# Patient Record
Sex: MALE | Race: WHITE | NOT HISPANIC OR LATINO | Employment: OTHER | URBAN - METROPOLITAN AREA
[De-identification: names, ages, dates, MRNs, and addresses within clinical notes are randomized per-mention and may not be internally consistent; named-entity substitution may affect disease eponyms.]

---

## 2017-02-01 ENCOUNTER — OFFICE VISIT - HEALTHEAST (OUTPATIENT)
Dept: FAMILY MEDICINE | Facility: CLINIC | Age: 67
End: 2017-02-01

## 2017-02-01 DIAGNOSIS — J43.9 COPD (CHRONIC OBSTRUCTIVE PULMONARY DISEASE) WITH EMPHYSEMA (H): ICD-10-CM

## 2017-02-01 DIAGNOSIS — I71.40 AAA (ABDOMINAL AORTIC ANEURYSM) (H): ICD-10-CM

## 2017-02-01 DIAGNOSIS — R09.02 HYPOXEMIA: ICD-10-CM

## 2017-02-01 DIAGNOSIS — N40.0 BPH (BENIGN PROSTATIC HYPERPLASIA): ICD-10-CM

## 2017-02-01 DIAGNOSIS — R68.82 LOW LIBIDO: ICD-10-CM

## 2017-02-01 DIAGNOSIS — E11.9 TYPE 2 DIABETES MELLITUS (H): ICD-10-CM

## 2017-02-01 LAB — HBA1C MFR BLD: 7.2 % (ref 3.5–6)

## 2017-02-06 ENCOUNTER — AMBULATORY - HEALTHEAST (OUTPATIENT)
Dept: FAMILY MEDICINE | Facility: CLINIC | Age: 67
End: 2017-02-06

## 2017-02-08 ENCOUNTER — COMMUNICATION - HEALTHEAST (OUTPATIENT)
Dept: FAMILY MEDICINE | Facility: CLINIC | Age: 67
End: 2017-02-08

## 2017-02-09 ENCOUNTER — COMMUNICATION - HEALTHEAST (OUTPATIENT)
Dept: FAMILY MEDICINE | Facility: CLINIC | Age: 67
End: 2017-02-09

## 2017-02-09 DIAGNOSIS — R79.89 LOW TESTOSTERONE LEVEL IN MALE: ICD-10-CM

## 2017-02-27 ENCOUNTER — AMBULATORY - HEALTHEAST (OUTPATIENT)
Dept: SCHEDULING | Facility: CLINIC | Age: 67
End: 2017-02-27

## 2017-03-02 ENCOUNTER — OFFICE VISIT - HEALTHEAST (OUTPATIENT)
Dept: PULMONOLOGY | Facility: OTHER | Age: 67
End: 2017-03-02

## 2017-03-02 DIAGNOSIS — J41.0 SIMPLE CHRONIC BRONCHITIS (H): ICD-10-CM

## 2017-03-02 DIAGNOSIS — G47.33 OBSTRUCTIVE SLEEP APNEA (ADULT) (PEDIATRIC): ICD-10-CM

## 2017-03-02 DIAGNOSIS — R06.09 DYSPNEA ON EXERTION: ICD-10-CM

## 2017-03-05 ENCOUNTER — COMMUNICATION - HEALTHEAST (OUTPATIENT)
Dept: EDUCATION SERVICES | Facility: CLINIC | Age: 67
End: 2017-03-05

## 2017-03-09 ENCOUNTER — RECORDS - HEALTHEAST (OUTPATIENT)
Dept: VASCULAR ULTRASOUND | Facility: CLINIC | Age: 67
End: 2017-03-09

## 2017-03-09 ENCOUNTER — OFFICE VISIT - HEALTHEAST (OUTPATIENT)
Dept: VASCULAR SURGERY | Facility: CLINIC | Age: 67
End: 2017-03-09

## 2017-03-09 ENCOUNTER — RECORDS - HEALTHEAST (OUTPATIENT)
Dept: ADMINISTRATIVE | Facility: OTHER | Age: 67
End: 2017-03-09

## 2017-03-09 DIAGNOSIS — I10 HTN (HYPERTENSION): ICD-10-CM

## 2017-03-09 DIAGNOSIS — I71.40 ABDOMINAL AORTIC ANEURYSM, WITHOUT RUPTURE: ICD-10-CM

## 2017-03-09 DIAGNOSIS — Z87.891 HISTORY OF SMOKING: ICD-10-CM

## 2017-03-09 DIAGNOSIS — I71.40 ABDOMINAL AORTIC ANEURYSM WITHOUT RUPTURE (H): ICD-10-CM

## 2017-03-09 DIAGNOSIS — E78.5 HYPERLIPIDEMIA: ICD-10-CM

## 2017-03-09 ASSESSMENT — MIFFLIN-ST. JEOR: SCORE: 1900.68

## 2017-03-13 ENCOUNTER — COMMUNICATION - HEALTHEAST (OUTPATIENT)
Dept: FAMILY MEDICINE | Facility: CLINIC | Age: 67
End: 2017-03-13

## 2017-03-20 ENCOUNTER — AMBULATORY - HEALTHEAST (OUTPATIENT)
Dept: SLEEP MEDICINE | Facility: CLINIC | Age: 67
End: 2017-03-20

## 2017-03-20 ENCOUNTER — OFFICE VISIT - HEALTHEAST (OUTPATIENT)
Dept: SLEEP MEDICINE | Facility: CLINIC | Age: 67
End: 2017-03-20

## 2017-03-20 DIAGNOSIS — G47.10 HYPERSOMNIA, UNSPECIFIED: ICD-10-CM

## 2017-03-20 DIAGNOSIS — G47.33 OSA ON CPAP: ICD-10-CM

## 2017-03-20 DIAGNOSIS — G47.8 SLEEP DYSFUNCTION WITH SLEEP STAGE DISTURBANCE: ICD-10-CM

## 2017-03-20 DIAGNOSIS — J44.9 COPD (CHRONIC OBSTRUCTIVE PULMONARY DISEASE) (H): ICD-10-CM

## 2017-03-20 ASSESSMENT — MIFFLIN-ST. JEOR: SCORE: 1916.55

## 2017-03-31 ENCOUNTER — RECORDS - HEALTHEAST (OUTPATIENT)
Dept: ADMINISTRATIVE | Facility: OTHER | Age: 67
End: 2017-03-31

## 2017-04-03 ENCOUNTER — RECORDS - HEALTHEAST (OUTPATIENT)
Dept: ADMINISTRATIVE | Facility: OTHER | Age: 67
End: 2017-04-03

## 2017-04-21 ENCOUNTER — COMMUNICATION - HEALTHEAST (OUTPATIENT)
Dept: FAMILY MEDICINE | Facility: CLINIC | Age: 67
End: 2017-04-21

## 2017-04-23 ENCOUNTER — COMMUNICATION - HEALTHEAST (OUTPATIENT)
Dept: EDUCATION SERVICES | Facility: CLINIC | Age: 67
End: 2017-04-23

## 2017-04-27 ENCOUNTER — COMMUNICATION - HEALTHEAST (OUTPATIENT)
Dept: FAMILY MEDICINE | Facility: CLINIC | Age: 67
End: 2017-04-27

## 2017-04-27 DIAGNOSIS — Z71.6 ENCOUNTER FOR SMOKING CESSATION COUNSELING: ICD-10-CM

## 2017-05-01 ENCOUNTER — OFFICE VISIT - HEALTHEAST (OUTPATIENT)
Dept: FAMILY MEDICINE | Facility: CLINIC | Age: 67
End: 2017-05-01

## 2017-05-01 ENCOUNTER — COMMUNICATION - HEALTHEAST (OUTPATIENT)
Dept: FAMILY MEDICINE | Facility: CLINIC | Age: 67
End: 2017-05-01

## 2017-05-01 DIAGNOSIS — G62.9 PERIPHERAL NEUROPATHY: ICD-10-CM

## 2017-05-01 DIAGNOSIS — G47.33 OBSTRUCTIVE SLEEP APNEA (ADULT) (PEDIATRIC): ICD-10-CM

## 2017-05-01 DIAGNOSIS — J43.9 COPD (CHRONIC OBSTRUCTIVE PULMONARY DISEASE) WITH EMPHYSEMA (H): ICD-10-CM

## 2017-05-01 DIAGNOSIS — I10 ESSENTIAL HYPERTENSION: ICD-10-CM

## 2017-06-01 ENCOUNTER — OFFICE VISIT - HEALTHEAST (OUTPATIENT)
Dept: SLEEP MEDICINE | Facility: CLINIC | Age: 67
End: 2017-06-01

## 2017-06-01 DIAGNOSIS — G47.10 HYPERSOMNIA, UNSPECIFIED: ICD-10-CM

## 2017-06-01 DIAGNOSIS — G47.8 SLEEP DYSFUNCTION WITH SLEEP STAGE DISTURBANCE: ICD-10-CM

## 2017-06-01 DIAGNOSIS — G47.33 OSA ON CPAP: ICD-10-CM

## 2017-06-01 ASSESSMENT — MIFFLIN-ST. JEOR: SCORE: 1912.92

## 2017-06-12 ENCOUNTER — COMMUNICATION - HEALTHEAST (OUTPATIENT)
Dept: FAMILY MEDICINE | Facility: CLINIC | Age: 67
End: 2017-06-12

## 2017-06-12 DIAGNOSIS — I10 ESSENTIAL HYPERTENSION: ICD-10-CM

## 2017-06-12 DIAGNOSIS — J44.9 COPD (CHRONIC OBSTRUCTIVE PULMONARY DISEASE) (H): ICD-10-CM

## 2017-06-12 DIAGNOSIS — R79.89 LOW TESTOSTERONE LEVEL IN MALE: ICD-10-CM

## 2017-06-12 DIAGNOSIS — J43.9 COPD (CHRONIC OBSTRUCTIVE PULMONARY DISEASE) WITH EMPHYSEMA (H): ICD-10-CM

## 2017-06-12 DIAGNOSIS — Z00.00 HEALTHCARE MAINTENANCE: ICD-10-CM

## 2017-06-12 DIAGNOSIS — Z71.6 ENCOUNTER FOR SMOKING CESSATION COUNSELING: ICD-10-CM

## 2017-06-12 DIAGNOSIS — E11.9 TYPE 2 DIABETES MELLITUS (H): ICD-10-CM

## 2017-06-12 DIAGNOSIS — I10 HTN (HYPERTENSION): ICD-10-CM

## 2017-06-12 DIAGNOSIS — E78.5 HYPERLIPEMIA: ICD-10-CM

## 2017-06-14 ENCOUNTER — RECORDS - HEALTHEAST (OUTPATIENT)
Dept: ADMINISTRATIVE | Facility: OTHER | Age: 67
End: 2017-06-14

## 2017-06-20 ENCOUNTER — COMMUNICATION - HEALTHEAST (OUTPATIENT)
Dept: FAMILY MEDICINE | Facility: CLINIC | Age: 67
End: 2017-06-20

## 2017-06-20 DIAGNOSIS — R79.89 LOW TESTOSTERONE LEVEL IN MALE: ICD-10-CM

## 2017-06-21 ENCOUNTER — AMBULATORY - HEALTHEAST (OUTPATIENT)
Dept: FAMILY MEDICINE | Facility: CLINIC | Age: 67
End: 2017-06-21

## 2017-06-21 ENCOUNTER — COMMUNICATION - HEALTHEAST (OUTPATIENT)
Dept: FAMILY MEDICINE | Facility: CLINIC | Age: 67
End: 2017-06-21

## 2017-06-21 DIAGNOSIS — R79.89 LOW TESTOSTERONE LEVEL IN MALE: ICD-10-CM

## 2017-06-21 DIAGNOSIS — R79.89 LOW TESTOSTERONE: ICD-10-CM

## 2017-06-23 ENCOUNTER — RECORDS - HEALTHEAST (OUTPATIENT)
Dept: ADMINISTRATIVE | Facility: OTHER | Age: 67
End: 2017-06-23

## 2017-06-26 ENCOUNTER — COMMUNICATION - HEALTHEAST (OUTPATIENT)
Dept: FAMILY MEDICINE | Facility: CLINIC | Age: 67
End: 2017-06-26

## 2017-06-26 DIAGNOSIS — E55.9 VITAMIN D DEFICIENCY: ICD-10-CM

## 2017-06-29 ENCOUNTER — OFFICE VISIT - HEALTHEAST (OUTPATIENT)
Dept: SLEEP MEDICINE | Facility: CLINIC | Age: 67
End: 2017-06-29

## 2017-06-29 DIAGNOSIS — G47.10 HYPERSOMNIA, UNSPECIFIED: ICD-10-CM

## 2017-06-29 DIAGNOSIS — G47.34 SLEEP RELATED HYPOXIA: ICD-10-CM

## 2017-06-29 DIAGNOSIS — G47.8 SLEEP DYSFUNCTION WITH SLEEP STAGE DISTURBANCE: ICD-10-CM

## 2017-06-29 DIAGNOSIS — G47.33 OSA ON CPAP: ICD-10-CM

## 2017-06-29 ASSESSMENT — MIFFLIN-ST. JEOR: SCORE: 1917

## 2017-06-30 ENCOUNTER — COMMUNICATION - HEALTHEAST (OUTPATIENT)
Dept: FAMILY MEDICINE | Facility: CLINIC | Age: 67
End: 2017-06-30

## 2017-07-03 ENCOUNTER — COMMUNICATION - HEALTHEAST (OUTPATIENT)
Dept: FAMILY MEDICINE | Facility: CLINIC | Age: 67
End: 2017-07-03

## 2017-07-03 DIAGNOSIS — R79.89 LOW TESTOSTERONE: ICD-10-CM

## 2017-07-10 ENCOUNTER — HOSPITAL ENCOUNTER (OUTPATIENT)
Dept: CT IMAGING | Facility: CLINIC | Age: 67
Discharge: HOME | End: 2017-07-10
Attending: SURGERY

## 2017-07-10 DIAGNOSIS — I71.40 ABDOMINAL AORTIC ANEURYSM WITHOUT RUPTURE (H): ICD-10-CM

## 2017-07-24 ENCOUNTER — COMMUNICATION - HEALTHEAST (OUTPATIENT)
Dept: TELEHEALTH | Facility: CLINIC | Age: 67
End: 2017-07-24

## 2017-07-24 ENCOUNTER — OFFICE VISIT - HEALTHEAST (OUTPATIENT)
Dept: VASCULAR SURGERY | Facility: CLINIC | Age: 67
End: 2017-07-24

## 2017-07-24 DIAGNOSIS — I71.40 ABDOMINAL AORTIC ANEURYSM WITHOUT RUPTURE (H): ICD-10-CM

## 2017-07-24 ASSESSMENT — MIFFLIN-ST. JEOR: SCORE: 1912.92

## 2017-09-28 ENCOUNTER — COMMUNICATION - HEALTHEAST (OUTPATIENT)
Dept: FAMILY MEDICINE | Facility: CLINIC | Age: 67
End: 2017-09-28

## 2017-10-12 ENCOUNTER — OFFICE VISIT - HEALTHEAST (OUTPATIENT)
Dept: FAMILY MEDICINE | Facility: CLINIC | Age: 67
End: 2017-10-12

## 2017-10-12 DIAGNOSIS — E11.9 TYPE 2 DIABETES MELLITUS (H): ICD-10-CM

## 2017-10-12 DIAGNOSIS — I71.40 AAA (ABDOMINAL AORTIC ANEURYSM) (H): ICD-10-CM

## 2017-10-12 DIAGNOSIS — Q27.2: ICD-10-CM

## 2017-10-12 DIAGNOSIS — Z71.6 ENCOUNTER FOR SMOKING CESSATION COUNSELING: ICD-10-CM

## 2017-10-12 DIAGNOSIS — E78.5 HYPERLIPEMIA: ICD-10-CM

## 2017-10-12 DIAGNOSIS — G62.9 PERIPHERAL NEUROPATHY: ICD-10-CM

## 2017-10-12 DIAGNOSIS — Z23 NEED FOR IMMUNIZATION AGAINST INFLUENZA: ICD-10-CM

## 2017-10-12 DIAGNOSIS — G47.30 SLEEP APNEA: ICD-10-CM

## 2017-10-12 DIAGNOSIS — J43.9 COPD (CHRONIC OBSTRUCTIVE PULMONARY DISEASE) WITH EMPHYSEMA (H): ICD-10-CM

## 2017-10-12 DIAGNOSIS — R06.09 DYSPNEA ON EXERTION: ICD-10-CM

## 2017-10-12 DIAGNOSIS — J44.9 COPD (CHRONIC OBSTRUCTIVE PULMONARY DISEASE) (H): ICD-10-CM

## 2017-10-12 DIAGNOSIS — R79.89 LOW TESTOSTERONE: ICD-10-CM

## 2017-10-12 DIAGNOSIS — N40.0 BPH (BENIGN PROSTATIC HYPERPLASIA): ICD-10-CM

## 2017-10-12 DIAGNOSIS — Z00.00 HEALTHCARE MAINTENANCE: ICD-10-CM

## 2017-10-12 DIAGNOSIS — I10 ESSENTIAL HYPERTENSION: ICD-10-CM

## 2017-10-12 LAB
CHOLEST SERPL-MCNC: 158 MG/DL
FASTING STATUS PATIENT QL REPORTED: NO
HBA1C MFR BLD: 7.5 % (ref 3.5–6)
HDLC SERPL-MCNC: 31 MG/DL
LDLC SERPL CALC-MCNC: 89 MG/DL
TRIGL SERPL-MCNC: 189 MG/DL

## 2017-10-13 LAB — HCV AB SERPL QL IA: NEGATIVE

## 2017-10-16 ENCOUNTER — COMMUNICATION - HEALTHEAST (OUTPATIENT)
Dept: OTHER | Facility: CLINIC | Age: 67
End: 2017-10-16

## 2017-10-16 RX ORDER — BUDESONIDE AND FORMOTEROL FUMARATE DIHYDRATE 80; 4.5 UG/1; UG/1
2 AEROSOL RESPIRATORY (INHALATION) 2 TIMES DAILY
Qty: 1 INHALER | Refills: 12 | Status: SHIPPED | OUTPATIENT
Start: 2017-10-16

## 2017-10-16 RX ORDER — TESTOSTERONE 40.5 MG/2.5G
GEL TOPICAL
Qty: 75 G | Refills: 5 | Status: SHIPPED | OUTPATIENT
Start: 2017-10-16 | End: 2021-01-01

## 2017-10-16 RX ORDER — TIOTROPIUM BROMIDE 18 UG/1
CAPSULE ORAL; RESPIRATORY (INHALATION)
Qty: 30 CAPSULE | Refills: 5 | Status: SHIPPED | OUTPATIENT
Start: 2017-10-16

## 2017-10-18 ENCOUNTER — COMMUNICATION - HEALTHEAST (OUTPATIENT)
Dept: FAMILY MEDICINE | Facility: CLINIC | Age: 67
End: 2017-10-18

## 2017-10-18 DIAGNOSIS — E11.9 TYPE 2 DIABETES MELLITUS (H): ICD-10-CM

## 2017-10-25 ENCOUNTER — AMBULATORY - HEALTHEAST (OUTPATIENT)
Dept: OTHER | Facility: CLINIC | Age: 67
End: 2017-10-25

## 2017-10-30 ENCOUNTER — HOSPITAL ENCOUNTER (OUTPATIENT)
Dept: CARDIOLOGY | Facility: CLINIC | Age: 67
Discharge: HOME | End: 2017-10-30
Attending: FAMILY MEDICINE

## 2017-10-30 ENCOUNTER — HOSPITAL ENCOUNTER (OUTPATIENT)
Dept: NUCLEAR MEDICINE | Facility: CLINIC | Age: 67
Discharge: HOME | End: 2017-10-30
Attending: FAMILY MEDICINE

## 2017-10-30 DIAGNOSIS — R06.09 DYSPNEA ON EXERTION: ICD-10-CM

## 2017-11-02 ENCOUNTER — HOSPITAL ENCOUNTER (OUTPATIENT)
Dept: NUCLEAR MEDICINE | Facility: CLINIC | Age: 67
Discharge: HOME | End: 2017-11-02
Attending: FAMILY MEDICINE

## 2017-11-02 ENCOUNTER — HOSPITAL ENCOUNTER (OUTPATIENT)
Dept: CARDIOLOGY | Facility: CLINIC | Age: 67
Discharge: HOME | End: 2017-11-02
Attending: FAMILY MEDICINE

## 2017-11-02 DIAGNOSIS — R06.09 OTHER FORMS OF DYSPNEA: ICD-10-CM

## 2017-11-02 DIAGNOSIS — R06.09 DYSPNEA ON EXERTION: ICD-10-CM

## 2017-11-02 LAB
CV STRESS CURRENT BP HE: NORMAL
CV STRESS CURRENT HR HE: 76
CV STRESS CURRENT HR HE: 77
CV STRESS CURRENT HR HE: 77
CV STRESS CURRENT HR HE: 89
CV STRESS CURRENT HR HE: 90
CV STRESS CURRENT HR HE: 91
CV STRESS CURRENT HR HE: 92
CV STRESS CURRENT HR HE: 92
CV STRESS CURRENT HR HE: 95
CV STRESS CURRENT HR HE: 98
CV STRESS DEVIATION TIME HE: NORMAL
CV STRESS ECHO PERCENT HR HE: NORMAL
CV STRESS EXERCISE STAGE HE: NORMAL
CV STRESS FINAL RESTING BP HE: NORMAL
CV STRESS FINAL RESTING HR HE: 92
CV STRESS MAX HR HE: 102
CV STRESS MAX TREADMILL GRADE HE: 0
CV STRESS MAX TREADMILL SPEED HE: 0
CV STRESS PEAK DIA BP HE: NORMAL
CV STRESS PEAK SYS BP HE: NORMAL
CV STRESS PHASE HE: NORMAL
CV STRESS PROTOCOL HE: NORMAL
CV STRESS RESTING PT POSITION HE: NORMAL
CV STRESS ST DEVIATION AMOUNT HE: NORMAL
CV STRESS ST DEVIATION ELEVATION HE: NORMAL
CV STRESS ST EVELATION AMOUNT HE: NORMAL
CV STRESS TEST TYPE HE: NORMAL
CV STRESS TOTAL STAGE TIME MIN 1 HE: NORMAL
NUC STRESS EJECTION FRACTION: 60 %
STRESS ECHO BASELINE BP: NORMAL
STRESS ECHO BASELINE HR: 77
STRESS ECHO CALCULATED PERCENT HR: 67 %
STRESS ECHO LAST STRESS BP: NORMAL
STRESS ECHO LAST STRESS HR: 90

## 2017-11-03 ENCOUNTER — COMMUNICATION - HEALTHEAST (OUTPATIENT)
Dept: FAMILY MEDICINE | Facility: CLINIC | Age: 67
End: 2017-11-03

## 2017-12-08 ENCOUNTER — AMBULATORY - HEALTHEAST (OUTPATIENT)
Dept: OTHER | Facility: CLINIC | Age: 67
End: 2017-12-08

## 2017-12-15 ENCOUNTER — COMMUNICATION - HEALTHEAST (OUTPATIENT)
Dept: FAMILY MEDICINE | Facility: CLINIC | Age: 67
End: 2017-12-15

## 2017-12-15 DIAGNOSIS — E11.9 TYPE 2 DIABETES MELLITUS (H): ICD-10-CM

## 2018-01-02 ENCOUNTER — RECORDS - HEALTHEAST (OUTPATIENT)
Dept: ADMINISTRATIVE | Facility: OTHER | Age: 68
End: 2018-01-02

## 2018-01-09 ENCOUNTER — AMBULATORY - HEALTHEAST (OUTPATIENT)
Dept: EDUCATION SERVICES | Facility: CLINIC | Age: 68
End: 2018-01-09

## 2018-02-05 ENCOUNTER — RECORDS - HEALTHEAST (OUTPATIENT)
Dept: VASCULAR ULTRASOUND | Facility: CLINIC | Age: 68
End: 2018-02-05

## 2018-02-05 ENCOUNTER — OFFICE VISIT - HEALTHEAST (OUTPATIENT)
Dept: VASCULAR SURGERY | Facility: CLINIC | Age: 68
End: 2018-02-05

## 2018-02-05 DIAGNOSIS — I71.40 ABDOMINAL AORTIC ANEURYSM (H): ICD-10-CM

## 2018-02-05 DIAGNOSIS — I71.40 ABDOMINAL AORTIC ANEURYSM, WITHOUT RUPTURE: ICD-10-CM

## 2018-03-21 ENCOUNTER — COMMUNICATION - HEALTHEAST (OUTPATIENT)
Dept: FAMILY MEDICINE | Facility: CLINIC | Age: 68
End: 2018-03-21

## 2018-03-21 DIAGNOSIS — J43.9 COPD (CHRONIC OBSTRUCTIVE PULMONARY DISEASE) WITH EMPHYSEMA (H): ICD-10-CM

## 2018-03-21 RX ORDER — ALBUTEROL SULFATE 90 UG/1
2 AEROSOL, METERED RESPIRATORY (INHALATION) EVERY 6 HOURS PRN
Qty: 1 EACH | Refills: 6 | Status: SHIPPED | OUTPATIENT
Start: 2018-03-21

## 2018-03-23 ENCOUNTER — OFFICE VISIT - HEALTHEAST (OUTPATIENT)
Dept: FAMILY MEDICINE | Facility: CLINIC | Age: 68
End: 2018-03-23

## 2018-03-23 ENCOUNTER — RECORDS - HEALTHEAST (OUTPATIENT)
Dept: GENERAL RADIOLOGY | Facility: CLINIC | Age: 68
End: 2018-03-23

## 2018-03-23 ENCOUNTER — COMMUNICATION - HEALTHEAST (OUTPATIENT)
Dept: FAMILY MEDICINE | Facility: CLINIC | Age: 68
End: 2018-03-23

## 2018-03-23 DIAGNOSIS — M54.31 RIGHT SIDED SCIATICA: ICD-10-CM

## 2018-03-23 DIAGNOSIS — M54.31 SCIATICA, RIGHT SIDE: ICD-10-CM

## 2018-03-23 DIAGNOSIS — E11.9 TYPE 2 DIABETES MELLITUS (H): ICD-10-CM

## 2018-03-23 DIAGNOSIS — I10 ESSENTIAL HYPERTENSION: ICD-10-CM

## 2018-03-23 DIAGNOSIS — E55.9 VITAMIN D DEFICIENCY: ICD-10-CM

## 2018-03-23 LAB
ALBUMIN SERPL-MCNC: 3.8 G/DL (ref 3.5–5)
ALP SERPL-CCNC: 68 U/L (ref 45–120)
ALT SERPL W P-5'-P-CCNC: 59 U/L (ref 0–45)
ANION GAP SERPL CALCULATED.3IONS-SCNC: 11 MMOL/L (ref 5–18)
AST SERPL W P-5'-P-CCNC: 40 U/L (ref 0–40)
BILIRUB SERPL-MCNC: 0.6 MG/DL (ref 0–1)
BUN SERPL-MCNC: 8 MG/DL (ref 8–22)
CALCIUM SERPL-MCNC: 9.9 MG/DL (ref 8.5–10.5)
CHLORIDE BLD-SCNC: 103 MMOL/L (ref 98–107)
CO2 SERPL-SCNC: 29 MMOL/L (ref 22–31)
CREAT SERPL-MCNC: 0.79 MG/DL (ref 0.7–1.3)
GFR SERPL CREATININE-BSD FRML MDRD: >60 ML/MIN/1.73M2
GLUCOSE BLD-MCNC: 64 MG/DL (ref 70–125)
HBA1C MFR BLD: 6.2 % (ref 3.5–6)
POTASSIUM BLD-SCNC: 4.8 MMOL/L (ref 3.5–5)
PROT SERPL-MCNC: 7.2 G/DL (ref 6–8)
SODIUM SERPL-SCNC: 143 MMOL/L (ref 136–145)

## 2018-03-26 LAB — 25(OH)D3 SERPL-MCNC: 64.6 NG/ML (ref 30–80)

## 2018-03-27 ENCOUNTER — COMMUNICATION - HEALTHEAST (OUTPATIENT)
Dept: FAMILY MEDICINE | Facility: CLINIC | Age: 68
End: 2018-03-27

## 2018-03-28 ENCOUNTER — COMMUNICATION - HEALTHEAST (OUTPATIENT)
Dept: FAMILY MEDICINE | Facility: CLINIC | Age: 68
End: 2018-03-28

## 2018-03-28 DIAGNOSIS — M54.31 RIGHT SIDED SCIATICA: ICD-10-CM

## 2018-03-29 ENCOUNTER — COMMUNICATION - HEALTHEAST (OUTPATIENT)
Dept: FAMILY MEDICINE | Facility: CLINIC | Age: 68
End: 2018-03-29

## 2018-03-29 DIAGNOSIS — M54.9 BACK PAIN: ICD-10-CM

## 2018-05-01 ENCOUNTER — HOSPITAL ENCOUNTER (OUTPATIENT)
Dept: PHYSICAL MEDICINE AND REHAB | Facility: CLINIC | Age: 68
Discharge: HOME | End: 2018-05-01
Attending: FAMILY MEDICINE

## 2018-05-01 DIAGNOSIS — M54.16 LUMBAR RADICULITIS: ICD-10-CM

## 2018-05-01 DIAGNOSIS — M79.18 MYOFASCIAL PAIN: ICD-10-CM

## 2018-05-01 DIAGNOSIS — M54.50 LUMBALGIA: ICD-10-CM

## 2018-05-01 ASSESSMENT — MIFFLIN-ST. JEOR: SCORE: 1858.04

## 2018-05-09 ENCOUNTER — OFFICE VISIT - HEALTHEAST (OUTPATIENT)
Dept: PHYSICAL THERAPY | Facility: REHABILITATION | Age: 68
End: 2018-05-09

## 2018-05-09 DIAGNOSIS — M62.81 GENERALIZED MUSCLE WEAKNESS: ICD-10-CM

## 2018-05-09 DIAGNOSIS — M54.41 ACUTE RIGHT-SIDED LOW BACK PAIN WITH RIGHT-SIDED SCIATICA: ICD-10-CM

## 2018-05-11 ENCOUNTER — OFFICE VISIT - HEALTHEAST (OUTPATIENT)
Dept: PHYSICAL THERAPY | Facility: REHABILITATION | Age: 68
End: 2018-05-11

## 2018-05-11 DIAGNOSIS — M54.41 ACUTE RIGHT-SIDED LOW BACK PAIN WITH RIGHT-SIDED SCIATICA: ICD-10-CM

## 2018-05-11 DIAGNOSIS — M62.81 GENERALIZED MUSCLE WEAKNESS: ICD-10-CM

## 2018-05-18 ENCOUNTER — COMMUNICATION - HEALTHEAST (OUTPATIENT)
Dept: FAMILY MEDICINE | Facility: CLINIC | Age: 68
End: 2018-05-18

## 2018-05-18 DIAGNOSIS — S33.6XXA SPRAIN OF SACROILIAC REGION: ICD-10-CM

## 2018-05-18 DIAGNOSIS — M54.2 CERVICALGIA: ICD-10-CM

## 2018-05-18 DIAGNOSIS — M54.9 BACK PAIN: ICD-10-CM

## 2018-05-21 ENCOUNTER — COMMUNICATION - HEALTHEAST (OUTPATIENT)
Dept: FAMILY MEDICINE | Facility: CLINIC | Age: 68
End: 2018-05-21

## 2018-05-21 ENCOUNTER — OFFICE VISIT - HEALTHEAST (OUTPATIENT)
Dept: PHYSICAL THERAPY | Facility: REHABILITATION | Age: 68
End: 2018-05-21

## 2018-05-21 DIAGNOSIS — M54.41 ACUTE RIGHT-SIDED LOW BACK PAIN WITH RIGHT-SIDED SCIATICA: ICD-10-CM

## 2018-05-21 DIAGNOSIS — M62.81 GENERALIZED MUSCLE WEAKNESS: ICD-10-CM

## 2018-05-30 ENCOUNTER — OFFICE VISIT - HEALTHEAST (OUTPATIENT)
Dept: PHYSICAL THERAPY | Facility: REHABILITATION | Age: 68
End: 2018-05-30

## 2018-05-30 DIAGNOSIS — M62.81 GENERALIZED MUSCLE WEAKNESS: ICD-10-CM

## 2018-05-30 DIAGNOSIS — M54.41 ACUTE RIGHT-SIDED LOW BACK PAIN WITH RIGHT-SIDED SCIATICA: ICD-10-CM

## 2018-06-08 ENCOUNTER — OFFICE VISIT - HEALTHEAST (OUTPATIENT)
Dept: PHYSICAL THERAPY | Facility: REHABILITATION | Age: 68
End: 2018-06-08

## 2018-06-08 DIAGNOSIS — M54.41 ACUTE RIGHT-SIDED LOW BACK PAIN WITH RIGHT-SIDED SCIATICA: ICD-10-CM

## 2018-06-08 DIAGNOSIS — M62.81 GENERALIZED MUSCLE WEAKNESS: ICD-10-CM

## 2018-08-08 ENCOUNTER — OFFICE VISIT - HEALTHEAST (OUTPATIENT)
Dept: VASCULAR SURGERY | Facility: CLINIC | Age: 68
End: 2018-08-08

## 2018-08-08 ENCOUNTER — RECORDS - HEALTHEAST (OUTPATIENT)
Dept: VASCULAR ULTRASOUND | Facility: CLINIC | Age: 68
End: 2018-08-08

## 2018-08-08 DIAGNOSIS — I71.40 ABDOMINAL AORTIC ANEURYSM WITHOUT RUPTURE (H): ICD-10-CM

## 2018-08-08 DIAGNOSIS — I71.40 ABDOMINAL AORTIC ANEURYSM, WITHOUT RUPTURE: ICD-10-CM

## 2018-08-08 ASSESSMENT — MIFFLIN-ST. JEOR: SCORE: 1835.36

## 2018-09-04 ENCOUNTER — COMMUNICATION - HEALTHEAST (OUTPATIENT)
Dept: FAMILY MEDICINE | Facility: CLINIC | Age: 68
End: 2018-09-04

## 2018-09-04 DIAGNOSIS — E11.9 TYPE 2 DIABETES MELLITUS (H): ICD-10-CM

## 2018-09-27 ENCOUNTER — RECORDS - HEALTHEAST (OUTPATIENT)
Dept: ADMINISTRATIVE | Facility: OTHER | Age: 68
End: 2018-09-27

## 2018-10-01 ENCOUNTER — COMMUNICATION - HEALTHEAST (OUTPATIENT)
Dept: FAMILY MEDICINE | Facility: CLINIC | Age: 68
End: 2018-10-01

## 2018-10-08 ENCOUNTER — COMMUNICATION - HEALTHEAST (OUTPATIENT)
Dept: FAMILY MEDICINE | Facility: CLINIC | Age: 68
End: 2018-10-08

## 2018-10-08 DIAGNOSIS — I10 ESSENTIAL HYPERTENSION: ICD-10-CM

## 2018-10-22 ENCOUNTER — OFFICE VISIT - HEALTHEAST (OUTPATIENT)
Dept: FAMILY MEDICINE | Facility: CLINIC | Age: 68
End: 2018-10-22

## 2018-10-22 DIAGNOSIS — R61 DIAPHORESIS: ICD-10-CM

## 2018-10-22 DIAGNOSIS — N40.0 BPH (BENIGN PROSTATIC HYPERPLASIA): ICD-10-CM

## 2018-10-22 DIAGNOSIS — G62.9 PERIPHERAL NEUROPATHY: ICD-10-CM

## 2018-10-22 DIAGNOSIS — R05.9 COUGH: ICD-10-CM

## 2018-10-22 DIAGNOSIS — E11.8 TYPE 2 DIABETES MELLITUS WITH COMPLICATION, WITHOUT LONG-TERM CURRENT USE OF INSULIN (H): ICD-10-CM

## 2018-10-22 DIAGNOSIS — G47.33 OSA (OBSTRUCTIVE SLEEP APNEA): ICD-10-CM

## 2018-10-22 DIAGNOSIS — I71.40 ANEURYSM OF ABDOMINAL VESSEL (H): ICD-10-CM

## 2018-10-22 DIAGNOSIS — E78.2 MIXED HYPERLIPIDEMIA: ICD-10-CM

## 2018-10-22 DIAGNOSIS — R79.89 LOW TESTOSTERONE IN MALE: ICD-10-CM

## 2018-10-22 DIAGNOSIS — E55.9 VITAMIN D DEFICIENCY: ICD-10-CM

## 2018-10-22 DIAGNOSIS — I10 ESSENTIAL HYPERTENSION: ICD-10-CM

## 2018-10-22 DIAGNOSIS — J44.1 COPD EXACERBATION (H): ICD-10-CM

## 2018-10-22 LAB
ALBUMIN SERPL-MCNC: 3.7 G/DL (ref 3.5–5)
ALP SERPL-CCNC: 75 U/L (ref 45–120)
ALT SERPL W P-5'-P-CCNC: 36 U/L (ref 0–45)
ANION GAP SERPL CALCULATED.3IONS-SCNC: 13 MMOL/L (ref 5–18)
AST SERPL W P-5'-P-CCNC: 24 U/L (ref 0–40)
BILIRUB SERPL-MCNC: 0.5 MG/DL (ref 0–1)
BUN SERPL-MCNC: 7 MG/DL (ref 8–22)
CALCIUM SERPL-MCNC: 9.6 MG/DL (ref 8.5–10.5)
CHLORIDE BLD-SCNC: 101 MMOL/L (ref 98–107)
CHOLEST SERPL-MCNC: 137 MG/DL
CO2 SERPL-SCNC: 26 MMOL/L (ref 22–31)
CREAT SERPL-MCNC: 0.97 MG/DL (ref 0.7–1.3)
CREAT UR-MCNC: 59.7 MG/DL
ERYTHROCYTE [DISTWIDTH] IN BLOOD BY AUTOMATED COUNT: 11.8 % (ref 11–14.5)
FASTING STATUS PATIENT QL REPORTED: NO
GFR SERPL CREATININE-BSD FRML MDRD: >60 ML/MIN/1.73M2
GLUCOSE BLD-MCNC: 115 MG/DL (ref 70–125)
HBA1C MFR BLD: 6.2 % (ref 3.5–6)
HCT VFR BLD AUTO: 41.4 % (ref 40–54)
HDLC SERPL-MCNC: 33 MG/DL
HGB BLD-MCNC: 14 G/DL (ref 14–18)
LDLC SERPL CALC-MCNC: 70 MG/DL
MCH RBC QN AUTO: 31.9 PG (ref 27–34)
MCHC RBC AUTO-ENTMCNC: 33.8 G/DL (ref 32–36)
MCV RBC AUTO: 95 FL (ref 80–100)
MICROALBUMIN UR-MCNC: <0.5 MG/DL (ref 0–1.99)
MICROALBUMIN/CREAT UR: NORMAL MG/G
PLATELET # BLD AUTO: 194 THOU/UL (ref 140–440)
PMV BLD AUTO: 7.3 FL (ref 7–10)
POTASSIUM BLD-SCNC: 4.5 MMOL/L (ref 3.5–5)
PROT SERPL-MCNC: 7 G/DL (ref 6–8)
RBC # BLD AUTO: 4.38 MILL/UL (ref 4.4–6.2)
SODIUM SERPL-SCNC: 140 MMOL/L (ref 136–145)
TRIGL SERPL-MCNC: 169 MG/DL
TSH SERPL DL<=0.005 MIU/L-ACNC: 1.32 UIU/ML (ref 0.3–5)
VIT B12 SERPL-MCNC: 330 PG/ML (ref 213–816)
WBC: 8.5 THOU/UL (ref 4–11)

## 2018-10-23 ENCOUNTER — COMMUNICATION - HEALTHEAST (OUTPATIENT)
Dept: FAMILY MEDICINE | Facility: CLINIC | Age: 68
End: 2018-10-23

## 2018-10-23 LAB — 25(OH)D3 SERPL-MCNC: 45.6 NG/ML (ref 30–80)

## 2018-10-25 ENCOUNTER — COMMUNICATION - HEALTHEAST (OUTPATIENT)
Dept: FAMILY MEDICINE | Facility: CLINIC | Age: 68
End: 2018-10-25

## 2018-11-05 ENCOUNTER — COMMUNICATION - HEALTHEAST (OUTPATIENT)
Dept: FAMILY MEDICINE | Facility: CLINIC | Age: 68
End: 2018-11-05

## 2018-11-17 ENCOUNTER — COMMUNICATION - HEALTHEAST (OUTPATIENT)
Dept: SCHEDULING | Facility: CLINIC | Age: 68
End: 2018-11-17

## 2018-11-20 ENCOUNTER — RECORDS - HEALTHEAST (OUTPATIENT)
Dept: ADMINISTRATIVE | Facility: OTHER | Age: 68
End: 2018-11-20

## 2019-01-24 ENCOUNTER — COMMUNICATION - HEALTHEAST (OUTPATIENT)
Dept: FAMILY MEDICINE | Facility: CLINIC | Age: 69
End: 2019-01-24

## 2019-02-13 ENCOUNTER — OFFICE VISIT - HEALTHEAST (OUTPATIENT)
Dept: FAMILY MEDICINE | Facility: CLINIC | Age: 69
End: 2019-02-13

## 2019-02-13 ENCOUNTER — OFFICE VISIT - HEALTHEAST (OUTPATIENT)
Dept: VASCULAR SURGERY | Facility: CLINIC | Age: 69
End: 2019-02-13

## 2019-02-13 ENCOUNTER — COMMUNICATION - HEALTHEAST (OUTPATIENT)
Dept: SCHEDULING | Facility: CLINIC | Age: 69
End: 2019-02-13

## 2019-02-13 DIAGNOSIS — R05.9 COUGH: ICD-10-CM

## 2019-02-13 DIAGNOSIS — E11.9 TYPE 2 DIABETES MELLITUS (H): ICD-10-CM

## 2019-02-13 DIAGNOSIS — J96.01 ACUTE RESPIRATORY FAILURE WITH HYPOXIA (H): ICD-10-CM

## 2019-02-13 DIAGNOSIS — I71.40 ABDOMINAL AORTIC ANEURYSM WITHOUT RUPTURE (H): ICD-10-CM

## 2019-02-13 DIAGNOSIS — Z01.818 PRE-OP EVALUATION: ICD-10-CM

## 2019-02-13 DIAGNOSIS — I95.9 HYPOTENSION, UNSPECIFIED HYPOTENSION TYPE: ICD-10-CM

## 2019-02-13 LAB
ATRIAL RATE - MUSE: 83 BPM
DIASTOLIC BLOOD PRESSURE - MUSE: NORMAL MMHG
INTERPRETATION ECG - MUSE: NORMAL
P AXIS - MUSE: 7 DEGREES
PR INTERVAL - MUSE: 176 MS
QRS DURATION - MUSE: 98 MS
QT - MUSE: 372 MS
QTC - MUSE: 437 MS
R AXIS - MUSE: 250 DEGREES
SYSTOLIC BLOOD PRESSURE - MUSE: NORMAL MMHG
T AXIS - MUSE: 52 DEGREES
VENTRICULAR RATE- MUSE: 83 BPM

## 2019-02-14 ENCOUNTER — COMMUNICATION - HEALTHEAST (OUTPATIENT)
Dept: FAMILY MEDICINE | Facility: CLINIC | Age: 69
End: 2019-02-14

## 2019-02-14 ENCOUNTER — AMBULATORY - HEALTHEAST (OUTPATIENT)
Dept: LAB | Facility: CLINIC | Age: 69
End: 2019-02-14

## 2019-02-14 LAB
ALBUMIN SERPL-MCNC: 3.6 G/DL (ref 3.5–5)
ALP SERPL-CCNC: 61 U/L (ref 45–120)
ALT SERPL W P-5'-P-CCNC: 26 U/L (ref 0–45)
ANION GAP SERPL CALCULATED.3IONS-SCNC: 11 MMOL/L (ref 5–18)
AST SERPL W P-5'-P-CCNC: 22 U/L (ref 0–40)
BILIRUB SERPL-MCNC: 1 MG/DL (ref 0–1)
BUN SERPL-MCNC: 10 MG/DL (ref 8–22)
CALCIUM SERPL-MCNC: 8.9 MG/DL (ref 8.5–10.5)
CHLORIDE BLD-SCNC: 102 MMOL/L (ref 98–107)
CO2 SERPL-SCNC: 25 MMOL/L (ref 22–31)
CREAT SERPL-MCNC: 0.94 MG/DL (ref 0.7–1.3)
ERYTHROCYTE [DISTWIDTH] IN BLOOD BY AUTOMATED COUNT: 10.6 % (ref 11–14.5)
GFR SERPL CREATININE-BSD FRML MDRD: >60 ML/MIN/1.73M2
GLUCOSE BLD-MCNC: 193 MG/DL (ref 70–125)
HBA1C MFR BLD: 6 % (ref 3.5–6)
HCT VFR BLD AUTO: 47.5 % (ref 40–54)
HGB BLD-MCNC: 15.4 G/DL (ref 14–18)
MCH RBC QN AUTO: 32.2 PG (ref 27–34)
MCHC RBC AUTO-ENTMCNC: 32.5 G/DL (ref 32–36)
MCV RBC AUTO: 99 FL (ref 80–100)
PLATELET # BLD AUTO: 153 THOU/UL (ref 140–440)
PMV BLD AUTO: 7.6 FL (ref 7–10)
POTASSIUM BLD-SCNC: 4.2 MMOL/L (ref 3.5–5)
PROT SERPL-MCNC: 6.6 G/DL (ref 6–8)
RBC # BLD AUTO: 4.79 MILL/UL (ref 4.4–6.2)
SODIUM SERPL-SCNC: 138 MMOL/L (ref 136–145)
WBC: 6 THOU/UL (ref 4–11)

## 2019-02-15 ENCOUNTER — COMMUNICATION - HEALTHEAST (OUTPATIENT)
Dept: FAMILY MEDICINE | Facility: CLINIC | Age: 69
End: 2019-02-15

## 2019-02-15 ENCOUNTER — AMBULATORY - HEALTHEAST (OUTPATIENT)
Dept: FAMILY MEDICINE | Facility: CLINIC | Age: 69
End: 2019-02-15

## 2019-02-15 DIAGNOSIS — J44.9 CHRONIC OBSTRUCTIVE PULMONARY DISEASE, UNSPECIFIED COPD TYPE (H): ICD-10-CM

## 2019-02-15 RX ORDER — ALBUTEROL SULFATE 0.83 MG/ML
2.5 SOLUTION RESPIRATORY (INHALATION) EVERY 6 HOURS PRN
Qty: 25 VIAL | Refills: 2 | Status: SHIPPED | OUTPATIENT
Start: 2019-02-15

## 2019-02-19 ENCOUNTER — COMMUNICATION - HEALTHEAST (OUTPATIENT)
Dept: FAMILY MEDICINE | Facility: CLINIC | Age: 69
End: 2019-02-19

## 2019-02-19 DIAGNOSIS — E78.5 HYPERLIPEMIA: ICD-10-CM

## 2019-02-22 ENCOUNTER — COMMUNICATION - HEALTHEAST (OUTPATIENT)
Dept: FAMILY MEDICINE | Facility: CLINIC | Age: 69
End: 2019-02-22

## 2019-02-23 ENCOUNTER — COMMUNICATION - HEALTHEAST (OUTPATIENT)
Dept: SCHEDULING | Facility: CLINIC | Age: 69
End: 2019-02-23

## 2019-02-23 DIAGNOSIS — M54.9 BACK PAIN: ICD-10-CM

## 2019-02-26 ENCOUNTER — COMMUNICATION - HEALTHEAST (OUTPATIENT)
Dept: FAMILY MEDICINE | Facility: CLINIC | Age: 69
End: 2019-02-26

## 2019-03-06 ENCOUNTER — HOSPITAL ENCOUNTER (OUTPATIENT)
Dept: RESPIRATORY THERAPY | Facility: HOSPITAL | Age: 69
Discharge: HOME | End: 2019-03-06
Attending: SURGERY

## 2019-03-06 DIAGNOSIS — Z01.818 PRE-OP EVALUATION: ICD-10-CM

## 2019-03-06 DIAGNOSIS — I71.40 ABDOMINAL AORTIC ANEURYSM WITHOUT RUPTURE (H): ICD-10-CM

## 2019-03-07 ENCOUNTER — COMMUNICATION - HEALTHEAST (OUTPATIENT)
Dept: VASCULAR SURGERY | Facility: CLINIC | Age: 69
End: 2019-03-07

## 2019-03-07 ENCOUNTER — AMBULATORY - HEALTHEAST (OUTPATIENT)
Dept: VASCULAR SURGERY | Facility: CLINIC | Age: 69
End: 2019-03-07

## 2019-03-07 DIAGNOSIS — I71.40 ANEURYSM OF ABDOMINAL VESSEL (H): ICD-10-CM

## 2019-03-07 DIAGNOSIS — R94.31 ABNORMAL ECG: ICD-10-CM

## 2019-03-07 DIAGNOSIS — Z01.818 PRE-OP EVALUATION: ICD-10-CM

## 2019-03-11 ENCOUNTER — COMMUNICATION - HEALTHEAST (OUTPATIENT)
Dept: VASCULAR SURGERY | Facility: CLINIC | Age: 69
End: 2019-03-11

## 2019-03-12 ENCOUNTER — OFFICE VISIT - HEALTHEAST (OUTPATIENT)
Dept: PULMONOLOGY | Facility: OTHER | Age: 69
End: 2019-03-12

## 2019-03-12 DIAGNOSIS — J44.9 CHRONIC OBSTRUCTIVE PULMONARY DISEASE, UNSPECIFIED COPD TYPE (H): ICD-10-CM

## 2019-03-12 ASSESSMENT — MIFFLIN-ST. JEOR: SCORE: 1828.16

## 2019-03-14 ENCOUNTER — OFFICE VISIT - HEALTHEAST (OUTPATIENT)
Dept: ALLERGY | Facility: CLINIC | Age: 69
End: 2019-03-14

## 2019-03-14 DIAGNOSIS — R09.81 NASAL CONGESTION: ICD-10-CM

## 2019-03-14 DIAGNOSIS — J45.40 MODERATE PERSISTENT ASTHMA, UNCOMPLICATED: ICD-10-CM

## 2019-03-14 ASSESSMENT — MIFFLIN-ST. JEOR: SCORE: 1828.16

## 2019-03-15 LAB
A ALTERNATA IGE QN: <0.35 KU/L
A FUMIGATUS IGE QN: <0.35 KU/L
C HERBARUM IGE QN: <0.35 KU/L
CAT DANDER IGG QN: <0.35 KU/L
COCKSFOOT IGE QN: <0.35 KU/L
COMMON RAGWEED IGE QN: <0.35 KU/L
COTTONWOOD IGE QN: <0.35 KU/L
D FARINAE IGE QN: <0.35 KU/L
D PTERONYSS IGE QN: <0.35 KU/L
DOG DANDER+EPITH IGE QN: <0.35 KU/L
KENT BLUE GRASS IGE QN: <0.35 KU/L
MAPLE IGE QN: <0.35 KU/L
ROACH IGE QN: <0.35 KU/L
SILVER BIRCH IGE QN: <0.35 KU/L
TIMOTHY IGE QN: <0.35 KU/L
TOTAL IGE - HISTORICAL: 55.6 KU/L (ref 0–100)
WHITE ASH IGE QN: <0.35 KU/L
WHITE ELM IGE QN: <0.35 KU/L
WHITE OAK IGE QN: <0.35 KU/L

## 2019-03-18 ENCOUNTER — HOSPITAL ENCOUNTER (OUTPATIENT)
Dept: NUCLEAR MEDICINE | Facility: HOSPITAL | Age: 69
Discharge: HOME | End: 2019-03-18
Attending: SURGERY

## 2019-03-18 ENCOUNTER — HOSPITAL ENCOUNTER (OUTPATIENT)
Dept: CARDIOLOGY | Facility: HOSPITAL | Age: 69
Discharge: HOME | End: 2019-03-18
Attending: SURGERY

## 2019-03-18 DIAGNOSIS — Z01.818 PRE-OP EVALUATION: ICD-10-CM

## 2019-03-18 DIAGNOSIS — I71.40 ANEURYSM OF ABDOMINAL VESSEL (H): ICD-10-CM

## 2019-03-18 DIAGNOSIS — R94.31 ABNORMAL ECG: ICD-10-CM

## 2019-03-18 LAB
CV STRESS CURRENT BP HE: NORMAL
CV STRESS CURRENT HR HE: 76
CV STRESS CURRENT HR HE: 77
CV STRESS CURRENT HR HE: 81
CV STRESS CURRENT HR HE: 81
CV STRESS CURRENT HR HE: 86
CV STRESS CURRENT HR HE: 87
CV STRESS CURRENT HR HE: 87
CV STRESS CURRENT HR HE: 88
CV STRESS CURRENT HR HE: 88
CV STRESS CURRENT HR HE: 89
CV STRESS CURRENT HR HE: 98
CV STRESS CURRENT HR HE: 98
CV STRESS CURRENT HR HE: 99
CV STRESS CURRENT HR HE: 99
CV STRESS DEVIATION TIME HE: NORMAL
CV STRESS ECHO PERCENT HR HE: NORMAL
CV STRESS EXERCISE STAGE HE: NORMAL
CV STRESS FINAL RESTING BP HE: NORMAL
CV STRESS FINAL RESTING HR HE: 88
CV STRESS MAX HR HE: 99
CV STRESS MAX TREADMILL GRADE HE: 0
CV STRESS MAX TREADMILL SPEED HE: 0
CV STRESS PEAK DIA BP HE: NORMAL
CV STRESS PEAK SYS BP HE: NORMAL
CV STRESS PHASE HE: NORMAL
CV STRESS PROTOCOL HE: NORMAL
CV STRESS RESTING PT POSITION HE: NORMAL
CV STRESS ST DEVIATION AMOUNT HE: NORMAL
CV STRESS ST DEVIATION ELEVATION HE: NORMAL
CV STRESS ST EVELATION AMOUNT HE: NORMAL
CV STRESS TEST TYPE HE: NORMAL
CV STRESS TOTAL STAGE TIME MIN 1 HE: NORMAL
NUC STRESS EJECTION FRACTION: 65 %
STRESS ECHO BASELINE BP: NORMAL
STRESS ECHO BASELINE HR: 83
STRESS ECHO CALCULATED PERCENT HR: 66 %
STRESS ECHO LAST STRESS BP: NORMAL
STRESS ECHO LAST STRESS HR: 89

## 2019-03-18 ASSESSMENT — MIFFLIN-ST. JEOR: SCORE: 1826.74

## 2019-03-19 ENCOUNTER — COMMUNICATION - HEALTHEAST (OUTPATIENT)
Dept: ALLERGY | Facility: CLINIC | Age: 69
End: 2019-03-19

## 2019-03-21 ENCOUNTER — AMBULATORY - HEALTHEAST (OUTPATIENT)
Dept: VASCULAR SURGERY | Facility: CLINIC | Age: 69
End: 2019-03-21

## 2019-03-21 ENCOUNTER — OFFICE VISIT - HEALTHEAST (OUTPATIENT)
Dept: VASCULAR SURGERY | Facility: CLINIC | Age: 69
End: 2019-03-21

## 2019-03-21 DIAGNOSIS — I71.40 ABDOMINAL AORTIC ANEURYSM WITHOUT RUPTURE (H): ICD-10-CM

## 2019-03-21 ASSESSMENT — MIFFLIN-ST. JEOR: SCORE: 1826.74

## 2019-03-22 ENCOUNTER — COMMUNICATION - HEALTHEAST (OUTPATIENT)
Dept: VASCULAR SURGERY | Facility: CLINIC | Age: 69
End: 2019-03-22

## 2019-03-22 ENCOUNTER — AMBULATORY - HEALTHEAST (OUTPATIENT)
Dept: VASCULAR SURGERY | Facility: CLINIC | Age: 69
End: 2019-03-22

## 2019-03-22 DIAGNOSIS — I71.40 ABDOMINAL AORTIC ANEURYSM WITHOUT RUPTURE (H): ICD-10-CM

## 2019-03-24 ENCOUNTER — COMMUNICATION - HEALTHEAST (OUTPATIENT)
Dept: FAMILY MEDICINE | Facility: CLINIC | Age: 69
End: 2019-03-24

## 2019-03-24 ENCOUNTER — COMMUNICATION - HEALTHEAST (OUTPATIENT)
Dept: SCHEDULING | Facility: CLINIC | Age: 69
End: 2019-03-24

## 2019-03-24 DIAGNOSIS — Z71.6 ENCOUNTER FOR SMOKING CESSATION COUNSELING: ICD-10-CM

## 2019-03-24 DIAGNOSIS — E11.9 TYPE 2 DIABETES MELLITUS (H): ICD-10-CM

## 2019-04-01 ENCOUNTER — OFFICE VISIT - HEALTHEAST (OUTPATIENT)
Dept: FAMILY MEDICINE | Facility: CLINIC | Age: 69
End: 2019-04-01

## 2019-04-01 DIAGNOSIS — I10 ESSENTIAL HYPERTENSION, BENIGN: ICD-10-CM

## 2019-04-01 DIAGNOSIS — M54.9 BACK PAIN: ICD-10-CM

## 2019-04-01 DIAGNOSIS — I71.40 AAA (ABDOMINAL AORTIC ANEURYSM) WITHOUT RUPTURE (H): ICD-10-CM

## 2019-04-01 DIAGNOSIS — Z01.818 PRE-OPERATIVE EXAMINATION: ICD-10-CM

## 2019-04-01 DIAGNOSIS — E11.9 TYPE 2 DIABETES MELLITUS (H): ICD-10-CM

## 2019-04-01 DIAGNOSIS — R94.39 ABNORMAL STRESS TEST: ICD-10-CM

## 2019-04-01 DIAGNOSIS — G47.33 OBSTRUCTIVE SLEEP APNEA (ADULT) (PEDIATRIC): ICD-10-CM

## 2019-04-01 DIAGNOSIS — J44.9 CHRONIC OBSTRUCTIVE PULMONARY DISEASE, UNSPECIFIED COPD TYPE (H): ICD-10-CM

## 2019-04-01 DIAGNOSIS — E11.42 DIABETIC POLYNEUROPATHY ASSOCIATED WITH TYPE 2 DIABETES MELLITUS (H): ICD-10-CM

## 2019-04-01 DIAGNOSIS — E78.2 MIXED HYPERLIPIDEMIA: ICD-10-CM

## 2019-04-01 LAB
ALBUMIN SERPL-MCNC: 4 G/DL (ref 3.5–5)
ALP SERPL-CCNC: 68 U/L (ref 45–120)
ALT SERPL W P-5'-P-CCNC: 23 U/L (ref 0–45)
ANION GAP SERPL CALCULATED.3IONS-SCNC: 9 MMOL/L (ref 5–18)
AST SERPL W P-5'-P-CCNC: 19 U/L (ref 0–40)
BILIRUB SERPL-MCNC: 0.7 MG/DL (ref 0–1)
BUN SERPL-MCNC: 12 MG/DL (ref 8–22)
CALCIUM SERPL-MCNC: 9.8 MG/DL (ref 8.5–10.5)
CHLORIDE BLD-SCNC: 104 MMOL/L (ref 98–107)
CO2 SERPL-SCNC: 30 MMOL/L (ref 22–31)
CREAT SERPL-MCNC: 0.91 MG/DL (ref 0.7–1.3)
GFR SERPL CREATININE-BSD FRML MDRD: >60 ML/MIN/1.73M2
GLUCOSE BLD-MCNC: 144 MG/DL (ref 70–125)
HBA1C MFR BLD: 7.1 % (ref 3.5–6)
HGB BLD-MCNC: 15.2 G/DL (ref 14–18)
POTASSIUM BLD-SCNC: 5 MMOL/L (ref 3.5–5)
PROT SERPL-MCNC: 7.2 G/DL (ref 6–8)
SODIUM SERPL-SCNC: 143 MMOL/L (ref 136–145)

## 2019-04-01 ASSESSMENT — MIFFLIN-ST. JEOR: SCORE: 1786.77

## 2019-04-02 ENCOUNTER — COMMUNICATION - HEALTHEAST (OUTPATIENT)
Dept: FAMILY MEDICINE | Facility: CLINIC | Age: 69
End: 2019-04-02

## 2019-04-03 ENCOUNTER — OFFICE VISIT - HEALTHEAST (OUTPATIENT)
Dept: CARDIOLOGY | Facility: CLINIC | Age: 69
End: 2019-04-03

## 2019-04-03 DIAGNOSIS — Z01.818 PREOP GENERAL PHYSICAL EXAM: ICD-10-CM

## 2019-04-03 DIAGNOSIS — I25.10 CORONARY ARTERY DISEASE INVOLVING NATIVE CORONARY ARTERY OF NATIVE HEART WITHOUT ANGINA PECTORIS: ICD-10-CM

## 2019-04-03 LAB
ATRIAL RATE - MUSE: 78 BPM
DIASTOLIC BLOOD PRESSURE - MUSE: NORMAL MMHG
INTERPRETATION ECG - MUSE: NORMAL
P AXIS - MUSE: 0 DEGREES
PR INTERVAL - MUSE: 190 MS
QRS DURATION - MUSE: 104 MS
QT - MUSE: 380 MS
QTC - MUSE: 433 MS
R AXIS - MUSE: 260 DEGREES
SYSTOLIC BLOOD PRESSURE - MUSE: NORMAL MMHG
T AXIS - MUSE: 49 DEGREES
VENTRICULAR RATE- MUSE: 78 BPM

## 2019-04-03 ASSESSMENT — MIFFLIN-ST. JEOR: SCORE: 1802.93

## 2019-04-08 ENCOUNTER — COMMUNICATION - HEALTHEAST (OUTPATIENT)
Dept: FAMILY MEDICINE | Facility: CLINIC | Age: 69
End: 2019-04-08

## 2019-04-08 DIAGNOSIS — Z71.6 ENCOUNTER FOR SMOKING CESSATION COUNSELING: ICD-10-CM

## 2019-04-08 DIAGNOSIS — E11.9 TYPE 2 DIABETES MELLITUS (H): ICD-10-CM

## 2019-04-09 ENCOUNTER — SURGERY - HEALTHEAST (OUTPATIENT)
Dept: SURGERY | Facility: CLINIC | Age: 69
End: 2019-04-09

## 2019-04-09 ENCOUNTER — HOSPITAL ENCOUNTER (OUTPATIENT)
Dept: INTERVENTIONAL RADIOLOGY/VASCULAR | Facility: CLINIC | Age: 69
Discharge: HOME | End: 2019-04-09
Attending: SURGERY

## 2019-04-09 ENCOUNTER — ANESTHESIA - HEALTHEAST (OUTPATIENT)
Dept: SURGERY | Facility: CLINIC | Age: 69
End: 2019-04-09

## 2019-04-09 DIAGNOSIS — I71.40 ABDOMINAL AORTIC ANEURYSM WITHOUT RUPTURE (H): ICD-10-CM

## 2019-04-09 LAB
ABO/RH(D): NORMAL
ANION GAP SERPL CALCULATED.3IONS-SCNC: 7 MMOL/L (ref 5–18)
ANTIBODY SCREEN: NEGATIVE
BUN SERPL-MCNC: 14 MG/DL (ref 8–22)
CALCIUM SERPL-MCNC: 9.7 MG/DL (ref 8.5–10.5)
CHLORIDE BLD-SCNC: 105 MMOL/L (ref 98–107)
CO2 SERPL-SCNC: 27 MMOL/L (ref 22–31)
CREAT SERPL-MCNC: 0.87 MG/DL (ref 0.7–1.3)
ERYTHROCYTE [DISTWIDTH] IN BLOOD BY AUTOMATED COUNT: 12.4 % (ref 11–14.5)
GFR SERPL CREATININE-BSD FRML MDRD: >60 ML/MIN/1.73M2
GLUCOSE BLD-MCNC: 161 MG/DL (ref 70–125)
HCT VFR BLD AUTO: 46.6 % (ref 40–54)
HGB BLD-MCNC: 14.5 G/DL (ref 14–18)
MCH RBC QN AUTO: 31.3 PG (ref 27–34)
MCHC RBC AUTO-ENTMCNC: 31.1 G/DL (ref 32–36)
MCV RBC AUTO: 101 FL (ref 80–100)
PLATELET # BLD AUTO: 164 THOU/UL (ref 140–440)
PMV BLD AUTO: 10.2 FL (ref 8.5–12.5)
POTASSIUM BLD-SCNC: 4.2 MMOL/L (ref 3.5–5)
RBC # BLD AUTO: 4.63 MILL/UL (ref 4.4–6.2)
SODIUM SERPL-SCNC: 139 MMOL/L (ref 136–145)
WBC: 6.8 THOU/UL (ref 4–11)

## 2019-04-09 ASSESSMENT — MIFFLIN-ST. JEOR
SCORE: 1792.04
SCORE: 1769.19

## 2019-04-10 ASSESSMENT — MIFFLIN-ST. JEOR: SCORE: 1793.86

## 2019-04-11 ENCOUNTER — COMMUNICATION - HEALTHEAST (OUTPATIENT)
Dept: VASCULAR SURGERY | Facility: CLINIC | Age: 69
End: 2019-04-11

## 2019-04-11 ENCOUNTER — COMMUNICATION - HEALTHEAST (OUTPATIENT)
Dept: CARE COORDINATION | Facility: CLINIC | Age: 69
End: 2019-04-11

## 2019-04-14 ENCOUNTER — COMMUNICATION - HEALTHEAST (OUTPATIENT)
Dept: SCHEDULING | Facility: CLINIC | Age: 69
End: 2019-04-14

## 2019-04-17 ENCOUNTER — AMBULATORY - HEALTHEAST (OUTPATIENT)
Dept: PHARMACY | Facility: CLINIC | Age: 69
End: 2019-04-17

## 2019-04-17 ENCOUNTER — OFFICE VISIT - HEALTHEAST (OUTPATIENT)
Dept: FAMILY MEDICINE | Facility: CLINIC | Age: 69
End: 2019-04-17

## 2019-04-17 DIAGNOSIS — F41.9 ANXIETY: ICD-10-CM

## 2019-04-17 DIAGNOSIS — G47.33 OBSTRUCTIVE SLEEP APNEA (ADULT) (PEDIATRIC): ICD-10-CM

## 2019-04-17 DIAGNOSIS — I71.40 AAA (ABDOMINAL AORTIC ANEURYSM) WITHOUT RUPTURE (H): ICD-10-CM

## 2019-04-17 DIAGNOSIS — E11.65 TYPE 2 DIABETES MELLITUS WITH HYPERGLYCEMIA, WITHOUT LONG-TERM CURRENT USE OF INSULIN (H): ICD-10-CM

## 2019-04-17 DIAGNOSIS — J44.9 CHRONIC OBSTRUCTIVE PULMONARY DISEASE, UNSPECIFIED COPD TYPE (H): ICD-10-CM

## 2019-04-17 DIAGNOSIS — I71.40 ABDOMINAL AORTIC ANEURYSM (AAA) WITHOUT RUPTURE (H): ICD-10-CM

## 2019-04-17 DIAGNOSIS — E11.9 TYPE 2 DIABETES MELLITUS WITHOUT COMPLICATION, WITHOUT LONG-TERM CURRENT USE OF INSULIN (H): ICD-10-CM

## 2019-04-17 DIAGNOSIS — N40.1 BENIGN PROSTATIC HYPERPLASIA WITH WEAK URINARY STREAM: ICD-10-CM

## 2019-04-17 DIAGNOSIS — R39.12 BENIGN PROSTATIC HYPERPLASIA WITH WEAK URINARY STREAM: ICD-10-CM

## 2019-04-17 LAB
ALBUMIN SERPL-MCNC: 3.4 G/DL (ref 3.5–5)
ALP SERPL-CCNC: 68 U/L (ref 45–120)
ALT SERPL W P-5'-P-CCNC: 22 U/L (ref 0–45)
ANION GAP SERPL CALCULATED.3IONS-SCNC: 11 MMOL/L (ref 5–18)
AST SERPL W P-5'-P-CCNC: 19 U/L (ref 0–40)
BILIRUB SERPL-MCNC: 0.5 MG/DL (ref 0–1)
BUN SERPL-MCNC: 14 MG/DL (ref 8–22)
CALCIUM SERPL-MCNC: 9.2 MG/DL (ref 8.5–10.5)
CHLORIDE BLD-SCNC: 99 MMOL/L (ref 98–107)
CO2 SERPL-SCNC: 26 MMOL/L (ref 22–31)
CREAT SERPL-MCNC: 0.97 MG/DL (ref 0.7–1.3)
ERYTHROCYTE [DISTWIDTH] IN BLOOD BY AUTOMATED COUNT: 11.5 % (ref 11–14.5)
GFR SERPL CREATININE-BSD FRML MDRD: >60 ML/MIN/1.73M2
GLUCOSE BLD-MCNC: 161 MG/DL (ref 70–125)
HBA1C MFR BLD: 7 % (ref 3.5–6)
HCT VFR BLD AUTO: 41.3 % (ref 40–54)
HGB BLD-MCNC: 13.4 G/DL (ref 14–18)
MCH RBC QN AUTO: 32 PG (ref 27–34)
MCHC RBC AUTO-ENTMCNC: 32.5 G/DL (ref 32–36)
MCV RBC AUTO: 98 FL (ref 80–100)
PLATELET # BLD AUTO: 273 THOU/UL (ref 140–440)
PMV BLD AUTO: 6.9 FL (ref 7–10)
POTASSIUM BLD-SCNC: 4.6 MMOL/L (ref 3.5–5)
PROT SERPL-MCNC: 6.8 G/DL (ref 6–8)
RBC # BLD AUTO: 4.2 MILL/UL (ref 4.4–6.2)
SODIUM SERPL-SCNC: 136 MMOL/L (ref 136–145)
WBC: 7.1 THOU/UL (ref 4–11)

## 2019-04-17 RX ORDER — ACETAMINOPHEN 500 MG
500-1000 TABLET ORAL
Status: SHIPPED | COMMUNITY
Start: 2019-04-17

## 2019-04-18 ENCOUNTER — COMMUNICATION - HEALTHEAST (OUTPATIENT)
Dept: FAMILY MEDICINE | Facility: CLINIC | Age: 69
End: 2019-04-18

## 2019-04-22 ENCOUNTER — OFFICE VISIT - HEALTHEAST (OUTPATIENT)
Dept: PHARMACY | Facility: CLINIC | Age: 69
End: 2019-04-22

## 2019-04-22 DIAGNOSIS — E11.9 TYPE 2 DIABETES MELLITUS WITHOUT COMPLICATION, WITHOUT LONG-TERM CURRENT USE OF INSULIN (H): ICD-10-CM

## 2019-04-23 ENCOUNTER — COMMUNICATION - HEALTHEAST (OUTPATIENT)
Dept: FAMILY MEDICINE | Facility: CLINIC | Age: 69
End: 2019-04-23

## 2019-04-29 ENCOUNTER — RECORDS - HEALTHEAST (OUTPATIENT)
Dept: ADMINISTRATIVE | Facility: OTHER | Age: 69
End: 2019-04-29

## 2019-05-06 ENCOUNTER — COMMUNICATION - HEALTHEAST (OUTPATIENT)
Dept: FAMILY MEDICINE | Facility: CLINIC | Age: 69
End: 2019-05-06

## 2019-05-08 ENCOUNTER — OFFICE VISIT - HEALTHEAST (OUTPATIENT)
Dept: VASCULAR SURGERY | Facility: CLINIC | Age: 69
End: 2019-05-08

## 2019-05-08 DIAGNOSIS — I71.40 ANEURYSM OF ABDOMINAL VESSEL (H): ICD-10-CM

## 2019-05-10 ENCOUNTER — COMMUNICATION - HEALTHEAST (OUTPATIENT)
Dept: FAMILY MEDICINE | Facility: CLINIC | Age: 69
End: 2019-05-10

## 2019-05-10 DIAGNOSIS — E11.9 TYPE 2 DIABETES MELLITUS WITHOUT COMPLICATION, WITHOUT LONG-TERM CURRENT USE OF INSULIN (H): ICD-10-CM

## 2019-05-15 ENCOUNTER — COMMUNICATION - HEALTHEAST (OUTPATIENT)
Dept: FAMILY MEDICINE | Facility: CLINIC | Age: 69
End: 2019-05-15

## 2019-05-21 ENCOUNTER — AMBULATORY - HEALTHEAST (OUTPATIENT)
Dept: EDUCATION SERVICES | Facility: CLINIC | Age: 69
End: 2019-05-21

## 2019-05-21 DIAGNOSIS — E11.9 TYPE 2 DIABETES MELLITUS WITHOUT COMPLICATION, WITHOUT LONG-TERM CURRENT USE OF INSULIN (H): ICD-10-CM

## 2019-05-29 ENCOUNTER — COMMUNICATION - HEALTHEAST (OUTPATIENT)
Dept: FAMILY MEDICINE | Facility: CLINIC | Age: 69
End: 2019-05-29

## 2019-05-29 DIAGNOSIS — I10 ESSENTIAL HYPERTENSION: ICD-10-CM

## 2019-07-01 ENCOUNTER — COMMUNICATION - HEALTHEAST (OUTPATIENT)
Dept: FAMILY MEDICINE | Facility: CLINIC | Age: 69
End: 2019-07-01

## 2019-07-23 ENCOUNTER — RECORDS - HEALTHEAST (OUTPATIENT)
Dept: ADMINISTRATIVE | Facility: OTHER | Age: 69
End: 2019-07-23

## 2019-07-30 ENCOUNTER — RECORDS - HEALTHEAST (OUTPATIENT)
Dept: HEALTH INFORMATION MANAGEMENT | Facility: CLINIC | Age: 69
End: 2019-07-30

## 2019-08-16 ENCOUNTER — RECORDS - HEALTHEAST (OUTPATIENT)
Dept: ADMINISTRATIVE | Facility: OTHER | Age: 69
End: 2019-08-16

## 2019-08-19 ENCOUNTER — COMMUNICATION - HEALTHEAST (OUTPATIENT)
Dept: SCHEDULING | Facility: CLINIC | Age: 69
End: 2019-08-19

## 2019-08-21 ENCOUNTER — COMMUNICATION - HEALTHEAST (OUTPATIENT)
Dept: CARE COORDINATION | Facility: CLINIC | Age: 69
End: 2019-08-21

## 2019-08-23 ENCOUNTER — RECORDS - HEALTHEAST (OUTPATIENT)
Dept: HEALTH INFORMATION MANAGEMENT | Facility: CLINIC | Age: 69
End: 2019-08-23

## 2019-08-23 ENCOUNTER — OFFICE VISIT - HEALTHEAST (OUTPATIENT)
Dept: FAMILY MEDICINE | Facility: CLINIC | Age: 69
End: 2019-08-23

## 2019-08-23 ENCOUNTER — COMMUNICATION - HEALTHEAST (OUTPATIENT)
Dept: PHARMACY | Facility: CLINIC | Age: 69
End: 2019-08-23

## 2019-08-23 ENCOUNTER — COMMUNICATION - HEALTHEAST (OUTPATIENT)
Dept: FAMILY MEDICINE | Facility: CLINIC | Age: 69
End: 2019-08-23

## 2019-08-23 DIAGNOSIS — R07.89 CHEST WALL PAIN: ICD-10-CM

## 2019-08-23 DIAGNOSIS — I10 ESSENTIAL HYPERTENSION: ICD-10-CM

## 2019-08-23 DIAGNOSIS — E11.65 TYPE 2 DIABETES MELLITUS WITH HYPERGLYCEMIA, WITHOUT LONG-TERM CURRENT USE OF INSULIN (H): ICD-10-CM

## 2019-08-23 DIAGNOSIS — E78.2 MIXED HYPERLIPIDEMIA: ICD-10-CM

## 2019-08-23 DIAGNOSIS — G47.33 OBSTRUCTIVE SLEEP APNEA (ADULT) (PEDIATRIC): ICD-10-CM

## 2019-08-23 LAB — HBA1C MFR BLD: 6.2 % (ref 3.5–6)

## 2019-08-23 ASSESSMENT — MIFFLIN-ST. JEOR: SCORE: 1797.26

## 2019-08-26 ENCOUNTER — COMMUNICATION - HEALTHEAST (OUTPATIENT)
Dept: FAMILY MEDICINE | Facility: CLINIC | Age: 69
End: 2019-08-26

## 2019-08-26 DIAGNOSIS — Z71.6 ENCOUNTER FOR SMOKING CESSATION COUNSELING: ICD-10-CM

## 2019-09-03 ENCOUNTER — COMMUNICATION - HEALTHEAST (OUTPATIENT)
Dept: FAMILY MEDICINE | Facility: CLINIC | Age: 69
End: 2019-09-03

## 2019-09-03 DIAGNOSIS — N40.1 BENIGN PROSTATIC HYPERPLASIA WITH URINARY FREQUENCY: ICD-10-CM

## 2019-09-03 DIAGNOSIS — R35.0 BENIGN PROSTATIC HYPERPLASIA WITH URINARY FREQUENCY: ICD-10-CM

## 2019-09-12 ENCOUNTER — COMMUNICATION - HEALTHEAST (OUTPATIENT)
Dept: FAMILY MEDICINE | Facility: CLINIC | Age: 69
End: 2019-09-12

## 2019-09-12 DIAGNOSIS — M54.9 BACK PAIN: ICD-10-CM

## 2019-09-30 ENCOUNTER — COMMUNICATION - HEALTHEAST (OUTPATIENT)
Dept: FAMILY MEDICINE | Facility: CLINIC | Age: 69
End: 2019-09-30

## 2019-09-30 DIAGNOSIS — G47.00 INSOMNIA: ICD-10-CM

## 2019-10-01 ENCOUNTER — COMMUNICATION - HEALTHEAST (OUTPATIENT)
Dept: FAMILY MEDICINE | Facility: CLINIC | Age: 69
End: 2019-10-01

## 2019-10-01 DIAGNOSIS — R35.0 BENIGN PROSTATIC HYPERPLASIA WITH URINARY FREQUENCY: ICD-10-CM

## 2019-10-01 DIAGNOSIS — N40.1 BENIGN PROSTATIC HYPERPLASIA WITH URINARY FREQUENCY: ICD-10-CM

## 2019-10-11 ENCOUNTER — COMMUNICATION - HEALTHEAST (OUTPATIENT)
Dept: FAMILY MEDICINE | Facility: CLINIC | Age: 69
End: 2019-10-11

## 2019-10-11 DIAGNOSIS — R35.0 BENIGN PROSTATIC HYPERPLASIA WITH URINARY FREQUENCY: ICD-10-CM

## 2019-10-11 DIAGNOSIS — G47.00 INSOMNIA: ICD-10-CM

## 2019-10-11 DIAGNOSIS — N40.1 BENIGN PROSTATIC HYPERPLASIA WITH URINARY FREQUENCY: ICD-10-CM

## 2019-10-15 ENCOUNTER — COMMUNICATION - HEALTHEAST (OUTPATIENT)
Dept: FAMILY MEDICINE | Facility: CLINIC | Age: 69
End: 2019-10-15

## 2019-10-15 DIAGNOSIS — I10 ESSENTIAL HYPERTENSION: ICD-10-CM

## 2019-10-27 ENCOUNTER — COMMUNICATION - HEALTHEAST (OUTPATIENT)
Dept: SCHEDULING | Facility: CLINIC | Age: 69
End: 2019-10-27

## 2019-11-04 ENCOUNTER — OFFICE VISIT - HEALTHEAST (OUTPATIENT)
Dept: FAMILY MEDICINE | Facility: CLINIC | Age: 69
End: 2019-11-04

## 2019-11-04 DIAGNOSIS — J44.9 CHRONIC OBSTRUCTIVE PULMONARY DISEASE, UNSPECIFIED COPD TYPE (H): ICD-10-CM

## 2019-11-04 DIAGNOSIS — E11.65 TYPE 2 DIABETES MELLITUS WITH HYPERGLYCEMIA, WITHOUT LONG-TERM CURRENT USE OF INSULIN (H): ICD-10-CM

## 2019-11-04 LAB
CREAT UR-MCNC: 75.2 MG/DL
MICROALBUMIN UR-MCNC: 0.85 MG/DL (ref 0–1.99)
MICROALBUMIN/CREAT UR: 11.3 MG/G

## 2019-11-04 ASSESSMENT — MIFFLIN-ST. JEOR: SCORE: 1772.03

## 2019-11-05 ENCOUNTER — COMMUNICATION - HEALTHEAST (OUTPATIENT)
Dept: FAMILY MEDICINE | Facility: CLINIC | Age: 69
End: 2019-11-05

## 2019-12-16 ENCOUNTER — COMMUNICATION - HEALTHEAST (OUTPATIENT)
Dept: FAMILY MEDICINE | Facility: CLINIC | Age: 69
End: 2019-12-16

## 2019-12-16 DIAGNOSIS — E78.5 HYPERLIPEMIA: ICD-10-CM

## 2020-03-15 ENCOUNTER — COMMUNICATION - HEALTHEAST (OUTPATIENT)
Dept: SCHEDULING | Facility: CLINIC | Age: 70
End: 2020-03-15

## 2020-03-16 ENCOUNTER — OFFICE VISIT - HEALTHEAST (OUTPATIENT)
Dept: FAMILY MEDICINE | Facility: CLINIC | Age: 70
End: 2020-03-16

## 2020-03-16 DIAGNOSIS — R63.4 WEIGHT LOSS: ICD-10-CM

## 2020-03-16 DIAGNOSIS — M54.50 CHRONIC BILATERAL LOW BACK PAIN, UNSPECIFIED WHETHER SCIATICA PRESENT: ICD-10-CM
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
## 2020-03-16 DIAGNOSIS — J44.9 CHRONIC OBSTRUCTIVE PULMONARY DISEASE, UNSPECIFIED COPD TYPE (H): ICD-10-CM

## 2020-03-16 DIAGNOSIS — E11.42 TYPE 2 DIABETES MELLITUS WITH DIABETIC POLYNEUROPATHY, WITHOUT LONG-TERM CURRENT USE OF INSULIN (H): ICD-10-CM

## 2020-03-16 DIAGNOSIS — I10 ESSENTIAL HYPERTENSION: ICD-10-CM

## 2020-03-16 DIAGNOSIS — G89.29 CHRONIC BILATERAL LOW BACK PAIN, UNSPECIFIED WHETHER SCIATICA PRESENT: ICD-10-CM

## 2020-03-16 DIAGNOSIS — M79.672 PAIN IN BOTH FEET: ICD-10-CM

## 2020-03-16 DIAGNOSIS — M79.671 PAIN IN BOTH FEET: ICD-10-CM

## 2020-03-16 DIAGNOSIS — I71.40 ANEURYSM OF ABDOMINAL VESSEL (H): ICD-10-CM

## 2020-03-16 LAB
ALBUMIN SERPL-MCNC: 3.7 G/DL (ref 3.5–5)
ALP SERPL-CCNC: 77 U/L (ref 45–120)
ALT SERPL W P-5'-P-CCNC: 16 U/L (ref 0–45)
ANION GAP SERPL CALCULATED.3IONS-SCNC: 10 MMOL/L (ref 5–18)
AST SERPL W P-5'-P-CCNC: 16 U/L (ref 0–40)
BASOPHILS # BLD AUTO: 0 THOU/UL (ref 0–0.2)
BASOPHILS NFR BLD AUTO: 1 % (ref 0–2)
BILIRUB SERPL-MCNC: 0.7 MG/DL (ref 0–1)
BUN SERPL-MCNC: 12 MG/DL (ref 8–28)
CALCIUM SERPL-MCNC: 9.3 MG/DL (ref 8.5–10.5)
CHLORIDE BLD-SCNC: 102 MMOL/L (ref 98–107)
CHOLEST SERPL-MCNC: 133 MG/DL
CO2 SERPL-SCNC: 27 MMOL/L (ref 22–31)
CREAT SERPL-MCNC: 0.8 MG/DL (ref 0.7–1.3)
EOSINOPHIL # BLD AUTO: 0.1 THOU/UL (ref 0–0.4)
EOSINOPHIL NFR BLD AUTO: 2 % (ref 0–6)
ERYTHROCYTE [DISTWIDTH] IN BLOOD BY AUTOMATED COUNT: 12.5 % (ref 11–14.5)
FASTING STATUS PATIENT QL REPORTED: YES
GFR SERPL CREATININE-BSD FRML MDRD: >60 ML/MIN/1.73M2
GLUCOSE BLD-MCNC: 102 MG/DL (ref 70–125)
HBA1C MFR BLD: 5.6 % (ref 3.5–6)
HCT VFR BLD AUTO: 43.3 % (ref 40–54)
HDLC SERPL-MCNC: 42 MG/DL
HGB BLD-MCNC: 14.8 G/DL (ref 14–18)
LDLC SERPL CALC-MCNC: 75 MG/DL
LYMPHOCYTES # BLD AUTO: 1.4 THOU/UL (ref 0.8–4.4)
LYMPHOCYTES NFR BLD AUTO: 19 % (ref 20–40)
MCH RBC QN AUTO: 32.6 PG (ref 27–34)
MCHC RBC AUTO-ENTMCNC: 34.2 G/DL (ref 32–36)
MCV RBC AUTO: 95 FL (ref 80–100)
MONOCYTES # BLD AUTO: 0.5 THOU/UL (ref 0–0.9)
MONOCYTES NFR BLD AUTO: 6 % (ref 2–10)
NEUTROPHILS # BLD AUTO: 5.4 THOU/UL (ref 2–7.7)
NEUTROPHILS NFR BLD AUTO: 72 % (ref 50–70)
PLATELET # BLD AUTO: 200 THOU/UL (ref 140–440)
PMV BLD AUTO: 7.9 FL (ref 7–10)
POTASSIUM BLD-SCNC: 4.3 MMOL/L (ref 3.5–5)
PROT SERPL-MCNC: 6.8 G/DL (ref 6–8)
RBC # BLD AUTO: 4.54 MILL/UL (ref 4.4–6.2)
SODIUM SERPL-SCNC: 139 MMOL/L (ref 136–145)
TRIGL SERPL-MCNC: 79 MG/DL
TSH SERPL DL<=0.005 MIU/L-ACNC: 1.19 UIU/ML (ref 0.3–5)
WBC: 7.4 THOU/UL (ref 4–11)

## 2020-03-16 RX ORDER — GABAPENTIN 300 MG/1
CAPSULE ORAL
Qty: 720 CAPSULE | Refills: 3 | Status: SHIPPED
Start: 2020-03-16

## 2020-03-16 ASSESSMENT — MIFFLIN-ST. JEOR: SCORE: 1724.69

## 2020-03-17 ENCOUNTER — COMMUNICATION - HEALTHEAST (OUTPATIENT)
Dept: FAMILY MEDICINE | Facility: CLINIC | Age: 70
End: 2020-03-17

## 2020-03-24 ENCOUNTER — RECORDS - HEALTHEAST (OUTPATIENT)
Dept: ADMINISTRATIVE | Facility: OTHER | Age: 70
End: 2020-03-24

## 2020-03-26 ENCOUNTER — NURSE TRIAGE (OUTPATIENT)
Dept: NURSING | Facility: CLINIC | Age: 70
End: 2020-03-26

## 2020-03-26 NOTE — TELEPHONE ENCOUNTER
Pt calling because is has questions about what is a high Temp. Pt stated that he takes his temp frequently and its at 99 degrees right now.      Pt stated that his Wife is coming out of the [place] tomorrow and he is concerned that he is going to get sick and not be able to care for her when she gets home.  Wife is coming home tomorrow.     [place] Department of health phone number given for information on COVID 19    Writer discussed that a high temp is greater than 101 for 12 hours.  That if he is concerned that he should monitor his temp ever 3 hours and call back if greater than 101 for 12 hours.    Pt should also discuss concerns with Wife's NH.      Pt verbalized understanding and had no further questions.    Mitchell Mederos RN on 3/26/2020 at 6:01 PM          Reason for Disposition    Health Information question, no triage required and triager able to answer question    Protocols used: INFORMATION ONLY CALL-A-

## 2020-05-07 ENCOUNTER — RECORDS - HEALTHEAST (OUTPATIENT)
Dept: RADIOLOGY | Facility: CLINIC | Age: 70
End: 2020-05-07

## 2020-05-08 ENCOUNTER — RECORDS - HEALTHEAST (OUTPATIENT)
Dept: RADIOLOGY | Facility: CLINIC | Age: 70
End: 2020-05-08

## 2020-05-13 ENCOUNTER — OFFICE VISIT - HEALTHEAST (OUTPATIENT)
Dept: VASCULAR SURGERY | Facility: CLINIC | Age: 70
End: 2020-05-13

## 2020-05-13 DIAGNOSIS — I71.40 ANEURYSM OF ABDOMINAL VESSEL (H): ICD-10-CM

## 2020-05-13 ASSESSMENT — MIFFLIN-ST. JEOR: SCORE: 1724.69

## 2020-05-27 ENCOUNTER — COMMUNICATION - HEALTHEAST (OUTPATIENT)
Dept: FAMILY MEDICINE | Facility: CLINIC | Age: 70
End: 2020-05-27

## 2020-05-27 DIAGNOSIS — Z71.6 ENCOUNTER FOR SMOKING CESSATION COUNSELING: ICD-10-CM

## 2020-06-01 RX ORDER — BUPROPION HYDROCHLORIDE 150 MG/1
TABLET, EXTENDED RELEASE ORAL
Qty: 180 TABLET | Refills: 2 | Status: SHIPPED | OUTPATIENT
Start: 2020-06-01

## 2020-06-19 ENCOUNTER — COMMUNICATION - HEALTHEAST (OUTPATIENT)
Dept: FAMILY MEDICINE | Facility: CLINIC | Age: 70
End: 2020-06-19

## 2020-06-20 RX ORDER — BLOOD SUGAR DIAGNOSTIC
STRIP MISCELLANEOUS
Qty: 200 STRIP | Refills: 3 | Status: SHIPPED | OUTPATIENT
Start: 2020-06-20

## 2020-07-18 ENCOUNTER — COMMUNICATION - HEALTHEAST (OUTPATIENT)
Dept: SCHEDULING | Facility: CLINIC | Age: 70
End: 2020-07-18

## 2020-07-18 ENCOUNTER — COMMUNICATION - HEALTHEAST (OUTPATIENT)
Dept: FAMILY MEDICINE | Facility: CLINIC | Age: 70
End: 2020-07-18

## 2020-07-18 DIAGNOSIS — E11.9 TYPE 2 DIABETES MELLITUS (H): ICD-10-CM

## 2020-08-05 ENCOUNTER — RECORDS - HEALTHEAST (OUTPATIENT)
Dept: ADMINISTRATIVE | Facility: OTHER | Age: 70
End: 2020-08-05

## 2020-08-13 ENCOUNTER — RECORDS - HEALTHEAST (OUTPATIENT)
Dept: ADMINISTRATIVE | Facility: OTHER | Age: 70
End: 2020-08-13

## 2020-08-17 ENCOUNTER — COMMUNICATION - HEALTHEAST (OUTPATIENT)
Dept: FAMILY MEDICINE | Facility: CLINIC | Age: 70
End: 2020-08-17

## 2020-08-17 DIAGNOSIS — E11.9 TYPE 2 DIABETES MELLITUS (H): ICD-10-CM

## 2020-08-21 ENCOUNTER — RECORDS - HEALTHEAST (OUTPATIENT)
Dept: ADMINISTRATIVE | Facility: OTHER | Age: 70
End: 2020-08-21

## 2020-08-21 LAB — RETINOPATHY: NEGATIVE

## 2020-08-23 ENCOUNTER — COMMUNICATION - HEALTHEAST (OUTPATIENT)
Dept: FAMILY MEDICINE | Facility: CLINIC | Age: 70
End: 2020-08-23

## 2020-08-23 DIAGNOSIS — I10 ESSENTIAL HYPERTENSION: ICD-10-CM

## 2020-08-25 RX ORDER — LISINOPRIL 20 MG/1
TABLET ORAL
Qty: 180 TABLET | Refills: 1 | Status: SHIPPED | OUTPATIENT
Start: 2020-08-25

## 2020-08-27 ENCOUNTER — RECORDS - HEALTHEAST (OUTPATIENT)
Dept: HEALTH INFORMATION MANAGEMENT | Facility: CLINIC | Age: 70
End: 2020-08-27

## 2020-09-04 ENCOUNTER — COMMUNICATION - HEALTHEAST (OUTPATIENT)
Dept: FAMILY MEDICINE | Facility: CLINIC | Age: 70
End: 2020-09-04

## 2020-09-04 DIAGNOSIS — N40.1 BENIGN PROSTATIC HYPERPLASIA WITH URINARY FREQUENCY: ICD-10-CM

## 2020-09-04 DIAGNOSIS — R35.0 BENIGN PROSTATIC HYPERPLASIA WITH URINARY FREQUENCY: ICD-10-CM

## 2020-09-17 ENCOUNTER — COMMUNICATION - HEALTHEAST (OUTPATIENT)
Dept: FAMILY MEDICINE | Facility: CLINIC | Age: 70
End: 2020-09-17

## 2020-09-17 DIAGNOSIS — J44.9 CHRONIC OBSTRUCTIVE PULMONARY DISEASE, UNSPECIFIED COPD TYPE (H): ICD-10-CM

## 2020-10-14 ENCOUNTER — COMMUNICATION - HEALTHEAST (OUTPATIENT)
Dept: SCHEDULING | Facility: CLINIC | Age: 70
End: 2020-10-14

## 2020-10-14 DIAGNOSIS — E11.9 TYPE 2 DIABETES MELLITUS (H): ICD-10-CM

## 2020-10-20 ENCOUNTER — COMMUNICATION - HEALTHEAST (OUTPATIENT)
Dept: FAMILY MEDICINE | Facility: CLINIC | Age: 70
End: 2020-10-20

## 2020-10-29 ENCOUNTER — OFFICE VISIT - HEALTHEAST (OUTPATIENT)
Dept: FAMILY MEDICINE | Facility: CLINIC | Age: 70
End: 2020-10-29

## 2020-10-29 DIAGNOSIS — G62.9 PERIPHERAL POLYNEUROPATHY: ICD-10-CM

## 2020-10-29 DIAGNOSIS — E11.42 TYPE 2 DIABETES MELLITUS WITH DIABETIC POLYNEUROPATHY, WITHOUT LONG-TERM CURRENT USE OF INSULIN (H): ICD-10-CM

## 2020-10-29 DIAGNOSIS — J44.9 CHRONIC OBSTRUCTIVE PULMONARY DISEASE, UNSPECIFIED COPD TYPE (H): ICD-10-CM

## 2020-10-29 DIAGNOSIS — E78.2 MIXED HYPERLIPIDEMIA: ICD-10-CM

## 2020-10-29 DIAGNOSIS — Z23 FLU VACCINE NEED: ICD-10-CM

## 2020-10-29 DIAGNOSIS — R79.89 LOW TESTOSTERONE: ICD-10-CM

## 2020-10-29 DIAGNOSIS — I10 ESSENTIAL HYPERTENSION: ICD-10-CM

## 2020-10-29 LAB
ALBUMIN SERPL-MCNC: 3.9 G/DL (ref 3.5–5)
ALP SERPL-CCNC: 68 U/L (ref 45–120)
ALT SERPL W P-5'-P-CCNC: 14 U/L (ref 0–45)
ANION GAP SERPL CALCULATED.3IONS-SCNC: 10 MMOL/L (ref 5–18)
AST SERPL W P-5'-P-CCNC: 15 U/L (ref 0–40)
BILIRUB SERPL-MCNC: 0.5 MG/DL (ref 0–1)
BUN SERPL-MCNC: 15 MG/DL (ref 8–28)
CALCIUM SERPL-MCNC: 9.2 MG/DL (ref 8.5–10.5)
CHLORIDE BLD-SCNC: 104 MMOL/L (ref 98–107)
CHOLEST SERPL-MCNC: 127 MG/DL
CO2 SERPL-SCNC: 27 MMOL/L (ref 22–31)
CREAT SERPL-MCNC: 1.28 MG/DL (ref 0.7–1.3)
FASTING STATUS PATIENT QL REPORTED: YES
FOLATE SERPL-MCNC: 10.1 NG/ML
GFR SERPL CREATININE-BSD FRML MDRD: 55 ML/MIN/1.73M2
GLUCOSE BLD-MCNC: 92 MG/DL (ref 70–125)
HBA1C MFR BLD: 5.4 %
HDLC SERPL-MCNC: 39 MG/DL
LDLC SERPL CALC-MCNC: 66 MG/DL
POTASSIUM BLD-SCNC: 4.8 MMOL/L (ref 3.5–5)
PROT SERPL-MCNC: 7.1 G/DL (ref 6–8)
SODIUM SERPL-SCNC: 141 MMOL/L (ref 136–145)
TRIGL SERPL-MCNC: 108 MG/DL
VIT B12 SERPL-MCNC: 203 PG/ML (ref 213–816)

## 2020-10-30 LAB — TESTOST SERPL-MCNC: 255 NG/DL (ref 221–716)

## 2020-11-03 ENCOUNTER — AMBULATORY - HEALTHEAST (OUTPATIENT)
Dept: FAMILY MEDICINE | Facility: CLINIC | Age: 70
End: 2020-11-03

## 2020-11-03 ENCOUNTER — COMMUNICATION - HEALTHEAST (OUTPATIENT)
Dept: FAMILY MEDICINE | Facility: CLINIC | Age: 70
End: 2020-11-03

## 2020-11-03 DIAGNOSIS — E53.8 VITAMIN B12 DEFICIENCY (NON ANEMIC): ICD-10-CM

## 2020-11-19 ENCOUNTER — COMMUNICATION - HEALTHEAST (OUTPATIENT)
Dept: FAMILY MEDICINE | Facility: CLINIC | Age: 70
End: 2020-11-19

## 2020-11-19 DIAGNOSIS — E11.42 TYPE 2 DIABETES MELLITUS WITH DIABETIC POLYNEUROPATHY, WITHOUT LONG-TERM CURRENT USE OF INSULIN (H): ICD-10-CM

## 2020-11-20 RX ORDER — METFORMIN HCL 500 MG
TABLET, EXTENDED RELEASE 24 HR ORAL
Qty: 360 TABLET | Refills: 3 | Status: SHIPPED | OUTPATIENT
Start: 2020-11-20

## 2020-11-25 ENCOUNTER — COMMUNICATION - HEALTHEAST (OUTPATIENT)
Dept: FAMILY MEDICINE | Facility: CLINIC | Age: 70
End: 2020-11-25

## 2020-11-25 DIAGNOSIS — E78.5 HYPERLIPEMIA: ICD-10-CM

## 2020-11-26 RX ORDER — SIMVASTATIN 40 MG
TABLET ORAL
Qty: 135 TABLET | Refills: 3 | Status: SHIPPED | OUTPATIENT
Start: 2020-11-26

## 2021-01-01 ENCOUNTER — TRANSFERRED RECORDS (OUTPATIENT)
Dept: HEALTH INFORMATION MANAGEMENT | Facility: CLINIC | Age: 71
End: 2021-01-01

## 2021-01-01 ENCOUNTER — ONCOLOGY VISIT (OUTPATIENT)
Dept: ONCOLOGY | Facility: CLINIC | Age: 71
End: 2021-01-01
Attending: INTERNAL MEDICINE
Payer: COMMERCIAL

## 2021-01-01 ENCOUNTER — LAB REQUISITION (OUTPATIENT)
Dept: LAB | Facility: CLINIC | Age: 71
End: 2021-01-01
Payer: COMMERCIAL

## 2021-01-01 ENCOUNTER — PATIENT OUTREACH (OUTPATIENT)
Dept: ONCOLOGY | Facility: CLINIC | Age: 71
End: 2021-01-01

## 2021-01-01 ENCOUNTER — RECORDS - HEALTHEAST (OUTPATIENT)
Dept: ADMINISTRATIVE | Facility: CLINIC | Age: 71
End: 2021-01-01

## 2021-01-01 ENCOUNTER — RECORDS - HEALTHEAST (OUTPATIENT)
Dept: ADMINISTRATIVE | Facility: OTHER | Age: 71
End: 2021-01-01

## 2021-01-01 ENCOUNTER — APPOINTMENT (OUTPATIENT)
Dept: RADIOLOGY | Facility: CLINIC | Age: 71
End: 2021-01-01
Attending: EMERGENCY MEDICINE
Payer: COMMERCIAL

## 2021-01-01 ENCOUNTER — HOSPITAL ENCOUNTER (OUTPATIENT)
Dept: CT IMAGING | Facility: CLINIC | Age: 71
Discharge: HOME | End: 2021-10-08
Attending: STUDENT IN AN ORGANIZED HEALTH CARE EDUCATION/TRAINING PROGRAM | Admitting: STUDENT IN AN ORGANIZED HEALTH CARE EDUCATION/TRAINING PROGRAM
Payer: COMMERCIAL

## 2021-01-01 ENCOUNTER — PRE VISIT (OUTPATIENT)
Dept: ONCOLOGY | Facility: CLINIC | Age: 71
End: 2021-01-01

## 2021-01-01 ENCOUNTER — LAB (OUTPATIENT)
Dept: LAB | Facility: CLINIC | Age: 71
End: 2021-01-01
Attending: STUDENT IN AN ORGANIZED HEALTH CARE EDUCATION/TRAINING PROGRAM
Payer: COMMERCIAL

## 2021-01-01 ENCOUNTER — VIRTUAL VISIT (OUTPATIENT)
Dept: ONCOLOGY | Facility: CLINIC | Age: 71
End: 2021-01-01
Attending: STUDENT IN AN ORGANIZED HEALTH CARE EDUCATION/TRAINING PROGRAM
Payer: COMMERCIAL

## 2021-01-01 ENCOUNTER — RECORDS - HEALTHEAST (OUTPATIENT)
Dept: LAB | Facility: CLINIC | Age: 71
End: 2021-01-01

## 2021-01-01 ENCOUNTER — ANESTHESIA EVENT (OUTPATIENT)
Dept: SURGERY | Facility: HOSPITAL | Age: 71
DRG: 435 | End: 2021-01-01
Payer: COMMERCIAL

## 2021-01-01 ENCOUNTER — APPOINTMENT (OUTPATIENT)
Dept: RADIOLOGY | Facility: HOSPITAL | Age: 71
DRG: 435 | End: 2021-01-01
Attending: INTERNAL MEDICINE
Payer: COMMERCIAL

## 2021-01-01 ENCOUNTER — COMMUNICATION - HEALTHEAST (OUTPATIENT)
Dept: FAMILY MEDICINE | Facility: CLINIC | Age: 71
End: 2021-01-01

## 2021-01-01 ENCOUNTER — HOSPITAL ENCOUNTER (OUTPATIENT)
Dept: CT IMAGING | Facility: HOSPITAL | Age: 71
Discharge: HOME | End: 2021-05-20
Attending: SURGERY
Payer: COMMERCIAL

## 2021-01-01 ENCOUNTER — HOSPITAL ENCOUNTER (EMERGENCY)
Facility: CLINIC | Age: 71
Discharge: OTHER INSTITUTION | End: 2021-09-25
Attending: EMERGENCY MEDICINE | Admitting: INTERNAL MEDICINE
Payer: COMMERCIAL

## 2021-01-01 ENCOUNTER — HOSPITAL ENCOUNTER (INPATIENT)
Facility: HOSPITAL | Age: 71
LOS: 1 days | Discharge: HOME | DRG: 435 | End: 2021-09-27
Attending: INTERNAL MEDICINE | Admitting: INTERNAL MEDICINE
Payer: COMMERCIAL

## 2021-01-01 ENCOUNTER — VIRTUAL VISIT (OUTPATIENT)
Dept: SLEEP MEDICINE | Facility: CLINIC | Age: 71
End: 2021-01-01
Payer: COMMERCIAL

## 2021-01-01 ENCOUNTER — PATIENT OUTREACH (OUTPATIENT)
Dept: CARE COORDINATION | Facility: CLINIC | Age: 71
End: 2021-01-01

## 2021-01-01 ENCOUNTER — TELEPHONE (OUTPATIENT)
Dept: VASCULAR SURGERY | Facility: CLINIC | Age: 71
End: 2021-01-01

## 2021-01-01 ENCOUNTER — TRANSCRIBE ORDERS (OUTPATIENT)
Dept: OTHER | Age: 71
End: 2021-01-01

## 2021-01-01 ENCOUNTER — RECORDS - HEALTHEAST (OUTPATIENT)
Dept: VASCULAR ULTRASOUND | Facility: CLINIC | Age: 71
End: 2021-01-01

## 2021-01-01 ENCOUNTER — OFFICE VISIT - HEALTHEAST (OUTPATIENT)
Dept: VASCULAR SURGERY | Facility: CLINIC | Age: 71
End: 2021-01-01

## 2021-01-01 ENCOUNTER — TELEPHONE (OUTPATIENT)
Dept: FAMILY MEDICINE | Facility: CLINIC | Age: 71
End: 2021-01-01

## 2021-01-01 ENCOUNTER — HOSPITAL ENCOUNTER (EMERGENCY)
Facility: CLINIC | Age: 71
Discharge: HOME | End: 2021-10-06
Attending: EMERGENCY MEDICINE | Admitting: EMERGENCY MEDICINE
Payer: COMMERCIAL

## 2021-01-01 ENCOUNTER — VIRTUAL VISIT (OUTPATIENT)
Dept: PALLIATIVE CARE | Facility: CLINIC | Age: 71
End: 2021-01-01
Attending: STUDENT IN AN ORGANIZED HEALTH CARE EDUCATION/TRAINING PROGRAM
Payer: COMMERCIAL

## 2021-01-01 ENCOUNTER — APPOINTMENT (OUTPATIENT)
Dept: CT IMAGING | Facility: CLINIC | Age: 71
End: 2021-01-01
Attending: EMERGENCY MEDICINE
Payer: COMMERCIAL

## 2021-01-01 ENCOUNTER — COMMUNICATION - HEALTHEAST (OUTPATIENT)
Dept: VASCULAR SURGERY | Facility: CLINIC | Age: 71
End: 2021-01-01

## 2021-01-01 ENCOUNTER — APPOINTMENT (OUTPATIENT)
Dept: MRI IMAGING | Facility: CLINIC | Age: 71
End: 2021-01-01
Attending: EMERGENCY MEDICINE
Payer: COMMERCIAL

## 2021-01-01 ENCOUNTER — TELEPHONE (OUTPATIENT)
Dept: INTERVENTIONAL RADIOLOGY/VASCULAR | Facility: CLINIC | Age: 71
End: 2021-01-01

## 2021-01-01 ENCOUNTER — ANESTHESIA (OUTPATIENT)
Dept: SURGERY | Facility: HOSPITAL | Age: 71
DRG: 435 | End: 2021-01-01
Payer: COMMERCIAL

## 2021-01-01 VITALS
BODY MASS INDEX: 27.53 KG/M2 | HEIGHT: 69 IN | DIASTOLIC BLOOD PRESSURE: 71 MMHG | HEIGHT: 70 IN | RESPIRATION RATE: 16 BRPM | TEMPERATURE: 98.4 F | WEIGHT: 185.9 LBS | OXYGEN SATURATION: 95 % | HEART RATE: 68 BPM | WEIGHT: 236.31 LBS | SYSTOLIC BLOOD PRESSURE: 126 MMHG | BODY MASS INDEX: 33.83 KG/M2

## 2021-01-01 VITALS — WEIGHT: 233 LBS | HEIGHT: 69 IN | BODY MASS INDEX: 34.51 KG/M2

## 2021-01-01 VITALS — WEIGHT: 234 LBS | HEIGHT: 69 IN | BODY MASS INDEX: 34.66 KG/M2

## 2021-01-01 VITALS
RESPIRATION RATE: 18 BRPM | BODY MASS INDEX: 26.82 KG/M2 | SYSTOLIC BLOOD PRESSURE: 116 MMHG | DIASTOLIC BLOOD PRESSURE: 72 MMHG | OXYGEN SATURATION: 96 % | TEMPERATURE: 98.4 F | HEART RATE: 86 BPM | WEIGHT: 179 LBS

## 2021-01-01 VITALS
HEIGHT: 69 IN | OXYGEN SATURATION: 94 % | HEART RATE: 80 BPM | SYSTOLIC BLOOD PRESSURE: 108 MMHG | TEMPERATURE: 98.2 F | DIASTOLIC BLOOD PRESSURE: 68 MMHG | BODY MASS INDEX: 27.53 KG/M2 | WEIGHT: 185.9 LBS

## 2021-01-01 VITALS — WEIGHT: 255 LBS | HEIGHT: 70 IN | BODY MASS INDEX: 36.51 KG/M2

## 2021-01-01 VITALS — WEIGHT: 255.9 LBS | BODY MASS INDEX: 36.63 KG/M2 | HEIGHT: 70 IN

## 2021-01-01 VITALS
RESPIRATION RATE: 16 BRPM | WEIGHT: 188.4 LBS | SYSTOLIC BLOOD PRESSURE: 142 MMHG | DIASTOLIC BLOOD PRESSURE: 77 MMHG | TEMPERATURE: 98.1 F | OXYGEN SATURATION: 98 % | BODY MASS INDEX: 28.23 KG/M2 | HEART RATE: 73 BPM

## 2021-01-01 VITALS — WEIGHT: 247.5 LBS | BODY MASS INDEX: 35.51 KG/M2

## 2021-01-01 VITALS — BODY MASS INDEX: 33.91 KG/M2 | WEIGHT: 236.31 LBS

## 2021-01-01 VITALS
WEIGHT: 227.44 LBS | DIASTOLIC BLOOD PRESSURE: 74 MMHG | OXYGEN SATURATION: 91 % | HEART RATE: 97 BPM | SYSTOLIC BLOOD PRESSURE: 118 MMHG | TEMPERATURE: 99.1 F | HEIGHT: 69 IN | BODY MASS INDEX: 33.68 KG/M2

## 2021-01-01 VITALS
HEART RATE: 60 BPM | SYSTOLIC BLOOD PRESSURE: 140 MMHG | RESPIRATION RATE: 16 BRPM | TEMPERATURE: 98.3 F | DIASTOLIC BLOOD PRESSURE: 78 MMHG

## 2021-01-01 VITALS — BODY MASS INDEX: 33.83 KG/M2 | HEIGHT: 70 IN | WEIGHT: 236.31 LBS

## 2021-01-01 VITALS — WEIGHT: 239.56 LBS | BODY MASS INDEX: 34.37 KG/M2

## 2021-01-01 VITALS — BODY MASS INDEX: 33.56 KG/M2 | WEIGHT: 233.9 LBS

## 2021-01-01 VITALS — WEIGHT: 251.9 LBS | BODY MASS INDEX: 36.14 KG/M2

## 2021-01-01 VITALS — WEIGHT: 258.2 LBS | BODY MASS INDEX: 37.05 KG/M2

## 2021-01-01 VITALS — BODY MASS INDEX: 33.79 KG/M2 | WEIGHT: 236 LBS | HEIGHT: 70 IN

## 2021-01-01 VITALS
WEIGHT: 185.3 LBS | BODY MASS INDEX: 28.08 KG/M2 | SYSTOLIC BLOOD PRESSURE: 127 MMHG | OXYGEN SATURATION: 92 % | DIASTOLIC BLOOD PRESSURE: 69 MMHG | HEIGHT: 68 IN | HEART RATE: 71 BPM | TEMPERATURE: 98.1 F | RESPIRATION RATE: 16 BRPM

## 2021-01-01 VITALS — WEIGHT: 237.9 LBS | BODY MASS INDEX: 34.06 KG/M2 | HEIGHT: 70 IN

## 2021-01-01 VITALS — BODY MASS INDEX: 36.12 KG/M2 | HEIGHT: 70 IN | WEIGHT: 252.3 LBS

## 2021-01-01 VITALS — BODY MASS INDEX: 32.81 KG/M2 | HEIGHT: 69 IN

## 2021-01-01 VITALS — BODY MASS INDEX: 35.03 KG/M2 | WEIGHT: 233.8 LBS

## 2021-01-01 VITALS — HEIGHT: 70 IN | WEIGHT: 242.9 LBS | BODY MASS INDEX: 34.77 KG/M2

## 2021-01-01 VITALS — WEIGHT: 216 LBS | HEIGHT: 69 IN | BODY MASS INDEX: 31.99 KG/M2

## 2021-01-01 VITALS — WEIGHT: 231.25 LBS | BODY MASS INDEX: 34.65 KG/M2

## 2021-01-01 VITALS — WEIGHT: 243 LBS | BODY MASS INDEX: 36.41 KG/M2

## 2021-01-01 VITALS — BODY MASS INDEX: 36.16 KG/M2 | WEIGHT: 252 LBS

## 2021-01-01 VITALS — BODY MASS INDEX: 36.33 KG/M2 | WEIGHT: 253.19 LBS

## 2021-01-01 VITALS — WEIGHT: 251 LBS | BODY MASS INDEX: 36.01 KG/M2

## 2021-01-01 VITALS — HEIGHT: 70 IN | WEIGHT: 255.8 LBS | BODY MASS INDEX: 36.62 KG/M2

## 2021-01-01 VITALS — WEIGHT: 232.44 LBS | BODY MASS INDEX: 34.43 KG/M2 | HEIGHT: 69 IN

## 2021-01-01 VITALS — HEIGHT: 69 IN | BODY MASS INDEX: 34.96 KG/M2 | WEIGHT: 236 LBS

## 2021-01-01 VITALS — HEIGHT: 70 IN | WEIGHT: 255 LBS | BODY MASS INDEX: 36.51 KG/M2

## 2021-01-01 VITALS — WEIGHT: 219 LBS

## 2021-01-01 VITALS — HEIGHT: 70 IN | WEIGHT: 236 LBS | BODY MASS INDEX: 33.79 KG/M2

## 2021-01-01 DIAGNOSIS — T40.2X5A THERAPEUTIC OPIOID INDUCED CONSTIPATION: ICD-10-CM

## 2021-01-01 DIAGNOSIS — J44.9 CHRONIC OBSTRUCTIVE PULMONARY DISEASE, UNSPECIFIED COPD TYPE (H): ICD-10-CM

## 2021-01-01 DIAGNOSIS — Z03.818 ENCOUNTER FOR OBSERVATION FOR SUSPECTED EXPOSURE TO OTHER BIOLOGICAL AGENTS RULED OUT: ICD-10-CM

## 2021-01-01 DIAGNOSIS — R10.816 EPIGASTRIC ABDOMINAL TENDERNESS: ICD-10-CM

## 2021-01-01 DIAGNOSIS — G47.33 OSA (OBSTRUCTIVE SLEEP APNEA): ICD-10-CM

## 2021-01-01 DIAGNOSIS — C25.0 CANCER OF HEAD OF PANCREAS (H): ICD-10-CM

## 2021-01-01 DIAGNOSIS — E08.40 DIABETES MELLITUS DUE TO UNDERLYING CONDITION WITH DIABETIC NEUROPATHY, UNSPECIFIED WHETHER LONG TERM INSULIN USE (H): ICD-10-CM

## 2021-01-01 DIAGNOSIS — I71.40 ABDOMINAL AORTIC ANEURYSM, WITHOUT RUPTURE: ICD-10-CM

## 2021-01-01 DIAGNOSIS — C25.9 PANCREATIC ADENOCARCINOMA (H): Primary | ICD-10-CM

## 2021-01-01 DIAGNOSIS — Z71.89 GOALS OF CARE, COUNSELING/DISCUSSION: ICD-10-CM

## 2021-01-01 DIAGNOSIS — U07.1 COVID-19: ICD-10-CM

## 2021-01-01 DIAGNOSIS — Z11.59 ENCOUNTER FOR SCREENING FOR OTHER VIRAL DISEASES: ICD-10-CM

## 2021-01-01 DIAGNOSIS — K59.03 THERAPEUTIC OPIOID INDUCED CONSTIPATION: ICD-10-CM

## 2021-01-01 DIAGNOSIS — C25.9 MALIGNANT NEOPLASM OF PANCREAS, UNSPECIFIED LOCATION OF MALIGNANCY (H): Primary | ICD-10-CM

## 2021-01-01 DIAGNOSIS — C25.9 PANCREATIC ADENOCARCINOMA (H): ICD-10-CM

## 2021-01-01 DIAGNOSIS — C25.9 MALIGNANT NEOPLASM OF PANCREAS, UNSPECIFIED (H): ICD-10-CM

## 2021-01-01 DIAGNOSIS — K83.1 BILIARY OBSTRUCTION (H): ICD-10-CM

## 2021-01-01 DIAGNOSIS — Z71.89 ADVANCED CARE PLANNING/COUNSELING DISCUSSION: ICD-10-CM

## 2021-01-01 DIAGNOSIS — E11.9 TYPE 2 DIABETES MELLITUS WITHOUT COMPLICATION, WITHOUT LONG-TERM CURRENT USE OF INSULIN (H): ICD-10-CM

## 2021-01-01 DIAGNOSIS — N40.1 BENIGN PROSTATIC HYPERPLASIA WITH URINARY FREQUENCY: ICD-10-CM

## 2021-01-01 DIAGNOSIS — I71.40 ANEURYSM OF ABDOMINAL VESSEL (H): ICD-10-CM

## 2021-01-01 DIAGNOSIS — R35.0 BENIGN PROSTATIC HYPERPLASIA WITH URINARY FREQUENCY: ICD-10-CM

## 2021-01-01 DIAGNOSIS — G47.33 OSA (OBSTRUCTIVE SLEEP APNEA): Primary | ICD-10-CM

## 2021-01-01 DIAGNOSIS — R42 LIGHTHEADEDNESS: ICD-10-CM

## 2021-01-01 DIAGNOSIS — Z51.5 ENCOUNTER FOR PALLIATIVE CARE: ICD-10-CM

## 2021-01-01 LAB
ALBUMIN SERPL-MCNC: 2.5 G/DL (ref 3.5–5)
ALBUMIN SERPL-MCNC: 2.6 G/DL (ref 3.5–5)
ALBUMIN SERPL-MCNC: 2.8 G/DL (ref 3.5–5)
ALBUMIN SERPL-MCNC: 2.8 G/DL (ref 3.5–5)
ALBUMIN SERPL-MCNC: 2.9 G/DL (ref 3.4–5)
ALBUMIN SERPL-MCNC: 3 G/DL (ref 3.5–5)
ALBUMIN SERPL-MCNC: 3.7 G/DL (ref 3.5–5)
ALBUMIN UR-MCNC: NEGATIVE MG/DL
ALP SERPL-CCNC: 220 U/L (ref 45–120)
ALP SERPL-CCNC: 273 U/L (ref 40–150)
ALP SERPL-CCNC: 421 U/L (ref 45–120)
ALP SERPL-CCNC: 436 U/L (ref 45–120)
ALP SERPL-CCNC: 543 U/L (ref 45–120)
ALP SERPL-CCNC: 598 U/L (ref 45–120)
ALP SERPL-CCNC: 84 U/L (ref 45–120)
ALT SERPL W P-5'-P-CCNC: 107 U/L (ref 0–45)
ALT SERPL W P-5'-P-CCNC: 12 U/L (ref 0–45)
ALT SERPL W P-5'-P-CCNC: 135 U/L (ref 0–45)
ALT SERPL W P-5'-P-CCNC: 160 U/L (ref 0–45)
ALT SERPL W P-5'-P-CCNC: 49 U/L (ref 0–45)
ALT SERPL W P-5'-P-CCNC: 71 U/L (ref 0–70)
ALT SERPL W P-5'-P-CCNC: 88 U/L (ref 0–45)
AMMONIA PLAS-SCNC: 36 UMOL/L (ref 11–35)
ANION GAP SERPL CALCULATED.3IONS-SCNC: 10 MMOL/L (ref 5–18)
ANION GAP SERPL CALCULATED.3IONS-SCNC: 12 MMOL/L (ref 5–18)
ANION GAP SERPL CALCULATED.3IONS-SCNC: 14 MMOL/L (ref 5–18)
ANION GAP SERPL CALCULATED.3IONS-SCNC: 17 MMOL/L (ref 5–18)
ANION GAP SERPL CALCULATED.3IONS-SCNC: 5 MMOL/L (ref 3–14)
ANION GAP SERPL CALCULATED.3IONS-SCNC: 8 MMOL/L (ref 5–18)
ANION GAP SERPL CALCULATED.3IONS-SCNC: 9 MMOL/L (ref 5–18)
ANION GAP SERPL CALCULATED.3IONS-SCNC: 9 MMOL/L (ref 5–18)
APPEARANCE UR: CLEAR
APTT PPP: 30 SECONDS (ref 22–38)
AST SERPL W P-5'-P-CCNC: 105 U/L (ref 0–40)
AST SERPL W P-5'-P-CCNC: 13 U/L (ref 0–40)
AST SERPL W P-5'-P-CCNC: 32 U/L (ref 0–40)
AST SERPL W P-5'-P-CCNC: 40 U/L (ref 0–40)
AST SERPL W P-5'-P-CCNC: 44 U/L (ref 0–45)
AST SERPL W P-5'-P-CCNC: 54 U/L (ref 0–40)
AST SERPL W P-5'-P-CCNC: 95 U/L (ref 0–40)
ATRIAL RATE - MUSE: 60 BPM
BASOPHILS # BLD AUTO: 0.1 10E3/UL (ref 0–0.2)
BASOPHILS # BLD AUTO: 0.1 10E3/UL (ref 0–0.2)
BASOPHILS NFR BLD AUTO: 1 %
BASOPHILS NFR BLD AUTO: 1 %
BILIRUB DIRECT SERPL-MCNC: 16.4 MG/DL
BILIRUB DIRECT SERPL-MCNC: 2.6 MG/DL
BILIRUB SERPL-MCNC: 0.4 MG/DL (ref 0–1)
BILIRUB SERPL-MCNC: 12.9 MG/DL (ref 0–1)
BILIRUB SERPL-MCNC: 25.1 MG/DL (ref 0–1)
BILIRUB SERPL-MCNC: 26.1 MG/DL (ref 0–1)
BILIRUB SERPL-MCNC: 4.2 MG/DL (ref 0.2–1.3)
BILIRUB SERPL-MCNC: 4.3 MG/DL (ref 0–1)
BILIRUB SERPL-MCNC: 8.9 MG/DL (ref 0–1)
BILIRUB UR QL STRIP: NEGATIVE
BUN SERPL-MCNC: 11 MG/DL (ref 8–28)
BUN SERPL-MCNC: 12 MG/DL (ref 7–30)
BUN SERPL-MCNC: 13 MG/DL (ref 8–28)
BUN SERPL-MCNC: 14 MG/DL (ref 8–28)
BUN SERPL-MCNC: 9 MG/DL (ref 8–28)
CALCIUM SERPL-MCNC: 8.9 MG/DL (ref 8.5–10.5)
CALCIUM SERPL-MCNC: 9 MG/DL (ref 8.5–10.1)
CALCIUM SERPL-MCNC: 9.2 MG/DL (ref 8.5–10.5)
CALCIUM SERPL-MCNC: 9.2 MG/DL (ref 8.5–10.5)
CALCIUM SERPL-MCNC: 9.3 MG/DL (ref 8.5–10.5)
CALCIUM SERPL-MCNC: 9.5 MG/DL (ref 8.5–10.5)
CALCIUM SERPL-MCNC: 9.6 MG/DL (ref 8.5–10.5)
CALCIUM SERPL-MCNC: 9.8 MG/DL (ref 8.5–10.5)
CANCER AG19-9 SERPL IA-ACNC: 2654 U/ML
CHLORIDE BLD-SCNC: 100 MMOL/L (ref 98–107)
CHLORIDE BLD-SCNC: 101 MMOL/L (ref 98–107)
CHLORIDE BLD-SCNC: 102 MMOL/L (ref 94–109)
CHLORIDE BLD-SCNC: 103 MMOL/L (ref 98–107)
CHLORIDE BLD-SCNC: 95 MMOL/L (ref 98–107)
CHLORIDE BLD-SCNC: 98 MMOL/L (ref 98–107)
CO2 SERPL-SCNC: 21 MMOL/L (ref 22–31)
CO2 SERPL-SCNC: 22 MMOL/L (ref 22–31)
CO2 SERPL-SCNC: 23 MMOL/L (ref 22–31)
CO2 SERPL-SCNC: 24 MMOL/L (ref 22–31)
CO2 SERPL-SCNC: 24 MMOL/L (ref 22–31)
CO2 SERPL-SCNC: 28 MMOL/L (ref 20–32)
CO2 SERPL-SCNC: 28 MMOL/L (ref 22–31)
CO2 SERPL-SCNC: 30 MMOL/L (ref 22–31)
COLOR UR AUTO: YELLOW
CREAT SERPL-MCNC: 0.83 MG/DL (ref 0.7–1.3)
CREAT SERPL-MCNC: 0.98 MG/DL (ref 0.7–1.3)
CREAT SERPL-MCNC: 1.01 MG/DL (ref 0.66–1.25)
CREAT SERPL-MCNC: 1.03 MG/DL (ref 0.7–1.3)
CREAT SERPL-MCNC: 1.06 MG/DL (ref 0.7–1.3)
CREAT SERPL-MCNC: 1.11 MG/DL (ref 0.7–1.3)
CREAT SERPL-MCNC: 1.23 MG/DL (ref 0.7–1.3)
CREAT SERPL-MCNC: 1.29 MG/DL (ref 0.7–1.3)
DIASTOLIC BLOOD PRESSURE - MUSE: 71 MMHG
EOSINOPHIL # BLD AUTO: 0.1 10E3/UL (ref 0–0.7)
EOSINOPHIL # BLD AUTO: 0.3 10E3/UL (ref 0–0.7)
EOSINOPHIL NFR BLD AUTO: 2 %
EOSINOPHIL NFR BLD AUTO: 4 %
ERCP: NORMAL
ERYTHROCYTE [DISTWIDTH] IN BLOOD BY AUTOMATED COUNT: 13.9 % (ref 10–15)
ERYTHROCYTE [DISTWIDTH] IN BLOOD BY AUTOMATED COUNT: 14.1 % (ref 10–15)
ERYTHROCYTE [DISTWIDTH] IN BLOOD BY AUTOMATED COUNT: 15.3 % (ref 10–15)
GFR SERPL CREATININE-BSD FRML MDRD: 55 ML/MIN/1.73M2
GFR SERPL CREATININE-BSD FRML MDRD: 58 ML/MIN/1.73M2
GFR SERPL CREATININE-BSD FRML MDRD: 66 ML/MIN/1.73M2
GFR SERPL CREATININE-BSD FRML MDRD: 72 ML/MIN/1.73M2
GFR SERPL CREATININE-BSD FRML MDRD: 74 ML/MIN/1.73M2
GFR SERPL CREATININE-BSD FRML MDRD: 77 ML/MIN/1.73M2
GFR SERPL CREATININE-BSD FRML MDRD: 88 ML/MIN/1.73M2
GFR SERPL CREATININE-BSD FRML MDRD: >60 ML/MIN/1.73M2
GLUCOSE BLD-MCNC: 128 MG/DL (ref 70–125)
GLUCOSE BLD-MCNC: 146 MG/DL (ref 70–125)
GLUCOSE BLD-MCNC: 152 MG/DL (ref 70–125)
GLUCOSE BLD-MCNC: 152 MG/DL (ref 70–125)
GLUCOSE BLD-MCNC: 162 MG/DL (ref 70–99)
GLUCOSE BLD-MCNC: 174 MG/DL (ref 70–125)
GLUCOSE BLD-MCNC: 279 MG/DL (ref 70–125)
GLUCOSE BLD-MCNC: 93 MG/DL (ref 70–125)
GLUCOSE BLDC GLUCOMTR-MCNC: 113 MG/DL (ref 70–99)
GLUCOSE BLDC GLUCOMTR-MCNC: 119 MG/DL (ref 70–99)
GLUCOSE BLDC GLUCOMTR-MCNC: 121 MG/DL (ref 70–99)
GLUCOSE BLDC GLUCOMTR-MCNC: 123 MG/DL (ref 70–99)
GLUCOSE BLDC GLUCOMTR-MCNC: 129 MG/DL (ref 70–99)
GLUCOSE BLDC GLUCOMTR-MCNC: 130 MG/DL (ref 70–99)
GLUCOSE BLDC GLUCOMTR-MCNC: 133 MG/DL (ref 70–99)
GLUCOSE BLDC GLUCOMTR-MCNC: 139 MG/DL (ref 70–99)
GLUCOSE BLDC GLUCOMTR-MCNC: 139 MG/DL (ref 70–99)
GLUCOSE BLDC GLUCOMTR-MCNC: 141 MG/DL (ref 70–99)
GLUCOSE BLDC GLUCOMTR-MCNC: 147 MG/DL (ref 70–99)
GLUCOSE BLDC GLUCOMTR-MCNC: 163 MG/DL (ref 70–99)
GLUCOSE BLDC GLUCOMTR-MCNC: 199 MG/DL (ref 70–99)
GLUCOSE BLDC GLUCOMTR-MCNC: 218 MG/DL (ref 70–99)
GLUCOSE UR STRIP-MCNC: 200 MG/DL
HBA1C MFR BLD: 5.4 %
HBA1C MFR BLD: 6 % (ref 0–5.6)
HCT VFR BLD AUTO: 32.7 % (ref 40–53)
HCT VFR BLD AUTO: 34.5 % (ref 40–53)
HCT VFR BLD AUTO: 35.7 % (ref 40–53)
HGB BLD-MCNC: 10.3 G/DL (ref 13.3–17.7)
HGB BLD-MCNC: 11 G/DL (ref 13.3–17.7)
HGB BLD-MCNC: 12.4 G/DL (ref 13.3–17.7)
HGB UR QL STRIP: NEGATIVE
HOLD SPECIMEN: NORMAL
HYALINE CASTS: 3 /LPF
IMM GRANULOCYTES # BLD: 0.1 10E3/UL
IMM GRANULOCYTES # BLD: 0.1 10E3/UL
IMM GRANULOCYTES NFR BLD: 1 %
IMM GRANULOCYTES NFR BLD: 1 %
INR PPP: 1 (ref 0.85–1.15)
INR PPP: 1.01 (ref 0.85–1.15)
INR PPP: 1.13 (ref 0.85–1.15)
INTERPRETATION ECG - MUSE: NORMAL
KETONES UR STRIP-MCNC: NEGATIVE MG/DL
LEUKOCYTE ESTERASE UR QL STRIP: NEGATIVE
LIPASE SERPL-CCNC: 153 U/L (ref 0–52)
LIPASE SERPL-CCNC: 87 U/L (ref 0–52)
LYMPHOCYTES # BLD AUTO: 0.9 10E3/UL (ref 0.8–5.3)
LYMPHOCYTES # BLD AUTO: 1.2 10E3/UL (ref 0.8–5.3)
LYMPHOCYTES NFR BLD AUTO: 13 %
LYMPHOCYTES NFR BLD AUTO: 17 %
MAGNESIUM SERPL-MCNC: 2 MG/DL (ref 1.8–2.6)
MAGNESIUM SERPL-MCNC: 2.2 MG/DL (ref 1.6–2.3)
MCH RBC QN AUTO: 31.2 PG (ref 26.5–33)
MCH RBC QN AUTO: 31.3 PG (ref 26.5–33)
MCH RBC QN AUTO: 31.5 PG (ref 26.5–33)
MCHC RBC AUTO-ENTMCNC: 31.5 G/DL (ref 31.5–36.5)
MCHC RBC AUTO-ENTMCNC: 31.9 G/DL (ref 31.5–36.5)
MCHC RBC AUTO-ENTMCNC: 34.7 G/DL (ref 31.5–36.5)
MCV RBC AUTO: 90 FL (ref 78–100)
MCV RBC AUTO: 99 FL (ref 78–100)
MCV RBC AUTO: 99 FL (ref 78–100)
MONOCYTES # BLD AUTO: 0.5 10E3/UL (ref 0–1.3)
MONOCYTES # BLD AUTO: 0.5 10E3/UL (ref 0–1.3)
MONOCYTES NFR BLD AUTO: 7 %
MONOCYTES NFR BLD AUTO: 8 %
MUCOUS THREADS #/AREA URNS LPF: PRESENT /LPF
NEUTROPHILS # BLD AUTO: 4.9 10E3/UL (ref 1.6–8.3)
NEUTROPHILS # BLD AUTO: 5 10E3/UL (ref 1.6–8.3)
NEUTROPHILS NFR BLD AUTO: 70 %
NEUTROPHILS NFR BLD AUTO: 75 %
NITRATE UR QL: NEGATIVE
NRBC # BLD AUTO: 0 10E3/UL
NRBC # BLD AUTO: 0 10E3/UL
NRBC BLD AUTO-RTO: 0 /100
NRBC BLD AUTO-RTO: 0 /100
P AXIS - MUSE: -5 DEGREES
PATH REPORT.ADDENDUM SPEC: ABNORMAL
PATH REPORT.COMMENTS IMP SPEC: ABNORMAL
PATH REPORT.COMMENTS IMP SPEC: YES
PATH REPORT.COMMENTS IMP SPEC: YES
PATH REPORT.FINAL DX SPEC: ABNORMAL
PATH REPORT.GROSS SPEC: ABNORMAL
PATH REPORT.MICROSCOPIC SPEC OTHER STN: ABNORMAL
PH UR STRIP: 5.5 [PH] (ref 5–7)
PHOSPHATE SERPL-MCNC: 3.3 MG/DL (ref 2.5–4.5)
PLATELET # BLD AUTO: 171 10E3/UL (ref 150–450)
PLATELET # BLD AUTO: 223 10E3/UL (ref 150–450)
PLATELET # BLD AUTO: 233 10E3/UL (ref 150–450)
POTASSIUM BLD-SCNC: 3.6 MMOL/L (ref 3.5–5)
POTASSIUM BLD-SCNC: 3.8 MMOL/L (ref 3.4–5.3)
POTASSIUM BLD-SCNC: 4 MMOL/L (ref 3.5–5)
POTASSIUM BLD-SCNC: 4.1 MMOL/L (ref 3.5–5)
POTASSIUM BLD-SCNC: 4.1 MMOL/L (ref 3.5–5)
POTASSIUM BLD-SCNC: 4.2 MMOL/L (ref 3.5–5)
POTASSIUM BLD-SCNC: 4.3 MMOL/L (ref 3.5–5)
POTASSIUM BLD-SCNC: 4.5 MMOL/L (ref 3.5–5)
PR INTERVAL - MUSE: 216 MS
PROT SERPL-MCNC: 5.9 G/DL (ref 6–8)
PROT SERPL-MCNC: 6.2 G/DL (ref 6–8)
PROT SERPL-MCNC: 6.5 G/DL (ref 6–8)
PROT SERPL-MCNC: 6.8 G/DL (ref 6–8)
PROT SERPL-MCNC: 7.2 G/DL (ref 6.8–8.8)
QRS DURATION - MUSE: 96 MS
QT - MUSE: 422 MS
QTC - MUSE: 422 MS
R AXIS - MUSE: -32 DEGREES
RBC # BLD AUTO: 3.29 10E6/UL (ref 4.4–5.9)
RBC # BLD AUTO: 3.49 10E6/UL (ref 4.4–5.9)
RBC # BLD AUTO: 3.98 10E6/UL (ref 4.4–5.9)
RBC URINE: <1 /HPF
SARS-COV-2 RNA RESP QL NAA+PROBE: NEGATIVE
SARS-COV-2 RNA RESP QL NAA+PROBE: POSITIVE
SHBG SERPL-SCNC: 39 NMOL/L (ref 11–80)
SODIUM SERPL-SCNC: 131 MMOL/L (ref 136–145)
SODIUM SERPL-SCNC: 135 MMOL/L (ref 133–144)
SODIUM SERPL-SCNC: 135 MMOL/L (ref 136–145)
SODIUM SERPL-SCNC: 136 MMOL/L (ref 136–145)
SODIUM SERPL-SCNC: 140 MMOL/L (ref 136–145)
SODIUM SERPL-SCNC: 142 MMOL/L (ref 136–145)
SP GR UR STRIP: 1.01 (ref 1–1.03)
SPECIMEN STATUS: NORMAL
SYSTOLIC BLOOD PRESSURE - MUSE: 126 MMHG
T AXIS - MUSE: 50 DEGREES
TESTOST FREE SERPL-MCNC: 4.56 NG/DL (ref 4.7–24.4)
TESTOST SERPL-MCNC: 261 NG/DL (ref 240–950)
TROPONIN I SERPL-MCNC: <0.01 NG/ML (ref 0–0.29)
TSH SERPL DL<=0.005 MIU/L-ACNC: 0.81 UIU/ML (ref 0.3–5)
TSH SERPL DL<=0.005 MIU/L-ACNC: 1.38 UIU/ML (ref 0.3–5)
UPPER EUS: NORMAL
UROBILINOGEN UR STRIP-MCNC: <2 MG/DL
VENTRICULAR RATE- MUSE: 60 BPM
WBC # BLD AUTO: 6.1 10E3/UL (ref 4–11)
WBC # BLD AUTO: 6.7 10E3/UL (ref 4–11)
WBC # BLD AUTO: 7 10E3/UL (ref 4–11)
WBC URINE: <1 /HPF

## 2021-01-01 PROCEDURE — 88305 TISSUE EXAM BY PATHOLOGIST: CPT | Mod: 26 | Performed by: PATHOLOGY

## 2021-01-01 PROCEDURE — 250N000025 HC SEVOFLURANE, PER MIN: Performed by: INTERNAL MEDICINE

## 2021-01-01 PROCEDURE — 258N000003 HC RX IP 258 OP 636: Performed by: NURSE ANESTHETIST, CERTIFIED REGISTERED

## 2021-01-01 PROCEDURE — 80053 COMPREHEN METABOLIC PANEL: CPT | Performed by: EMERGENCY MEDICINE

## 2021-01-01 PROCEDURE — 96360 HYDRATION IV INFUSION INIT: CPT | Mod: 59

## 2021-01-01 PROCEDURE — 96374 THER/PROPH/DIAG INJ IV PUSH: CPT

## 2021-01-01 PROCEDURE — 250N000011 HC RX IP 250 OP 636: Performed by: NURSE ANESTHETIST, CERTIFIED REGISTERED

## 2021-01-01 PROCEDURE — 99239 HOSP IP/OBS DSCHRG MGMT >30: CPT | Performed by: INTERNAL MEDICINE

## 2021-01-01 PROCEDURE — 96374 THER/PROPH/DIAG INJ IV PUSH: CPT | Mod: 59

## 2021-01-01 PROCEDURE — 99285 EMERGENCY DEPT VISIT HI MDM: CPT | Mod: 25

## 2021-01-01 PROCEDURE — 85730 THROMBOPLASTIN TIME PARTIAL: CPT | Performed by: EMERGENCY MEDICINE

## 2021-01-01 PROCEDURE — 96361 HYDRATE IV INFUSION ADD-ON: CPT

## 2021-01-01 PROCEDURE — 99220 PR INITIAL OBSERVATION CARE,LEVEL III: CPT | Performed by: INTERNAL MEDICINE

## 2021-01-01 PROCEDURE — 36415 COLL VENOUS BLD VENIPUNCTURE: CPT

## 2021-01-01 PROCEDURE — 81001 URINALYSIS AUTO W/SCOPE: CPT | Performed by: EMERGENCY MEDICINE

## 2021-01-01 PROCEDURE — C1874 STENT, COATED/COV W/DEL SYS: HCPCS | Performed by: INTERNAL MEDICINE

## 2021-01-01 PROCEDURE — 258N000003 HC RX IP 258 OP 636: Performed by: EMERGENCY MEDICINE

## 2021-01-01 PROCEDURE — 99232 SBSQ HOSP IP/OBS MODERATE 35: CPT | Performed by: HOSPITALIST

## 2021-01-01 PROCEDURE — 83735 ASSAY OF MAGNESIUM: CPT

## 2021-01-01 PROCEDURE — 120N000001 HC R&B MED SURG/OB

## 2021-01-01 PROCEDURE — U0003 INFECTIOUS AGENT DETECTION BY NUCLEIC ACID (DNA OR RNA); SEVERE ACUTE RESPIRATORY SYNDROME CORONAVIRUS 2 (SARS-COV-2) (CORONAVIRUS DISEASE [COVID-19]), AMPLIFIED PROBE TECHNIQUE, MAKING USE OF HIGH THROUGHPUT TECHNOLOGIES AS DESCRIBED BY CMS-2020-01-R: HCPCS

## 2021-01-01 PROCEDURE — 80053 COMPREHEN METABOLIC PANEL: CPT | Mod: ORL | Performed by: FAMILY MEDICINE

## 2021-01-01 PROCEDURE — 255N000002 HC RX 255 OP 636: Performed by: EMERGENCY MEDICINE

## 2021-01-01 PROCEDURE — 88173 CYTOPATH EVAL FNA REPORT: CPT | Mod: 26 | Performed by: PATHOLOGY

## 2021-01-01 PROCEDURE — 36415 COLL VENOUS BLD VENIPUNCTURE: CPT | Performed by: EMERGENCY MEDICINE

## 2021-01-01 PROCEDURE — 99497 ADVNCD CARE PLAN 30 MIN: CPT | Mod: GT | Performed by: STUDENT IN AN ORGANIZED HEALTH CARE EDUCATION/TRAINING PROGRAM

## 2021-01-01 PROCEDURE — 99214 OFFICE O/P EST MOD 30 MIN: CPT | Mod: 95 | Performed by: STUDENT IN AN ORGANIZED HEALTH CARE EDUCATION/TRAINING PROGRAM

## 2021-01-01 PROCEDURE — 82040 ASSAY OF SERUM ALBUMIN: CPT | Performed by: HOSPITALIST

## 2021-01-01 PROCEDURE — 999N001193 HC VIDEO/TELEPHONE VISIT; NO CHARGE

## 2021-01-01 PROCEDURE — 250N000011 HC RX IP 250 OP 636: Performed by: HOSPITALIST

## 2021-01-01 PROCEDURE — 93005 ELECTROCARDIOGRAM TRACING: CPT

## 2021-01-01 PROCEDURE — 88173 CYTOPATH EVAL FNA REPORT: CPT | Mod: TC | Performed by: INTERNAL MEDICINE

## 2021-01-01 PROCEDURE — 83735 ASSAY OF MAGNESIUM: CPT | Performed by: EMERGENCY MEDICINE

## 2021-01-01 PROCEDURE — 999N000141 HC STATISTIC PRE-PROCEDURE NURSING ASSESSMENT: Performed by: INTERNAL MEDICINE

## 2021-01-01 PROCEDURE — G0378 HOSPITAL OBSERVATION PER HR: HCPCS

## 2021-01-01 PROCEDURE — 71046 X-RAY EXAM CHEST 2 VIEWS: CPT

## 2021-01-01 PROCEDURE — 85610 PROTHROMBIN TIME: CPT | Performed by: EMERGENCY MEDICINE

## 2021-01-01 PROCEDURE — 360N000083 HC SURGERY LEVEL 3 W/ FLUORO, PER MIN: Performed by: INTERNAL MEDICINE

## 2021-01-01 PROCEDURE — 250N000013 HC RX MED GY IP 250 OP 250 PS 637: Performed by: HOSPITALIST

## 2021-01-01 PROCEDURE — 250N000011 HC RX IP 250 OP 636: Performed by: INTERNAL MEDICINE

## 2021-01-01 PROCEDURE — U0005 INFEC AGEN DETEC AMPLI PROBE: HCPCS | Mod: ORL | Performed by: FAMILY MEDICINE

## 2021-01-01 PROCEDURE — 86301 IMMUNOASSAY TUMOR CA 19-9: CPT

## 2021-01-01 PROCEDURE — 94660 CPAP INITIATION&MGMT: CPT

## 2021-01-01 PROCEDURE — 80048 BASIC METABOLIC PNL TOTAL CA: CPT | Performed by: EMERGENCY MEDICINE

## 2021-01-01 PROCEDURE — 999N000180 XR SURGERY CARM FLUORO LESS THAN 5 MIN

## 2021-01-01 PROCEDURE — 84443 ASSAY THYROID STIM HORMONE: CPT | Performed by: EMERGENCY MEDICINE

## 2021-01-01 PROCEDURE — 82040 ASSAY OF SERUM ALBUMIN: CPT | Performed by: EMERGENCY MEDICINE

## 2021-01-01 PROCEDURE — 258N000003 HC RX IP 258 OP 636: Performed by: ANESTHESIOLOGY

## 2021-01-01 PROCEDURE — 250N000009 HC RX 250: Performed by: NURSE ANESTHETIST, CERTIFIED REGISTERED

## 2021-01-01 PROCEDURE — 83036 HEMOGLOBIN GLYCOSYLATED A1C: CPT

## 2021-01-01 PROCEDURE — 82140 ASSAY OF AMMONIA: CPT | Performed by: EMERGENCY MEDICINE

## 2021-01-01 PROCEDURE — 71260 CT THORAX DX C+: CPT

## 2021-01-01 PROCEDURE — C9803 HOPD COVID-19 SPEC COLLECT: HCPCS

## 2021-01-01 PROCEDURE — 258N000003 HC RX IP 258 OP 636: Performed by: INTERNAL MEDICINE

## 2021-01-01 PROCEDURE — G0463 HOSPITAL OUTPT CLINIC VISIT: HCPCS

## 2021-01-01 PROCEDURE — 36415 COLL VENOUS BLD VENIPUNCTURE: CPT | Performed by: INTERNAL MEDICINE

## 2021-01-01 PROCEDURE — 250N000013 HC RX MED GY IP 250 OP 250 PS 637: Performed by: EMERGENCY MEDICINE

## 2021-01-01 PROCEDURE — 83690 ASSAY OF LIPASE: CPT | Performed by: EMERGENCY MEDICINE

## 2021-01-01 PROCEDURE — A9585 GADOBUTROL INJECTION: HCPCS | Performed by: EMERGENCY MEDICINE

## 2021-01-01 PROCEDURE — C1769 GUIDE WIRE: HCPCS | Performed by: INTERNAL MEDICINE

## 2021-01-01 PROCEDURE — 83036 HEMOGLOBIN GLYCOSYLATED A1C: CPT | Performed by: INTERNAL MEDICINE

## 2021-01-01 PROCEDURE — 250N000011 HC RX IP 250 OP 636: Performed by: EMERGENCY MEDICINE

## 2021-01-01 PROCEDURE — 85610 PROTHROMBIN TIME: CPT | Performed by: PHYSICIAN ASSISTANT

## 2021-01-01 PROCEDURE — 84100 ASSAY OF PHOSPHORUS: CPT

## 2021-01-01 PROCEDURE — 250N000011 HC RX IP 250 OP 636: Performed by: STUDENT IN AN ORGANIZED HEALTH CARE EDUCATION/TRAINING PROGRAM

## 2021-01-01 PROCEDURE — 85025 COMPLETE CBC W/AUTO DIFF WBC: CPT

## 2021-01-01 PROCEDURE — 36415 COLL VENOUS BLD VENIPUNCTURE: CPT | Performed by: HOSPITALIST

## 2021-01-01 PROCEDURE — 0F798DZ DILATION OF COMMON BILE DUCT WITH INTRALUMINAL DEVICE, VIA NATURAL OR ARTIFICIAL OPENING ENDOSCOPIC: ICD-10-PCS | Performed by: INTERNAL MEDICINE

## 2021-01-01 PROCEDURE — 85027 COMPLETE CBC AUTOMATED: CPT | Performed by: EMERGENCY MEDICINE

## 2021-01-01 PROCEDURE — 84484 ASSAY OF TROPONIN QUANT: CPT | Performed by: EMERGENCY MEDICINE

## 2021-01-01 PROCEDURE — 85610 PROTHROMBIN TIME: CPT

## 2021-01-01 PROCEDURE — 99204 OFFICE O/P NEW MOD 45 MIN: CPT | Mod: GT | Performed by: STUDENT IN AN ORGANIZED HEALTH CARE EDUCATION/TRAINING PROGRAM

## 2021-01-01 PROCEDURE — 74177 CT ABD & PELVIS W/CONTRAST: CPT

## 2021-01-01 PROCEDURE — 93005 ELECTROCARDIOGRAM TRACING: CPT | Performed by: EMERGENCY MEDICINE

## 2021-01-01 PROCEDURE — 250N000013 HC RX MED GY IP 250 OP 250 PS 637: Performed by: INTERNAL MEDICINE

## 2021-01-01 PROCEDURE — 99205 OFFICE O/P NEW HI 60 MIN: CPT | Performed by: STUDENT IN AN ORGANIZED HEALTH CARE EDUCATION/TRAINING PROGRAM

## 2021-01-01 PROCEDURE — 0FBG3ZX EXCISION OF PANCREAS, PERCUTANEOUS APPROACH, DIAGNOSTIC: ICD-10-PCS | Performed by: INTERNAL MEDICINE

## 2021-01-01 PROCEDURE — 250N000011 HC RX IP 250 OP 636: Performed by: ANESTHESIOLOGY

## 2021-01-01 PROCEDURE — 250N000012 HC RX MED GY IP 250 OP 636 PS 637: Performed by: INTERNAL MEDICINE

## 2021-01-01 PROCEDURE — 96361 HYDRATE IV INFUSION ADD-ON: CPT | Mod: XU

## 2021-01-01 PROCEDURE — 70553 MRI BRAIN STEM W/O & W/DYE: CPT

## 2021-01-01 PROCEDURE — 710N000009 HC RECOVERY PHASE 1, LEVEL 1, PER MIN: Performed by: INTERNAL MEDICINE

## 2021-01-01 PROCEDURE — 85025 COMPLETE CBC W/AUTO DIFF WBC: CPT | Performed by: EMERGENCY MEDICINE

## 2021-01-01 PROCEDURE — 99203 OFFICE O/P NEW LOW 30 MIN: CPT | Mod: 95 | Performed by: INTERNAL MEDICINE

## 2021-01-01 PROCEDURE — 88305 TISSUE EXAM BY PATHOLOGIST: CPT | Mod: TC | Performed by: INTERNAL MEDICINE

## 2021-01-01 PROCEDURE — 96376 TX/PRO/DX INJ SAME DRUG ADON: CPT

## 2021-01-01 PROCEDURE — 80053 COMPREHEN METABOLIC PANEL: CPT

## 2021-01-01 PROCEDURE — 255N000002 HC RX 255 OP 636: Performed by: INTERNAL MEDICINE

## 2021-01-01 PROCEDURE — 82040 ASSAY OF SERUM ALBUMIN: CPT | Performed by: FAMILY MEDICINE

## 2021-01-01 PROCEDURE — U0005 INFEC AGEN DETEC AMPLI PROBE: HCPCS | Mod: ORL | Performed by: NURSE PRACTITIONER

## 2021-01-01 PROCEDURE — 87635 SARS-COV-2 COVID-19 AMP PRB: CPT | Performed by: EMERGENCY MEDICINE

## 2021-01-01 PROCEDURE — 370N000017 HC ANESTHESIA TECHNICAL FEE, PER MIN: Performed by: INTERNAL MEDICINE

## 2021-01-01 PROCEDURE — 99215 OFFICE O/P EST HI 40 MIN: CPT | Performed by: STUDENT IN AN ORGANIZED HEALTH CARE EDUCATION/TRAINING PROGRAM

## 2021-01-01 PROCEDURE — 82040 ASSAY OF SERUM ALBUMIN: CPT | Performed by: INTERNAL MEDICINE

## 2021-01-01 PROCEDURE — 36415 COLL VENOUS BLD VENIPUNCTURE: CPT | Performed by: PHYSICIAN ASSISTANT

## 2021-01-01 PROCEDURE — 272N000001 HC OR GENERAL SUPPLY STERILE: Performed by: INTERNAL MEDICINE

## 2021-01-01 DEVICE — STENT SYSTEM RMV
Type: IMPLANTABLE DEVICE | Site: ESOPHAGUS | Status: FUNCTIONAL
Brand: WALLFLEX BILIARY

## 2021-01-01 RX ORDER — FENTANYL CITRATE 50 UG/ML
INJECTION, SOLUTION INTRAMUSCULAR; INTRAVENOUS PRN
Status: DISCONTINUED | OUTPATIENT
Start: 2021-01-01 | End: 2021-01-01

## 2021-01-01 RX ORDER — ONDANSETRON 2 MG/ML
4 INJECTION INTRAMUSCULAR; INTRAVENOUS EVERY 30 MIN PRN
Status: DISCONTINUED | OUTPATIENT
Start: 2021-01-01 | End: 2021-01-01 | Stop reason: HOSPADM

## 2021-01-01 RX ORDER — IBUPROFEN 200 MG
600 TABLET ORAL
Status: ON HOLD | COMMUNITY
End: 2021-01-01

## 2021-01-01 RX ORDER — MEPERIDINE HYDROCHLORIDE 25 MG/ML
25 INJECTION INTRAMUSCULAR; INTRAVENOUS; SUBCUTANEOUS EVERY 30 MIN PRN
Status: CANCELLED | OUTPATIENT
Start: 2021-01-01

## 2021-01-01 RX ORDER — FLUMAZENIL 0.1 MG/ML
0.2 INJECTION, SOLUTION INTRAVENOUS
Status: ACTIVE | OUTPATIENT
Start: 2021-01-01 | End: 2021-01-01

## 2021-01-01 RX ORDER — SIMVASTATIN 10 MG
40 TABLET ORAL AT BEDTIME
Status: DISCONTINUED | OUTPATIENT
Start: 2021-01-01 | End: 2021-01-01 | Stop reason: HOSPADM

## 2021-01-01 RX ORDER — ONDANSETRON 4 MG/1
4 TABLET, ORALLY DISINTEGRATING ORAL EVERY 30 MIN PRN
Status: DISCONTINUED | OUTPATIENT
Start: 2021-01-01 | End: 2021-01-01 | Stop reason: HOSPADM

## 2021-01-01 RX ORDER — HYDROMORPHONE HCL IN WATER/PF 6 MG/30 ML
0.2 PATIENT CONTROLLED ANALGESIA SYRINGE INTRAVENOUS
Status: DISCONTINUED | OUTPATIENT
Start: 2021-01-01 | End: 2021-01-01

## 2021-01-01 RX ORDER — HYDROMORPHONE HYDROCHLORIDE 1 MG/ML
0.5 INJECTION, SOLUTION INTRAMUSCULAR; INTRAVENOUS; SUBCUTANEOUS ONCE
Status: COMPLETED | OUTPATIENT
Start: 2021-01-01 | End: 2021-01-01

## 2021-01-01 RX ORDER — SODIUM CHLORIDE 9 MG/ML
INJECTION, SOLUTION INTRAVENOUS ONCE
Status: COMPLETED | OUTPATIENT
Start: 2021-01-01 | End: 2021-01-01

## 2021-01-01 RX ORDER — MECLIZINE HYDROCHLORIDE 25 MG/1
25 TABLET ORAL ONCE
Status: DISCONTINUED | OUTPATIENT
Start: 2021-01-01 | End: 2021-01-01 | Stop reason: HOSPADM

## 2021-01-01 RX ORDER — GADOBUTROL 604.72 MG/ML
8.5 INJECTION INTRAVENOUS ONCE
Status: COMPLETED | OUTPATIENT
Start: 2021-01-01 | End: 2021-01-01

## 2021-01-01 RX ORDER — HYDROMORPHONE HCL IN WATER/PF 6 MG/30 ML
0.4 PATIENT CONTROLLED ANALGESIA SYRINGE INTRAVENOUS ONCE
Status: COMPLETED | OUTPATIENT
Start: 2021-01-01 | End: 2021-01-01

## 2021-01-01 RX ORDER — TIOTROPIUM BROMIDE 18 UG/1
18 CAPSULE ORAL; RESPIRATORY (INHALATION) DAILY
Status: DISCONTINUED | OUTPATIENT
Start: 2021-01-01 | End: 2021-01-01 | Stop reason: HOSPADM

## 2021-01-01 RX ORDER — NALOXONE HYDROCHLORIDE 4 MG/.1ML
SPRAY NASAL
COMMUNITY
Start: 2021-01-01

## 2021-01-01 RX ORDER — IOPAMIDOL 755 MG/ML
100 INJECTION, SOLUTION INTRAVASCULAR ONCE
Status: COMPLETED | OUTPATIENT
Start: 2021-01-01 | End: 2021-01-01

## 2021-01-01 RX ORDER — SODIUM CHLORIDE, SODIUM LACTATE, POTASSIUM CHLORIDE, CALCIUM CHLORIDE 600; 310; 30; 20 MG/100ML; MG/100ML; MG/100ML; MG/100ML
INJECTION, SOLUTION INTRAVENOUS CONTINUOUS PRN
Status: DISCONTINUED | OUTPATIENT
Start: 2021-01-01 | End: 2021-01-01

## 2021-01-01 RX ORDER — ONDANSETRON 2 MG/ML
4 INJECTION INTRAMUSCULAR; INTRAVENOUS EVERY 6 HOURS PRN
Status: DISCONTINUED | OUTPATIENT
Start: 2021-01-01 | End: 2021-01-01

## 2021-01-01 RX ORDER — DULAGLUTIDE 0.75 MG/.5ML
INJECTION, SOLUTION SUBCUTANEOUS
Qty: 12 SYRINGE | Refills: 3 | Status: SHIPPED | OUTPATIENT
Start: 2021-01-01

## 2021-01-01 RX ORDER — GABAPENTIN 300 MG/1
900 CAPSULE ORAL 2 TIMES DAILY
Status: DISCONTINUED | OUTPATIENT
Start: 2021-01-01 | End: 2021-01-01 | Stop reason: HOSPADM

## 2021-01-01 RX ORDER — DEXTROSE MONOHYDRATE 25 G/50ML
25-50 INJECTION, SOLUTION INTRAVENOUS
Status: DISCONTINUED | OUTPATIENT
Start: 2021-01-01 | End: 2021-01-01 | Stop reason: HOSPADM

## 2021-01-01 RX ORDER — DIPHENHYDRAMINE HCL 12.5MG/5ML
12.5 LIQUID (ML) ORAL EVERY 6 HOURS PRN
Status: DISCONTINUED | OUTPATIENT
Start: 2021-01-01 | End: 2021-01-01 | Stop reason: HOSPADM

## 2021-01-01 RX ORDER — LIDOCAINE HYDROCHLORIDE 20 MG/ML
INJECTION, SOLUTION INFILTRATION; PERINEURAL PRN
Status: DISCONTINUED | OUTPATIENT
Start: 2021-01-01 | End: 2021-01-01

## 2021-01-01 RX ORDER — NALOXONE HYDROCHLORIDE 0.4 MG/ML
0.4 INJECTION, SOLUTION INTRAMUSCULAR; INTRAVENOUS; SUBCUTANEOUS
Status: DISCONTINUED | OUTPATIENT
Start: 2021-01-01 | End: 2021-01-01 | Stop reason: HOSPADM

## 2021-01-01 RX ORDER — HEPARIN SODIUM,PORCINE 10 UNIT/ML
5 VIAL (ML) INTRAVENOUS
Status: CANCELLED | OUTPATIENT
Start: 2021-01-01

## 2021-01-01 RX ORDER — NALOXONE HYDROCHLORIDE 0.4 MG/ML
0.2 INJECTION, SOLUTION INTRAMUSCULAR; INTRAVENOUS; SUBCUTANEOUS
Status: DISCONTINUED | OUTPATIENT
Start: 2021-01-01 | End: 2021-01-01 | Stop reason: HOSPADM

## 2021-01-01 RX ORDER — TERAZOSIN 5 MG/1
10 CAPSULE ORAL AT BEDTIME
Status: DISCONTINUED | OUTPATIENT
Start: 2021-01-01 | End: 2021-01-01

## 2021-01-01 RX ORDER — PROPOFOL 10 MG/ML
INJECTION, EMULSION INTRAVENOUS PRN
Status: DISCONTINUED | OUTPATIENT
Start: 2021-01-01 | End: 2021-01-01

## 2021-01-01 RX ORDER — TERAZOSIN 10 MG/1
CAPSULE ORAL
Qty: 180 CAPSULE | Refills: 2 | Status: SHIPPED | OUTPATIENT
Start: 2021-01-01

## 2021-01-01 RX ORDER — ALBUTEROL SULFATE 90 UG/1
2 AEROSOL, METERED RESPIRATORY (INHALATION) EVERY 6 HOURS PRN
Status: DISCONTINUED | OUTPATIENT
Start: 2021-01-01 | End: 2021-01-01 | Stop reason: HOSPADM

## 2021-01-01 RX ORDER — TRAZODONE HYDROCHLORIDE 50 MG/1
50 TABLET, FILM COATED ORAL
Status: DISCONTINUED | OUTPATIENT
Start: 2021-01-01 | End: 2021-01-01 | Stop reason: HOSPADM

## 2021-01-01 RX ORDER — ONDANSETRON 4 MG/1
4 TABLET, ORALLY DISINTEGRATING ORAL EVERY 6 HOURS PRN
Status: DISCONTINUED | OUTPATIENT
Start: 2021-01-01 | End: 2021-01-01

## 2021-01-01 RX ORDER — ALBUTEROL SULFATE 0.83 MG/ML
2.5 SOLUTION RESPIRATORY (INHALATION)
Status: CANCELLED | OUTPATIENT
Start: 2021-01-01

## 2021-01-01 RX ORDER — PROCHLORPERAZINE MALEATE 5 MG
5 TABLET ORAL EVERY 6 HOURS PRN
Status: DISCONTINUED | OUTPATIENT
Start: 2021-01-01 | End: 2021-01-01 | Stop reason: HOSPADM

## 2021-01-01 RX ORDER — BUDESONIDE AND FORMOTEROL FUMARATE DIHYDRATE 80; 4.5 UG/1; UG/1
2 AEROSOL RESPIRATORY (INHALATION) 2 TIMES DAILY
Status: DISCONTINUED | OUTPATIENT
Start: 2021-01-01 | End: 2021-01-01 | Stop reason: HOSPADM

## 2021-01-01 RX ORDER — LORAZEPAM 2 MG/ML
0.5 INJECTION INTRAMUSCULAR EVERY 4 HOURS PRN
Status: CANCELLED | OUTPATIENT
Start: 2021-01-01

## 2021-01-01 RX ORDER — HYDROMORPHONE HCL IN WATER/PF 6 MG/30 ML
0.2 PATIENT CONTROLLED ANALGESIA SYRINGE INTRAVENOUS EVERY 5 MIN PRN
Status: DISCONTINUED | OUTPATIENT
Start: 2021-01-01 | End: 2021-01-01 | Stop reason: HOSPADM

## 2021-01-01 RX ORDER — BUPROPION HYDROCHLORIDE 150 MG/1
150 TABLET, EXTENDED RELEASE ORAL 2 TIMES DAILY
Status: DISCONTINUED | OUTPATIENT
Start: 2021-01-01 | End: 2021-01-01 | Stop reason: HOSPADM

## 2021-01-01 RX ORDER — OXYCODONE HYDROCHLORIDE 5 MG/1
5 TABLET ORAL EVERY 4 HOURS PRN
Status: DISCONTINUED | OUTPATIENT
Start: 2021-01-01 | End: 2021-01-01

## 2021-01-01 RX ORDER — EPINEPHRINE 1 MG/ML
0.3 INJECTION, SOLUTION INTRAMUSCULAR; SUBCUTANEOUS EVERY 5 MIN PRN
Status: CANCELLED | OUTPATIENT
Start: 2021-01-01

## 2021-01-01 RX ORDER — SODIUM CHLORIDE 9 MG/ML
INJECTION, SOLUTION INTRAVENOUS CONTINUOUS
Status: DISCONTINUED | OUTPATIENT
Start: 2021-01-01 | End: 2021-01-01 | Stop reason: HOSPADM

## 2021-01-01 RX ORDER — ONDANSETRON 2 MG/ML
4 INJECTION INTRAMUSCULAR; INTRAVENOUS EVERY 6 HOURS PRN
Status: DISCONTINUED | OUTPATIENT
Start: 2021-01-01 | End: 2021-01-01 | Stop reason: HOSPADM

## 2021-01-01 RX ORDER — TESTOSTERONE 40.5 MG/2.5G
1 GEL TOPICAL DAILY
Status: DISCONTINUED | OUTPATIENT
Start: 2021-01-01 | End: 2021-01-01 | Stop reason: HOSPADM

## 2021-01-01 RX ORDER — LISINOPRIL 20 MG/1
20 TABLET ORAL DAILY
Status: DISCONTINUED | OUTPATIENT
Start: 2021-01-01 | End: 2021-01-01 | Stop reason: HOSPADM

## 2021-01-01 RX ORDER — OXYCODONE HYDROCHLORIDE 5 MG/1
5 TABLET ORAL EVERY 6 HOURS PRN
Status: DISCONTINUED | OUTPATIENT
Start: 2021-01-01 | End: 2021-01-01 | Stop reason: HOSPADM

## 2021-01-01 RX ORDER — HEPARIN SODIUM (PORCINE) LOCK FLUSH IV SOLN 100 UNIT/ML 100 UNIT/ML
5 SOLUTION INTRAVENOUS
Status: CANCELLED | OUTPATIENT
Start: 2021-01-01

## 2021-01-01 RX ORDER — SODIUM CHLORIDE, SODIUM LACTATE, POTASSIUM CHLORIDE, CALCIUM CHLORIDE 600; 310; 30; 20 MG/100ML; MG/100ML; MG/100ML; MG/100ML
INJECTION, SOLUTION INTRAVENOUS CONTINUOUS
Status: DISCONTINUED | OUTPATIENT
Start: 2021-01-01 | End: 2021-01-01 | Stop reason: HOSPADM

## 2021-01-01 RX ORDER — METHYLPREDNISOLONE SODIUM SUCCINATE 125 MG/2ML
125 INJECTION, POWDER, LYOPHILIZED, FOR SOLUTION INTRAMUSCULAR; INTRAVENOUS
Status: CANCELLED
Start: 2021-01-01

## 2021-01-01 RX ORDER — LIDOCAINE 40 MG/G
CREAM TOPICAL
Status: DISCONTINUED | OUTPATIENT
Start: 2021-01-01 | End: 2021-01-01 | Stop reason: HOSPADM

## 2021-01-01 RX ORDER — OXYBUTYNIN CHLORIDE 5 MG/1
TABLET ORAL
COMMUNITY
Start: 2021-01-01

## 2021-01-01 RX ORDER — ONDANSETRON 4 MG/1
4 TABLET, ORALLY DISINTEGRATING ORAL EVERY 6 HOURS PRN
Status: DISCONTINUED | OUTPATIENT
Start: 2021-01-01 | End: 2021-01-01 | Stop reason: HOSPADM

## 2021-01-01 RX ORDER — FENTANYL CITRATE 50 UG/ML
25 INJECTION, SOLUTION INTRAMUSCULAR; INTRAVENOUS EVERY 5 MIN PRN
Status: DISCONTINUED | OUTPATIENT
Start: 2021-01-01 | End: 2021-01-01 | Stop reason: HOSPADM

## 2021-01-01 RX ORDER — TERAZOSIN 5 MG/1
20 CAPSULE ORAL AT BEDTIME
Status: DISCONTINUED | OUTPATIENT
Start: 2021-01-01 | End: 2021-01-01 | Stop reason: HOSPADM

## 2021-01-01 RX ORDER — OXYCODONE HYDROCHLORIDE 5 MG/1
5 TABLET ORAL EVERY 6 HOURS PRN
COMMUNITY

## 2021-01-01 RX ORDER — LIDOCAINE 40 MG/G
CREAM TOPICAL
Status: CANCELLED | OUTPATIENT
Start: 2021-01-01

## 2021-01-01 RX ORDER — HALOPERIDOL 5 MG/ML
1 INJECTION INTRAMUSCULAR
Status: DISCONTINUED | OUTPATIENT
Start: 2021-01-01 | End: 2021-01-01 | Stop reason: HOSPADM

## 2021-01-01 RX ORDER — SODIUM CHLORIDE 9 MG/ML
100 INJECTION, SOLUTION INTRAVENOUS ONCE
Status: COMPLETED | OUTPATIENT
Start: 2021-01-01 | End: 2021-01-01

## 2021-01-01 RX ORDER — DIPHENHYDRAMINE HYDROCHLORIDE 50 MG/ML
50 INJECTION INTRAMUSCULAR; INTRAVENOUS
Status: CANCELLED
Start: 2021-01-01

## 2021-01-01 RX ORDER — ALBUTEROL SULFATE 90 UG/1
1-2 AEROSOL, METERED RESPIRATORY (INHALATION)
Status: CANCELLED
Start: 2021-01-01

## 2021-01-01 RX ORDER — TRAZODONE HYDROCHLORIDE 50 MG/1
50 TABLET, FILM COATED ORAL
Qty: 90 TABLET | Refills: 1 | Status: SHIPPED | OUTPATIENT
Start: 2021-01-01

## 2021-01-01 RX ORDER — PROCHLORPERAZINE 25 MG
12.5 SUPPOSITORY, RECTAL RECTAL EVERY 12 HOURS PRN
Status: DISCONTINUED | OUTPATIENT
Start: 2021-01-01 | End: 2021-01-01 | Stop reason: HOSPADM

## 2021-01-01 RX ORDER — OXYCODONE HYDROCHLORIDE 5 MG/1
5 TABLET ORAL ONCE
Status: COMPLETED | OUTPATIENT
Start: 2021-01-01 | End: 2021-01-01

## 2021-01-01 RX ORDER — DIPHENHYDRAMINE HYDROCHLORIDE 50 MG/ML
12.5 INJECTION INTRAMUSCULAR; INTRAVENOUS EVERY 6 HOURS PRN
Status: DISCONTINUED | OUTPATIENT
Start: 2021-01-01 | End: 2021-01-01 | Stop reason: HOSPADM

## 2021-01-01 RX ORDER — SODIUM CHLORIDE 9 MG/ML
INJECTION, SOLUTION INTRAVENOUS CONTINUOUS
Status: ACTIVE | OUTPATIENT
Start: 2021-01-01 | End: 2021-01-01

## 2021-01-01 RX ORDER — HYDROMORPHONE HCL IN WATER/PF 6 MG/30 ML
.2-.4 PATIENT CONTROLLED ANALGESIA SYRINGE INTRAVENOUS
Status: DISCONTINUED | OUTPATIENT
Start: 2021-01-01 | End: 2021-01-01 | Stop reason: HOSPADM

## 2021-01-01 RX ORDER — NALOXONE HYDROCHLORIDE 0.4 MG/ML
0.2 INJECTION, SOLUTION INTRAMUSCULAR; INTRAVENOUS; SUBCUTANEOUS
Status: CANCELLED | OUTPATIENT
Start: 2021-01-01

## 2021-01-01 RX ORDER — KETOCONAZOLE 20 MG/G
CREAM TOPICAL
Status: SHIPPED | COMMUNITY
Start: 2021-01-01

## 2021-01-01 RX ORDER — FLUOROURACIL 50 MG/ML
400 INJECTION, SOLUTION INTRAVENOUS ONCE
Status: CANCELLED | OUTPATIENT
Start: 2021-01-01

## 2021-01-01 RX ORDER — NICOTINE POLACRILEX 4 MG
15-30 LOZENGE BUCCAL
Status: DISCONTINUED | OUTPATIENT
Start: 2021-01-01 | End: 2021-01-01 | Stop reason: HOSPADM

## 2021-01-01 RX ADMIN — INSULIN ASPART 1 UNITS: 100 INJECTION, SOLUTION INTRAVENOUS; SUBCUTANEOUS at 08:27

## 2021-01-01 RX ADMIN — SODIUM CHLORIDE: 9 INJECTION, SOLUTION INTRAVENOUS at 03:54

## 2021-01-01 RX ADMIN — HYDROMORPHONE HYDROCHLORIDE 0.2 MG: 0.2 INJECTION, SOLUTION INTRAMUSCULAR; INTRAVENOUS; SUBCUTANEOUS at 03:54

## 2021-01-01 RX ADMIN — FENTANYL CITRATE 25 MCG: 50 INJECTION, SOLUTION INTRAMUSCULAR; INTRAVENOUS at 15:19

## 2021-01-01 RX ADMIN — GADOBUTROL 8.5 ML: 604.72 INJECTION INTRAVENOUS at 14:23

## 2021-01-01 RX ADMIN — GABAPENTIN 900 MG: 300 CAPSULE ORAL at 08:00

## 2021-01-01 RX ADMIN — IOPAMIDOL 100 ML: 755 INJECTION, SOLUTION INTRAVENOUS at 14:41

## 2021-01-01 RX ADMIN — HYDROMORPHONE HYDROCHLORIDE 0.2 MG: 0.2 INJECTION, SOLUTION INTRAMUSCULAR; INTRAVENOUS; SUBCUTANEOUS at 10:18

## 2021-01-01 RX ADMIN — Medication 2.5 MG: at 08:00

## 2021-01-01 RX ADMIN — HYDROMORPHONE HYDROCHLORIDE 0.2 MG: 0.2 INJECTION, SOLUTION INTRAMUSCULAR; INTRAVENOUS; SUBCUTANEOUS at 00:37

## 2021-01-01 RX ADMIN — HYDROMORPHONE HYDROCHLORIDE 0.4 MG: 0.2 INJECTION, SOLUTION INTRAMUSCULAR; INTRAVENOUS; SUBCUTANEOUS at 11:44

## 2021-01-01 RX ADMIN — Medication 2.5 MG: at 20:02

## 2021-01-01 RX ADMIN — SODIUM CHLORIDE, POTASSIUM CHLORIDE, SODIUM LACTATE AND CALCIUM CHLORIDE: 600; 310; 30; 20 INJECTION, SOLUTION INTRAVENOUS at 15:58

## 2021-01-01 RX ADMIN — LIDOCAINE HYDROCHLORIDE 60 ML: 20 INJECTION, SOLUTION INFILTRATION; PERINEURAL at 13:33

## 2021-01-01 RX ADMIN — SODIUM CHLORIDE: 9 INJECTION, SOLUTION INTRAVENOUS at 17:41

## 2021-01-01 RX ADMIN — SODIUM CHLORIDE: 9 INJECTION, SOLUTION INTRAVENOUS at 11:32

## 2021-01-01 RX ADMIN — GABAPENTIN 900 MG: 300 CAPSULE ORAL at 20:02

## 2021-01-01 RX ADMIN — SODIUM CHLORIDE 100 ML: 9 INJECTION, SOLUTION INTRAVENOUS at 13:07

## 2021-01-01 RX ADMIN — IOPAMIDOL 75 ML: 755 INJECTION, SOLUTION INTRAVENOUS at 11:40

## 2021-01-01 RX ADMIN — ONDANSETRON 4 MG: 2 INJECTION INTRAMUSCULAR; INTRAVENOUS at 13:43

## 2021-01-01 RX ADMIN — FENTANYL CITRATE 25 MCG: 50 INJECTION, SOLUTION INTRAMUSCULAR; INTRAVENOUS at 15:49

## 2021-01-01 RX ADMIN — HYDROMORPHONE HYDROCHLORIDE 0.2 MG: 0.2 INJECTION, SOLUTION INTRAMUSCULAR; INTRAVENOUS; SUBCUTANEOUS at 15:59

## 2021-01-01 RX ADMIN — BUPROPION HYDROCHLORIDE 150 MG: 150 TABLET, EXTENDED RELEASE ORAL at 08:00

## 2021-01-01 RX ADMIN — HYDROMORPHONE HYDROCHLORIDE 0.5 MG: 1 INJECTION, SOLUTION INTRAMUSCULAR; INTRAVENOUS; SUBCUTANEOUS at 13:08

## 2021-01-01 RX ADMIN — FENTANYL CITRATE 25 MCG: 50 INJECTION, SOLUTION INTRAMUSCULAR; INTRAVENOUS at 15:30

## 2021-01-01 RX ADMIN — BUDESONIDE AND FORMOTEROL FUMARATE DIHYDRATE 2 PUFF: 80; 4.5 AEROSOL RESPIRATORY (INHALATION) at 08:00

## 2021-01-01 RX ADMIN — Medication 1 MG: at 00:35

## 2021-01-01 RX ADMIN — HYDROMORPHONE HYDROCHLORIDE 0.2 MG: 0.2 INJECTION, SOLUTION INTRAMUSCULAR; INTRAVENOUS; SUBCUTANEOUS at 22:21

## 2021-01-01 RX ADMIN — OXYCODONE HYDROCHLORIDE 5 MG: 5 TABLET ORAL at 00:34

## 2021-01-01 RX ADMIN — PROPOFOL 180 MG: 10 INJECTION, EMULSION INTRAVENOUS at 13:33

## 2021-01-01 RX ADMIN — OXYCODONE HYDROCHLORIDE 5 MG: 5 TABLET ORAL at 14:46

## 2021-01-01 RX ADMIN — HYDROMORPHONE HYDROCHLORIDE 0.4 MG: 0.2 INJECTION, SOLUTION INTRAMUSCULAR; INTRAVENOUS; SUBCUTANEOUS at 19:26

## 2021-01-01 RX ADMIN — INSULIN ASPART 1 UNITS: 100 INJECTION, SOLUTION INTRAVENOUS; SUBCUTANEOUS at 12:14

## 2021-01-01 RX ADMIN — FENTANYL CITRATE 25 MCG: 50 INJECTION, SOLUTION INTRAMUSCULAR; INTRAVENOUS at 15:40

## 2021-01-01 RX ADMIN — INSULIN ASPART 1 UNITS: 100 INJECTION, SOLUTION INTRAVENOUS; SUBCUTANEOUS at 19:54

## 2021-01-01 RX ADMIN — OXYCODONE HYDROCHLORIDE 5 MG: 5 TABLET ORAL at 07:59

## 2021-01-01 RX ADMIN — ROCURONIUM BROMIDE 40 MG: 50 INJECTION, SOLUTION INTRAVENOUS at 13:33

## 2021-01-01 RX ADMIN — FENTANYL CITRATE 100 MCG: 50 INJECTION, SOLUTION INTRAMUSCULAR; INTRAVENOUS at 13:33

## 2021-01-01 RX ADMIN — BUPROPION HYDROCHLORIDE 150 MG: 150 TABLET, EXTENDED RELEASE ORAL at 20:02

## 2021-01-01 RX ADMIN — LISINOPRIL 20 MG: 20 TABLET ORAL at 08:00

## 2021-01-01 RX ADMIN — SODIUM CHLORIDE, POTASSIUM CHLORIDE, SODIUM LACTATE AND CALCIUM CHLORIDE: 600; 310; 30; 20 INJECTION, SOLUTION INTRAVENOUS at 13:25

## 2021-01-01 RX ADMIN — HYDROMORPHONE HYDROCHLORIDE 0.2 MG: 0.2 INJECTION, SOLUTION INTRAMUSCULAR; INTRAVENOUS; SUBCUTANEOUS at 06:07

## 2021-01-01 RX ADMIN — HYDROMORPHONE HYDROCHLORIDE 0.2 MG: 0.2 INJECTION, SOLUTION INTRAMUSCULAR; INTRAVENOUS; SUBCUTANEOUS at 04:21

## 2021-01-01 ASSESSMENT — ENCOUNTER SYMPTOMS
ABDOMINAL PAIN: 1
LIGHT-HEADEDNESS: 1
HEADACHES: 0
NUMBNESS: 0
MYALGIAS: 1
NAUSEA: 0
FATIGUE: 1
DYSURIA: 0
DIARRHEA: 0
TROUBLE SWALLOWING: 0
CONFUSION: 0
COUGH: 0
FEVER: 0
HEADACHES: 0
SHORTNESS OF BREATH: 0
DIZZINESS: 0
VOMITING: 0
ROS SKIN COMMENTS: POSITIVE FOR JAUNDICE.
WEAKNESS: 0

## 2021-01-01 ASSESSMENT — COPD QUESTIONNAIRES
COPD: 1
CAT_SEVERITY: MODERATE

## 2021-01-01 ASSESSMENT — MIFFLIN-ST. JEOR
SCORE: 1565.02
SCORE: 1725.82
SCORE: 1575.68
SCORE: 1712.21
SCORE: 1575.68

## 2021-01-01 ASSESSMENT — LIFESTYLE VARIABLES: TOBACCO_USE: 1

## 2021-01-01 ASSESSMENT — PAIN SCALES - GENERAL
PAINLEVEL: WORST PAIN (10)
PAINLEVEL: SEVERE PAIN (6)

## 2021-01-01 ASSESSMENT — COLUMBIA-SUICIDE SEVERITY RATING SCALE - C-SSRS
2. HAVE YOU ACTUALLY HAD ANY THOUGHTS OF KILLING YOURSELF IN THE PAST MONTH?: NO
1. IN THE PAST MONTH, HAVE YOU WISHED YOU WERE DEAD OR WISHED YOU COULD GO TO SLEEP AND NOT WAKE UP?: NO

## 2021-02-01 ENCOUNTER — RECORDS - HEALTHEAST (OUTPATIENT)
Dept: LAB | Facility: CLINIC | Age: 71
End: 2021-02-01

## 2021-02-01 LAB
ALBUMIN SERPL-MCNC: 3.8 G/DL (ref 3.5–5)
ALP SERPL-CCNC: 71 U/L (ref 45–120)
ALT SERPL W P-5'-P-CCNC: 12 U/L (ref 0–45)
ANION GAP SERPL CALCULATED.3IONS-SCNC: 9 MMOL/L (ref 5–18)
AST SERPL W P-5'-P-CCNC: 14 U/L (ref 0–40)
BILIRUB SERPL-MCNC: 0.7 MG/DL (ref 0–1)
BUN SERPL-MCNC: 14 MG/DL (ref 8–28)
CALCIUM SERPL-MCNC: 8.9 MG/DL (ref 8.5–10.5)
CHLORIDE BLD-SCNC: 104 MMOL/L (ref 98–107)
CO2 SERPL-SCNC: 28 MMOL/L (ref 22–31)
CREAT SERPL-MCNC: 1.15 MG/DL (ref 0.7–1.3)
GFR SERPL CREATININE-BSD FRML MDRD: >60 ML/MIN/1.73M2
GLUCOSE BLD-MCNC: 99 MG/DL (ref 70–125)
MAGNESIUM SERPL-MCNC: 2.2 MG/DL (ref 1.8–2.6)
POTASSIUM BLD-SCNC: 4.2 MMOL/L (ref 3.5–5)
PROT SERPL-MCNC: 6.8 G/DL (ref 6–8)
SODIUM SERPL-SCNC: 141 MMOL/L (ref 136–145)

## 2021-05-26 NOTE — PROGRESS NOTES
Surgery/Procedure: EVAR with I.R.    Special Equipment: I.R.    Location: [facility]    Date: 4/9/19    Time: 11am     Surgeon: Dr. Brian    Assist: to be determined    Length of Surgery: 3 1/2 hrs     OR Confirmed/ :  Alannah hicks on 3/21/19    Orders In:  Yes    Provider Team Notified:  Yes    Entered on [facility] / [facility] Calendar:  Yes    Post Op: cta abd pelvic 1 month orders

## 2021-05-27 NOTE — PATIENT INSTRUCTIONS - HE
Will order home oxygen evaluation    Consider going to [facility] Weight loss clinic    Continue metformin.  Kidney and liver function are normal    Consider starting Victoza or Trulicity.  This helps with weight loss.  You would need to see diabetic educator first

## 2021-05-27 NOTE — TELEPHONE ENCOUNTER
Refill Approved    Rx renewed per Medication Renewal Policy. Medication was last renewed on 10/16/17.    Ov: 2/13/19    Sofi Rivas, [facility] Triage/Med Refill 3/24/2019     Requested Prescriptions   Pending Prescriptions Disp Refills     buPROPion (WELLBUTRIN SR) 150 MG 12 hr tablet 180 tablet 2     Sig: TAKE 1 TABLET BY MOUTH TWICE DAILY    Tricyclics/Misc Antidepressant/Antianxiety Meds Refill Protocol Passed - 3/24/2019 10:41 AM       Passed - PCP or prescribing provider visit in last year    Last office visit with prescriber/PCP: 2/13/2019 Bekah Castanon MD OR same dept: 2/13/2019 Bekah Castanon MD OR same specialty: 2/13/2019 Bekah Castanon MD  Last physical: Visit date not found Last MTM visit: Visit date not found   Next visit within 3 mo: Visit date not found  Next physical within 3 mo: Visit date not found  Prescriber OR PCP: Bekah Castanon MD  Last diagnosis associated with med order: 1. Encounter for smoking cessation counseling  - buPROPion (WELLBUTRIN SR) 150 MG 12 hr tablet; TAKE 1 TABLET BY MOUTH TWICE DAILY  Dispense: 180 tablet; Refill: 2    2. Type 2 diabetes mellitus (H)  - metFORMIN (GLUCOPHAGE XR) 500 MG 24 hr tablet; Take 2 tablets (1,000 mg total) by mouth daily with breakfast.  Dispense: 180 tablet; Refill: 3    If protocol passes may refill for 12 months if within 3 months of last provider visit (or a total of 15 months).             metFORMIN (GLUCOPHAGE XR) 500 MG 24 hr tablet 180 tablet 3     Sig: Take 2 tablets (1,000 mg total) by mouth daily with breakfast.    Metformin Refill Protocol Passed - 3/24/2019 10:41 AM       Passed - Blood pressure in last 12 months    BP Readings from Last 1 Encounters:   03/21/19 118/72            Passed - LFT or AST or ALT in last 12 months    Albumin   Date Value Ref Range Status   02/14/2019 3.6 3.5 - 5.0 g/dL Final     Bilirubin, Total   Date Value Ref Range Status   02/14/2019 1.0 0.0 - 1.0 mg/dL Final     Bilirubin, Direct   Date Value  Ref Range Status   04/01/2016 0.3 <=0.5 mg/dL Final     Alkaline Phosphatase   Date Value Ref Range Status   02/14/2019 61 45 - 120 U/L Final     AST   Date Value Ref Range Status   02/14/2019 22 0 - 40 U/L Final     ALT   Date Value Ref Range Status   02/14/2019 26 0 - 45 U/L Final     Protein, Total   Date Value Ref Range Status   02/14/2019 6.6 6.0 - 8.0 g/dL Final               Passed - GFR or Serum Creatinine in last 6 months    GFR MDRD Non Af Amer   Date Value Ref Range Status   02/14/2019 >60 >60 mL/min/1.73m2 Final     GFR MDRD Af Amer   Date Value Ref Range Status   02/14/2019 >60 >60 mL/min/1.73m2 Final            Passed - Visit with PCP or prescribing provider visit in last 6 months or next 3 months    Last office visit with prescriber/PCP: 2/13/2019 OR same dept: 2/13/2019 Bekah Castanon MD OR same specialty: 2/13/2019 Bekah Castanon MD Last physical: Visit date not found Last MTM visit: Visit date not found         Next appt within 3 mo: Visit date not found  Next physical within 3 mo: Visit date not found  Prescriber OR PCP: Bekah Castanon MD  Last diagnosis associated with med order: 1. Encounter for smoking cessation counseling  - buPROPion (WELLBUTRIN SR) 150 MG 12 hr tablet; TAKE 1 TABLET BY MOUTH TWICE DAILY  Dispense: 180 tablet; Refill: 2    2. Type 2 diabetes mellitus (H)  - metFORMIN (GLUCOPHAGE XR) 500 MG 24 hr tablet; Take 2 tablets (1,000 mg total) by mouth daily with breakfast.  Dispense: 180 tablet; Refill: 3     If protocol passes may refill for 12 months if within 3 months of last provider visit (or a total of 15 months).          Passed - A1C in last 6 months    Hemoglobin A1c   Date Value Ref Range Status   02/14/2019 6.0 3.5 - 6.0 % Final              Passed - Microalbumin in last year     Microalbumin, Random Urine   Date Value Ref Range Status   10/22/2018 <0.50 0.00 - 1.99 mg/dL Final

## 2021-05-27 NOTE — ANESTHESIA POSTPROCEDURE EVALUATION
Patient: Raf Dalton  Endovascular aneurysm repair, bilateral large bore access closure, selective renal artery catheterization  Anesthesia type: MAC    Patient location: PACU  Last vitals:   Vitals Value Taken Time   /93 4/9/2019  2:01 PM   Temp 36.9  C (98.4  F) 4/9/2019  1:24 PM   Pulse 74 4/9/2019  2:04 PM   Resp 32 4/9/2019  2:04 PM   SpO2 91 % 4/9/2019  2:04 PM   Vitals shown include unvalidated device data.  Post vital signs: stable  Level of consciousness: awake and responds to simple questions  Post-anesthesia pain: pain controlled  Post-anesthesia nausea and vomiting: no  Pulmonary: unassisted  Cardiovascular: stable  Hydration: adequate  Anesthetic events: no    QCDR Measures:  ASA# 11 - Claribel-op Cardiac Arrest: ASA11B - Patient did NOT experience unanticipated cardiac arrest  ASA# 12 - Claribel-op Mortality Rate: ASA12B - Patient did NOT die  ASA# 13 - PACU Re-Intubation Rate: NA - No ETT / LMA used for case  ASA# 10 - Composite Anes Safety: ASA10A - No serious adverse event    Additional Notes:

## 2021-05-27 NOTE — TELEPHONE ENCOUNTER
Wrong dept associated with this encounter. Encounter closed and new encounter started.    Sofi Rivas, RN, BSN, PHN  [facility] Medication Refill Nurse  3/24/2019  10:40 AM

## 2021-05-27 NOTE — TELEPHONE ENCOUNTER
Spoke with patient regarding pain in groin at night follow surgery. Pain is a 2-3 on pain scale of 10. Patient was using tylenol in the hospital. No increased swelling or bubbling reported. Brusing continues. Advised to take Tylenol 325mg 2 tablet prior to bed time. Call if doesn't improve.Spoke with dr Brian's nurse ok with plan.

## 2021-05-27 NOTE — PROGRESS NOTES
MTM Transition of Care Encounter  Assessment & Plan                                                     Post Discharge Medication Reconciliation Status: discharge medications reconciled, continue medications without change    1. AAA (abdominal aortic aneurysm) without rupture (H)  Very little pain, follow-up today with PCP and as scheduled with Dr. Brian in May.     2. Type 2 diabetes mellitus without complication, without long-term current use of insulin (H)  At goal A1c less than 7.5% per ADA guidelines, however would benefit from weight loss.  Discussed GLP-1 agonists in the benefits and assisting with weight loss, as well as cardiac and renal safety data.  At this time he does not want to go to the weight loss clinic due to added cost and distance to drive.  He has an appointment with diabetes education at the end of May.  Consider initiating GLP-1 agonist per insurance coverage, I would be happy to assist in starting this medication now, and he would be able to follow-up with diabetes education once already on the medication.  Will discuss with PCP and call the patient for next steps.   -Consider initiating Trulicity 0.75 mg weekly or victoza once daily     3. Chronic obstructive pulmonary disease, unspecified COPD type (H)  Poor understanding of his inhalers, discussed importance of maintenance inhalers including Spiriva and Symbicort.  Provided education on mechanism of action and how to properly administer.  I will order a new spacer for him to use with his Symbicort.  Encouraged him to monitor if his use of albuterol goes down which would be a marker of positive benefits from his maintenance inhalers.  -Initiate inhalational spacing device Spcr; Use two times a day with symbicort  Dispense: 1 each; Refill: 0  -Restart Symbicort 2 puffs twice daily    4. Anxiety  Briefly discussed however difficult over the phone, recommend reassess at follow-up.      5. Benign prostatic hyperplasia with weak urinary  stream  Reviewed previous low testosterone numbers.  He has not been taking testosterone for some time, however continues on his medication list.  Discussed that his anxiety, low libido, difficulty with losing weight may also be compounded by his low testosterone.  He has plenty of a supply at home, recommend to restart this with close follow-up to monitor for risks versus benefits of testosterone therapy.  He will call and make follow-up appointment with urology.  -Restart transdermal testosterone, close follow-up with urology (scheduled 4/29/19)     Follow Up  Return in about 3 weeks (around 5/8/2019) for Surgeon .    Subjective & Objective                                                       Raf Dalton is a 68 y.o. male called for a transitions of care visit. he was discharged from [facility] on 4/10/19 for AAA Repair.  Phone call was completed with he and his wife.    Chief Complaint: Hospital Follow Up     Medication Adherence/Access: he has somewhat poor understanding of his medications, he has stopped a few medications without understanding     AAA repair: follow up with Dr Brian on 5/8/19. He has minor discomfort on the left side, evaluated by PCP earlier today.     Type 2 diabetes: They are worried about metformin and if this is toxic to his kidneys. Normal bowel movements.   Lab Results   Component Value Date    HGBA1C 7.0 (H) 04/17/2019    HGBA1C 7.1 (H) 04/01/2019    HGBA1C 6.0 02/14/2019     Lab Results   Component Value Date    GLUF 116 (H) 06/02/2014    MICROALBUR <0.50 10/22/2018    LDLCALC 70 10/22/2018    CREATININE 0.97 04/17/2019     COPD: Continues on daily Spiriva, but is not using Symbicort.  He does not understand what these medications are for and why he needs to use them.  He previously had a spacer for his Symbicort and is hoping to figure out where that is.  He is wondering why he needs to rinse his mouth out after all of these inhalers.  He uses albuterol only as needed.      Anxiety: on two times a day bupropion.  He did not realize that this could help with anxiety, his wife feels that he should be on additional medication.  He is somewhat frustrated by her interjecting into the visit regarding this issue.    BPH: he doesn't think that the testosterone worked while he was on it. He did not use it daily when he purchased it. He still has the testosterone at home. Follow up with urology planned.   Testosterone: 171 (17)   PSA: 0.37 (18)    PMH: reviewed in EPIC   Allergies/ADRs: reviewed in EPIC   Alcohol: reviewed in EPIC   Tobacco:   Social History     Tobacco Use   Smoking Status Former Smoker     Packs/day: 1.50     Years: 55.00     Pack years: 82.50     Types: Cigarettes     Last attempt to quit: 10/18/2017     Years since quittin.4   Smokeless Tobacco Never Used     Recent Vitals:   BP Readings from Last 3 Encounters:   19 118/62   04/10/19 112/59   19 130/76      Wt Readings from Last 3 Encounters:   19 (!) 231 lb 4 oz (104.9 kg)   04/10/19 (!) 234 lb (106.1 kg)   19 (!) 236 lb (107 kg)     ----------------    Much or all of the text in this note was generated through the use of Dragon Dictate voice-to-text software. Errors in spelling or words which seem out of context are unintentional. Sound alike errors, in particular, may have escaped editing.    The patient declined an after visit summary    I spent 45 minutes with this patient today;  . All changes were made via collaborative practice agreement with Bekah Castanon MD. A copy of the visit note was provided to the patient's provider.     Thomas Wood, PharmD, BCACP  Medication Management (MTM) Pharmacist  [facility]        Current Outpatient Medications   Medication Sig Dispense Refill     ACCU-CHEK TORI Misc        ACCU-CHEK SMARTVIEW TEST STRIP strips TEST BLOOD SUGAR TWICE DAILY 200 strip 3     acetaminophen (TYLENOL) 500 MG tablet Take 500 mg by  mouth as needed.       albuterol (PROAIR HFA;PROVENTIL HFA;VENTOLIN HFA) 90 mcg/actuation inhaler Inhale 2 puffs every 6 (six) hours as needed for wheezing. 1 each 6     albuterol (PROVENTIL) 2.5 mg /3 mL (0.083 %) nebulizer solution Take 3 mL (2.5 mg total) by nebulization every 6 (six) hours as needed for wheezing or shortness of breath. 25 vial 2     aspirin 81 MG EC tablet Take 1 tablet (81 mg total) by mouth daily.  0     budesonide-formoterol (SYMBICORT) 80-4.5 mcg/actuation inhaler Inhale 2 puffs 2 (two) times a day. 1 Inhaler 12     buPROPion (WELLBUTRIN SR) 150 MG 12 hr tablet TAKE 1 TABLET TWICE DAILY 180 tablet 1     cholecalciferol, vitamin D3, (VITAMIN D3) 5,000 unit Tab Take 1 tablet (5,000 Units total) by mouth daily. 90 tablet 3     fluticasone propionate (FLONASE) 50 mcg/actuation nasal spray 1 spray into each nostril 2 (two) times a day as needed. 16 g 11     gabapentin (NEURONTIN) 300 MG capsule Take 2 capsules in the morning, 1 capsule in the afternoon and 3 capsules at night 540 capsule 3     generic lancets (ACCU-CHEK FASTCLIX) USE TO TEST TWICE DAILY 200 each 3     inhalational spacing device Spcr Use two times a day with symbicort 1 each 0     lisinopril (PRINIVIL,ZESTRIL) 20 MG tablet TAKE 1 TABLET TWICE DAILY FOR BLOOD PRESSURE 180 tablet 1     metFORMIN (GLUCOPHAGE-XR) 500 MG 24 hr tablet TAKE 2 TABLETS TWICE DAILY 360 tablet 3     simvastatin (ZOCOR) 40 MG tablet TAKE 1 AND 1/2 TABLETS EVERY  tablet 3     terazosin (HYTRIN) 10 MG capsule Take 1 capsule (10 mg total) by mouth at bedtime. 90 capsule 3     testosterone (ANDROGEL) 1.62 % (40.5 mg/2.5 gram) GlPk Apply 1 pck daily 75 g 5     tiotropium (SPIRIVA WITH HANDIHALER) 18 mcg inhalation capsule INHALE CONTENTS OF 1 CAPSULE DAILY 30 capsule 5     traZODone (DESYREL) 50 MG tablet Take 1 tablet (50 mg total) by mouth at bedtime as needed for sleep. 90 tablet 0     No current facility-administered medications for this visit.

## 2021-05-27 NOTE — PATIENT INSTRUCTIONS - HE
Do not take metformin and lisinopril on morning of surgery    Stop vitamin D one week before surgery.  Restart after surgery    I recommend you see a cardiologist about your abnormal stress test    Bring CPAP to hospital    Try Debrox drops for earwax    Try taking a dose of gabapentin at noon

## 2021-05-27 NOTE — ANESTHESIA PROCEDURE NOTES
Arterial Line  Reason for Procedure: hemodynamic monitoring  Patient location during procedure: OR pre-induction and Pre-op  Start time: 4/9/2019 10:27 AM  End time: 4/9/2019 10:35 AM  Staffing:  Performing  Anesthesiologist: Dima Lord MD  Sterile Precautions:  sterile barriers used during insertion: cap, mask, sterile gloves, large sheet, and hand hygiene used.  Arterial Line:   Immediately prior to procedure a time out was called to verify the correct patient, procedure, equipment, support staff and site/side marked as required  Laterality: right  Location: radial  Prepped with: ChloroPrep    Needle gauge: 20 G  Number of Attempts: 1  Secured with: tape  Flushed with: saline  1% lidocaine local anesthesia used for skin prep.   See MAR for additional medications given.  Ultrasound evaluation of access site: yes  Vessel patent by US exam    Concurrent real time visualization of needle entry

## 2021-05-27 NOTE — PROGRESS NOTES
Preoperative Exam    Scheduled Procedure: endovascular aneurysm repair, bilat larger bore access closure, selective renal artery catheterization   Surgery Date:  4/9/2019  Surgery Location: [facility], fax [phone]    Surgeon:  Marquita Cope    Assessment/Plan:     Raf was seen today for pre-op exam.    Pre-operative examination  -     Glycosylated Hemoglobin A1c  -     Hemoglobin  -     Ambulatory referral to [facility]  -Patient with multiple underlying risk factors for planned procedure which would include severe COPD, coronary artery disease with new finding of abnormal stress test, type 2 diabetes, hypertension and obstructive sleep apnea.  Current medical conditions are stable.  No symptoms of COPD exacerbation at this time.  He is compliant with inhaler regimen.  He is compliant with CPAP machine.  He does have dyspnea on exertion but denies other cardiac symptoms.  However given risk factors and planned procedure, I am recommending that patient be seen at the cardiology rapid access clinic for consultation regarding preoperative recommendations and recent abnormal stress test.  Referral is placed.  We will check A1c, comprehensive metabolic panel and A1c today.    AAA (abdominal aortic aneurysm) without rupture (H)  He is planning to undergo endovascular repair of abdominal aortic aneurysm with local anesthesia    Type 2 diabetes mellitus (H)  -     Glycosylated Hemoglobin A1c  -Continue metformin.  He will hold metformin morning of surgery.  Blood pressure controlled.  He is on aspirin and statin    Essential hypertension, benign  -     Comprehensive Metabolic Panel  -Blood pressure is controlled.  He will hold lisinopril on morning of surgery.    Obstructive Sleep Apnea  Continue CPAP    Mixed hyperlipidemia  Continue statin.  Currently on gabapentin.      Diabetic polyneuropathy associated with type 2 diabetes mellitus (H)   Currently on gabapentin.  Will increase dose of  gabapentin and add in 300 mg at noon.  He will continue with 600 mg in the morning and 900 mg at at bedtime    Abnormal stress test  -     Ambulatory referral to [facility]    Chronic obstructive pulmonary disease, unspecified COPD type (H)  Continue current inhaler regimen    Back pain  -     gabapentin (NEURONTIN) 300 MG capsule; Take 2 capsules in the morning, 1 capsule in the afternoon and 3 capsules at night        Surgical Procedure Risk: Vascular/High (reported cardiac risk often > 5%)  Have you had prior anesthesia?: Yes  Have you or any family members had a previous anesthesia reaction:  No  Do you or any family members have a history of a clotting or bleeding disorder?: No  Cardiac Risk Assessment: increased risk for major cardiac complications based on  positive cardiac stress test    Patient is not approved for surgery . Recommend referral to [facility] for pre-op clearance given recent abnormal stress test    Please Note:  Patient uses a CPAP machine.    Functional Status: Independent  Patient plans to recover at home with family.     Subjective:      Raf Dalton is a 68 y.o. male who presents for a preoperative consultation.  He has a 5.8 infrarenal abdominal aortic aneurysm but that has been slowly increasing in size over the past several years.  He is now at the point where endovascular repair is recommended.  His medical history is complicated with underlying obstructive sleep apnea for which he uses CPAP machine, hypertension, hyperlipidemia, type 2 diabetes as well as severe COPD.  We reviewed and updated his medications, allergies, family and social history.  He is a former smoker.  Quit in 2017.  States that he is currently feeling well.  States that he has been compliant with his Symbicort and Spiriva inhalers and has been using albuterol inhaler sparingly.  He reports he is compliant with his CPAP at night and does use supplemental oxygen through this.  He was seen last  month for a pulmonary consult prior to AAA repair.  Consult note is reviewed.  Pulmonologist advised that patient is at an approximately 10.1% risk of respiratory failure postoperatively.  Therefore his vascular surgeon is planning to do the procedure with local anesthesia.  Patient also underwent a nuclear medicine stress test on March 18, 2019 which was ordered by his vascular surgeon.  Results did show a small area of distal anteroseptal ischemia which is a new finding compared to prior stress test from 2017.  Patient was unaware of these results.  On review of systems he does report dyspnea with exertion but denies chest pain or pressure.  States overall he is feeling well.  He does cough intermittently throughout the encounter.  Denies fevers and chills.  No increased cough or sputum production.  No increased dyspnea.  No lower extremity edema.  Review of systems otherwise negative.  No other questions today.    All other systems reviewed and are negative, other than those listed in the HPI.    Pertinent History  Do you have difficulty breathing or chest pain after walking up a flight of stairs: Yes: dyspnea, no chest pain/pressure  History of obstructive sleep apnea: Yes: uses CPAP  Steroid use in the last 6 months: No  Frequent Aspirin/NSAID use: Yes: ASA 81 mg daily  Prior Blood Transfusion: No  Prior Blood Transfusion Reaction: No  If for some reason prior to, during or after the procedure, if it is medically indicated, would you be willing to have a blood transfusion?:  There is no transfusion refusal.    Current Outpatient Medications   Medication Sig Dispense Refill     ACCU-CHEK TORI Misc        ACCU-CHEK SMARTVIEW TEST STRIP strips TEST BLOOD SUGAR TWICE DAILY 200 strip 3     albuterol (PROAIR HFA;PROVENTIL HFA;VENTOLIN HFA) 90 mcg/actuation inhaler Inhale 2 puffs every 6 (six) hours as needed for wheezing. 1 each 6     aspirin 81 MG EC tablet Take 1 tablet (81 mg total) by mouth daily.  0      budesonide-formoterol (SYMBICORT) 80-4.5 mcg/actuation inhaler Inhale 2 puffs 2 (two) times a day. 1 Inhaler 12     buPROPion (WELLBUTRIN SR) 150 MG 12 hr tablet Take 1 tablet (150 mg total) by mouth 2 (two) times a day. 180 tablet 3     cholecalciferol, vitamin D3, (VITAMIN D3) 5,000 unit Tab Take 1 tablet (5,000 Units total) by mouth daily. 90 tablet 3     fluticasone (FLONASE) 50 mcg/actuation nasal spray 1 spray into each nostril 2 (two) times a day. 16 g 11     gabapentin (NEURONTIN) 300 MG capsule Take 2 capsules in the morning, 1 capsule in the afternoon and 3 capsules at night 540 capsule 3     generic lancets (ACCU-CHEK FASTCLIX) USE TO TEST TWICE DAILY 200 each 3     lisinopril (PRINIVIL,ZESTRIL) 20 MG tablet TAKE 1 TABLET TWICE DAILY FOR BLOOD PRESSURE 180 tablet 1     metFORMIN (GLUCOPHAGE XR) 500 MG 24 hr tablet Take 2 tablets (1,000 mg total) by mouth daily with breakfast. 180 tablet 3     simvastatin (ZOCOR) 40 MG tablet TAKE 1 AND 1/2 TABLETS EVERY  tablet 3     terazosin (HYTRIN) 10 MG capsule Take 1 capsule (10 mg total) by mouth at bedtime. 90 capsule 3     tiotropium (SPIRIVA WITH HANDIHALER) 18 mcg inhalation capsule INHALE CONTENTS OF 1 CAPSULE DAILY 30 capsule 5     traZODone (DESYREL) 50 MG tablet Take 1 tablet (50 mg total) by mouth at bedtime. As needed for sleep 90 tablet 0     albuterol (PROVENTIL) 2.5 mg /3 mL (0.083 %) nebulizer solution Take 3 mL (2.5 mg total) by nebulization every 6 (six) hours as needed for wheezing or shortness of breath. 25 vial 2     testosterone (ANDROGEL) 1.62 % (40.5 mg/2.5 gram) GlPk Apply 1 pck daily 75 g 5     No current facility-administered medications for this visit.         No Known Allergies    Patient Active Problem List   Diagnosis     Obstructive Sleep Apnea     Male Erectile Disorder Due To Physical Condition     Mixed hyperlipidemia     Cataract     Lower Back Sprain     Benign Prostatic Hypertrophy With Urinary Obstruction     Hypertension      Cervicalgia     Simple chronic bronchitis (H)     Benign Prostatic Hypertrophy     Aneurysm Of The Abdominal Aorta     Vitamin D Deficiency     Exercise hypoxemia     Type 2 diabetes mellitus (H)     Back pain     AAA (abdominal aortic aneurysm) without rupture (H)       Past Medical History:   Diagnosis Date     Aneurysm Of The Abdominal Aorta     Created by Conversion      Benign Prostatic Hypertrophy     Created by Conversion      Benign Prostatic Hypertrophy With Urinary Obstruction     Created by Conversion      Cataract     Created by Conversion      Cervicalgia     Created by Conversion      Chronic Obstructive Pulmonary Disease     Created by Conversion      Diabetes mellitus, type II (H)      High cholesterol      Hyperglycemia     Created by Conversion      Hyperlipidemia     Created by Conversion      Hypertension     Created by Conversion      Hypoxia     Created by Conversion      Lower Back Sprain     Created by Conversion      Obstructive Sleep Apnea     Created by Conversion      Vitamin D Deficiency     Created by Conversion        Past Surgical History:   Procedure Laterality Date     FINGER SURGERY         Social History     Socioeconomic History     Marital status:      Spouse name: Not on file     Number of children: Not on file     Years of education: Not on file     Highest education level: Not on file   Occupational History     Not on file   Social Needs     Financial resource strain: Not on file     Food insecurity:     Worry: Not on file     Inability: Not on file     Transportation needs:     Medical: Not on file     Non-medical: Not on file   Tobacco Use     Smoking status: Former Smoker     Packs/day: 1.50     Years: 55.00     Pack years: 82.50     Types: Cigarettes     Last attempt to quit: 10/18/2017     Years since quittin.4     Smokeless tobacco: Never Used   Substance and Sexual Activity     Alcohol use: Yes     Alcohol/week: 12.6 oz     Types: 21 Cans of beer per  "week     Comment: 21 beers per week     Drug use: No     Sexual activity: No     Partners: Female     Birth control/protection: None   Lifestyle     Physical activity:     Days per week: Not on file     Minutes per session: Not on file     Stress: Not on file   Relationships     Social connections:     Talks on phone: Not on file     Gets together: Not on file     Attends Mandaeism service: Not on file     Active member of club or organization: Not on file     Attends meetings of clubs or organizations: Not on file     Relationship status: Not on file     Intimate partner violence:     Fear of current or ex partner: Not on file     Emotionally abused: Not on file     Physically abused: Not on file     Forced sexual activity: Not on file   Other Topics Concern     Not on file   Social History Narrative     Not on file       Patient Care Team:  Bekah Castanon MD as PCP - General (Family Medicine)          Objective:     Vitals:    04/01/19 1007   BP: 112/62   Pulse: 71   Temp: 98.1  F (36.7  C)   TempSrc: Oral   SpO2: 93%   Weight: (!) 232 lb 7 oz (105.4 kg)   Height: 5' 8.5\" (1.74 m)         Physical Exam:  Physical Exam  Physical Examination: General appearance - alert, well appearing, and in no distress  Mental status - alert, oriented to person, place, and time  Eyes - pupils equal and reactive, extraocular eye movements intact  Ears - bilateral TM's and external ear canals normal  Nose - normal and patent, no erythema, discharge or polyps  Mouth - mucous membranes moist, pharynx normal without lesions  Neck - supple, no significant adenopathy  Chest - clear to auscultation, no wheezes, rales or rhonchi, symmetric air entry  Heart - normal rate, regular rhythm, normal S1, S2, no murmurs, rubs, clicks or gallops  Abdomen - soft, nontender, nondistended, no masses or organomegaly  Neurological - alert, oriented, normal speech, no focal findings or movement disorder noted  Extremities - peripheral pulses normal, no " pedal edema, no clubbing or cyanosis    Patient Instructions   Do not take metformin and lisinopril on morning of surgery    Stop vitamin D one week before surgery.  Restart after surgery    I recommend you see a cardiologist about your abnormal stress test    Bring CPAP to hospital    Try Debrox drops for earwax    Try taking a dose of gabapentin at noon        Labs:  Labs pending at this time.  Results will be reviewed when available.    Immunization History   Administered Date(s) Administered     Influenza high dose, seasonal 10/31/2016, 10/12/2017, 10/17/2018     Influenza, seasonal,quad inj 36+ mos 10/02/2015     Influenza, seasonal,quad inj 6-35 mos 09/28/2010, 08/28/2012, 10/07/2013     Pneumo Conj 13-V (2010&after) 05/11/2015     Pneumo Polysac 23-V 07/23/2010, 10/22/2018     Td, adult adsorbed, PF 02/13/2019     Tdap 01/01/2009     ZOSTER, LIVE 10/31/2016           Electronically signed by Bekah Castanon MD 04/01/19 10:03 AM

## 2021-05-27 NOTE — ANESTHESIA PREPROCEDURE EVALUATION
Anesthesia Evaluation      Patient summary reviewed     Airway   Mallampati: II  Neck ROM: limited   Pulmonary    (+) COPD, shortness of breath, sleep apnea, decreased breath sounds, wheezes,                          Cardiovascular   (+) hypertension, ,     Rhythm: regular        Neuro/Psych      Endo/Other    (+) diabetes mellitus,      GI/Hepatic/Renal       Other findings:       Obstructive Sleep Apnea  Male Erectile Disorder Due To Physical Condition  Mixed hyperlipidemia  Cataract  Lower Back Sprain  Benign Prostatic Hypertrophy With Urinary Obstruction  Hypertension  Cervicalgia  Simple chronic bronchitis (H)  Benign Prostatic Hypertrophy  Aneurysm Of The Abdominal Aorta  Vitamin D Deficiency  Exercise hypoxemia  Type 2 diabetes mellitus (H)  Back pain  AAA (abdominal aortic aneurysm) without rupture (H)    STRESS STUDY:    The exercise nuclear stress test is abnormal.    Stress nuclear images demonstrate a small area of mild distal anteroseptal ischemia.    The left ventricular ejection fraction is 65% without wall motion abnormality.    When compared to the images of 11/2/2017, a small area of distal anteroseptal ischemia is new.    The patient is at a low risk of future cardiac ischemic events.        Dental    (+) poor dentition and upper dentures                       Anesthesia Plan  Planned anesthetic: MAC      ASA 4     Anesthetic plan and risks discussed with: patient  Anesthesia plan special considerations: antiemetics,   Post-op plan: routine recovery

## 2021-05-27 NOTE — TELEPHONE ENCOUNTER
Refill Request  Did you contact pharmacy: Yes  Medication name: Bupropion 150 and Metformin 500mg   Requested Prescriptions        No prescriptions requested or ordered in this encounter      Who prescribed the medication: Dr. Castanon    Pharmacy Name and Location: [facility] Is patient out of medication: Yes  Patient notified refills processed in 72 hours:  yes  Okay to leave a detailed message: yes

## 2021-05-27 NOTE — TELEPHONE ENCOUNTER
Refill Approved    Rx renewed per Medication Renewal Policy. Medication was last renewed on 3/24/19.    Last office visit 4/1/19      Yamil Horvath, [facility] Triage/Med Refill 4/9/2019     Requested Prescriptions   Pending Prescriptions Disp Refills     buPROPion (WELLBUTRIN SR) 150 MG 12 hr tablet [Pharmacy Med Name: BUPROPION HYDROCHLORIDE ER (SR) 150 MG Tablet Extended Release 12 Hour] 180 tablet 2     Sig: TAKE 1 TABLET TWICE DAILY       Tricyclics/Misc Antidepressant/Antianxiety Meds Refill Protocol Passed - 4/8/2019  6:58 PM        Passed - PCP or prescribing provider visit in last year     Last office visit with prescriber/PCP: 2/13/2019 Bekah Castanon MD OR same dept: 2/13/2019 Bekah Castanon MD OR same specialty: 2/13/2019 Bekah Castanon MD  Last physical: 4/1/2019 Last MTM visit: Visit date not found   Next visit within 3 mo: Visit date not found  Next physical within 3 mo: Visit date not found  Prescriber OR PCP: Bekah Castanon MD  Last diagnosis associated with med order: 1. Encounter for smoking cessation counseling  - buPROPion (WELLBUTRIN SR) 150 MG 12 hr tablet [Pharmacy Med Name: BUPROPION HYDROCHLORIDE ER (SR) 150 MG Tablet Extended Release 12 Hour]; TAKE 1 TABLET TWICE DAILY  Dispense: 180 tablet; Refill: 2    2. Type 2 diabetes mellitus (H)  - metFORMIN (GLUCOPHAGE-XR) 500 MG 24 hr tablet [Pharmacy Med Name: METFORMIN HYDROCHLORIDE  MG Tablet Extended Release 24 Hour]; TAKE 2 TABLETS TWICE DAILY  Dispense: 360 tablet; Refill: 3    If protocol passes may refill for 12 months if within 3 months of last provider visit (or a total of 15 months).             metFORMIN (GLUCOPHAGE-XR) 500 MG 24 hr tablet [Pharmacy Med Name: METFORMIN HYDROCHLORIDE  MG Tablet Extended Release 24 Hour] 360 tablet 3     Sig: TAKE 2 TABLETS TWICE DAILY       Metformin Refill Protocol Passed - 4/8/2019  6:58 PM        Passed - Blood pressure in last 12 months     BP Readings from Last 1 Encounters:    04/09/19 139/85             Passed - LFT or AST or ALT in last 12 months     Albumin   Date Value Ref Range Status   04/01/2019 4.0 3.5 - 5.0 g/dL Final     Bilirubin, Total   Date Value Ref Range Status   04/01/2019 0.7 0.0 - 1.0 mg/dL Final     Bilirubin, Direct   Date Value Ref Range Status   04/01/2016 0.3 <=0.5 mg/dL Final     Alkaline Phosphatase   Date Value Ref Range Status   04/01/2019 68 45 - 120 U/L Final     AST   Date Value Ref Range Status   04/01/2019 19 0 - 40 U/L Final     ALT   Date Value Ref Range Status   04/01/2019 23 0 - 45 U/L Final     Protein, Total   Date Value Ref Range Status   04/01/2019 7.2 6.0 - 8.0 g/dL Final                Passed - GFR or Serum Creatinine in last 6 months     GFR MDRD Non Af Amer   Date Value Ref Range Status   04/09/2019 >60 >60 mL/min/1.73m2 Final     GFR MDRD Af Amer   Date Value Ref Range Status   04/09/2019 >60 >60 mL/min/1.73m2 Final             Passed - Visit with PCP or prescribing provider visit in last 6 months or next 3 months     Last office visit with prescriber/PCP: 2/13/2019 OR same dept: 2/13/2019 Bekah Castanon MD OR same specialty: 2/13/2019 Bekah Castanon MD Last physical: 4/1/2019 Last MTM visit: Visit date not found         Next appt within 3 mo: Visit date not found  Next physical within 3 mo: Visit date not found  Prescriber OR PCP: Bekah Castanon MD  Last diagnosis associated with med order: 1. Encounter for smoking cessation counseling  - buPROPion (WELLBUTRIN SR) 150 MG 12 hr tablet [Pharmacy Med Name: BUPROPION HYDROCHLORIDE ER (SR) 150 MG Tablet Extended Release 12 Hour]; TAKE 1 TABLET TWICE DAILY  Dispense: 180 tablet; Refill: 2    2. Type 2 diabetes mellitus (H)  - metFORMIN (GLUCOPHAGE-XR) 500 MG 24 hr tablet [Pharmacy Med Name: METFORMIN HYDROCHLORIDE  MG Tablet Extended Release 24 Hour]; TAKE 2 TABLETS TWICE DAILY  Dispense: 360 tablet; Refill: 3     If protocol passes may refill for 12 months if within 3 months of last  provider visit (or a total of 15 months).           Passed - A1C in last 6 months     Hemoglobin A1c   Date Value Ref Range Status   04/01/2019 7.1 (H) 3.5 - 6.0 % Final               Passed - Microalbumin in last year      Microalbumin, Random Urine   Date Value Ref Range Status   10/22/2018 <0.50 0.00 - 1.99 mg/dL Final

## 2021-05-27 NOTE — TELEPHONE ENCOUNTER
RN cannot approve Refill Request    RN can NOT refill this medication patient not taking as prescribed, deferred to provider.     Yamil Horvath, [facility] Triage/Med Refill 4/9/2019    Requested Prescriptions   Pending Prescriptions Disp Refills     metFORMIN (GLUCOPHAGE-XR) 500 MG 24 hr tablet [Pharmacy Med Name: METFORMIN HYDROCHLORIDE  MG Tablet Extended Release 24 Hour] 360 tablet 3     Sig: TAKE 2 TABLETS TWICE DAILY       Metformin Refill Protocol Passed - 4/8/2019  6:58 PM        Passed - Blood pressure in last 12 months     BP Readings from Last 1 Encounters:   04/09/19 139/85             Passed - LFT or AST or ALT in last 12 months     Albumin   Date Value Ref Range Status   04/01/2019 4.0 3.5 - 5.0 g/dL Final     Bilirubin, Total   Date Value Ref Range Status   04/01/2019 0.7 0.0 - 1.0 mg/dL Final     Bilirubin, Direct   Date Value Ref Range Status   04/01/2016 0.3 <=0.5 mg/dL Final     Alkaline Phosphatase   Date Value Ref Range Status   04/01/2019 68 45 - 120 U/L Final     AST   Date Value Ref Range Status   04/01/2019 19 0 - 40 U/L Final     ALT   Date Value Ref Range Status   04/01/2019 23 0 - 45 U/L Final     Protein, Total   Date Value Ref Range Status   04/01/2019 7.2 6.0 - 8.0 g/dL Final                Passed - GFR or Serum Creatinine in last 6 months     GFR MDRD Non Af Amer   Date Value Ref Range Status   04/09/2019 >60 >60 mL/min/1.73m2 Final     GFR MDRD Af Amer   Date Value Ref Range Status   04/09/2019 >60 >60 mL/min/1.73m2 Final             Passed - Visit with PCP or prescribing provider visit in last 6 months or next 3 months     Last office visit with prescriber/PCP: 2/13/2019 OR same dept: 2/13/2019 Bekah Castanon MD OR same specialty: 2/13/2019 Bekah Castanon MD Last physical: 4/1/2019 Last MTM visit: Visit date not found         Next appt within 3 mo: Visit date not found  Next physical within 3 mo: Visit date not found  Prescriber OR PCP: Bekah Castanon MD  Last  diagnosis associated with med order: 1. Encounter for smoking cessation counseling  - buPROPion (WELLBUTRIN SR) 150 MG 12 hr tablet; TAKE 1 TABLET TWICE DAILY  Dispense: 180 tablet; Refill: 1    2. Type 2 diabetes mellitus (H)  - metFORMIN (GLUCOPHAGE-XR) 500 MG 24 hr tablet [Pharmacy Med Name: METFORMIN HYDROCHLORIDE  MG Tablet Extended Release 24 Hour]; TAKE 2 TABLETS TWICE DAILY  Dispense: 360 tablet; Refill: 3     If protocol passes may refill for 12 months if within 3 months of last provider visit (or a total of 15 months).           Passed - A1C in last 6 months     Hemoglobin A1c   Date Value Ref Range Status   04/01/2019 7.1 (H) 3.5 - 6.0 % Final               Passed - Microalbumin in last year      Microalbumin, Random Urine   Date Value Ref Range Status   10/22/2018 <0.50 0.00 - 1.99 mg/dL Final                Signed Prescriptions Disp Refills    buPROPion (WELLBUTRIN SR) 150 MG 12 hr tablet 180 tablet 1     Sig: TAKE 1 TABLET TWICE DAILY       Tricyclics/Misc Antidepressant/Antianxiety Meds Refill Protocol Passed - 4/8/2019  6:58 PM        Passed - PCP or prescribing provider visit in last year     Last office visit with prescriber/PCP: 2/13/2019 Bekah Castanon MD OR same dept: 2/13/2019 Bekah Castanon MD OR same specialty: 2/13/2019 Bekah Castanon MD  Last physical: 4/1/2019 Last MTM visit: Visit date not found   Next visit within 3 mo: Visit date not found  Next physical within 3 mo: Visit date not found  Prescriber OR PCP: Bekah Castanon MD  Last diagnosis associated with med order: 1. Encounter for smoking cessation counseling  - buPROPion (WELLBUTRIN SR) 150 MG 12 hr tablet; TAKE 1 TABLET TWICE DAILY  Dispense: 180 tablet; Refill: 1    2. Type 2 diabetes mellitus (H)  - metFORMIN (GLUCOPHAGE-XR) 500 MG 24 hr tablet [Pharmacy Med Name: METFORMIN HYDROCHLORIDE  MG Tablet Extended Release 24 Hour]; TAKE 2 TABLETS TWICE DAILY  Dispense: 360 tablet; Refill: 3    If protocol passes may refill  for 12 months if within 3 months of last provider visit (or a total of 15 months).

## 2021-05-27 NOTE — PROGRESS NOTES
Assessment/Plan:     1. Abdominal aortic aneurysm (AAA) without rupture (H)     2. Type 2 diabetes mellitus with hyperglycemia, without long-term current use of insulin (H)  HM2(CBC w/o Differential)    Comprehensive Metabolic Panel    Glycosylated Hemoglobin A1c    Ambulatory referral to Diabetes Education Program (CDE)   3. Obstructive Sleep Apnea  Oximetry, overnight w/strip rcdr   4. Chronic obstructive pulmonary disease, unspecified COPD type (H)  Oximetry, overnight w/strip rcdr       Diagnoses and all orders for this visit:    Abdominal aortic aneurysm (AAA) without rupture (H)  Doing well postoperatively.  Recheck hemogram today.  He will follow-up with vascular surgeon as planned    Type 2 diabetes mellitus with hyperglycemia, without long-term current use of insulin (H)  -     HM2(CBC w/o Differential)  -     Comprehensive Metabolic Panel  -     Glycosylated Hemoglobin A1c  -     Ambulatory referral to Diabetes Education Program (CDE)  -Recommend referral to diabetic educator to discuss GLP-1 inhibitor.  Discussed this will help improve control of diabetes and will assist with weight loss.  Follow-up 3 months for diabetic check.  Blood pressure is controlled.  Continue aspirin and statin    Obstructive Sleep Apnea  -     Oximetry, overnight w/strip rcdr; Future  -Continue CPAP machine.  Order placed for home oxygen evaluation    Chronic obstructive pulmonary disease, unspecified COPD type (H)  -     Oximetry, overnight w/strip rcdr; Future  - Continue current inhalers.  Order placed for home oxygen evaluation.           Subjective:      Raf Dalton is a 68 y.o. male who comes in today for hospital follow-up visit.  He underwent endovascular repair of abdominal aortic aneurysm on April 9, 2019.  He was discharged on April 10, 2019.  Reviewed hospital records and discharge summary.  Surgery was uncomplicated and postop course is uneventful.  He did have some mild chest discomfort related to coughing  which resolved at time of discharge.  Serial troponins were negative.  At time of discharge she was tolerating regular diet and voiding and moving bowels normally.  He was requiring oxygen and was unable to wean off during hospital stay.  Chest x-ray showed no acute findings.  He does have underlying COPD and previously used oxygen at bedtime.  Repeat home oxygen evaluation was advised.  Patient is requesting a new prescription for oxygen tanks from [facility].  He reports that he is currently doing well since he has returned home.  He is tired.  He is using his CPAP machine.  Wound looks good.  Scheduled to see vascular surgeon on 5/8/2019.  He is eating and drinking normally.  Bowel movements are okay.  No increased dyspnea.  No lower extremity edema.  He is interested in losing weight.  Medication reconciliation performed.  Review of systems assessed and otherwise negative.    Current Outpatient Medications   Medication Sig Dispense Refill     ACCU-CHEK TORI Misc        ACCU-CHEK SMARTVIEW TEST STRIP strips TEST BLOOD SUGAR TWICE DAILY 200 strip 3     albuterol (PROAIR HFA;PROVENTIL HFA;VENTOLIN HFA) 90 mcg/actuation inhaler Inhale 2 puffs every 6 (six) hours as needed for wheezing. 1 each 6     aspirin 81 MG EC tablet Take 1 tablet (81 mg total) by mouth daily.  0     budesonide-formoterol (SYMBICORT) 80-4.5 mcg/actuation inhaler Inhale 2 puffs 2 (two) times a day. 1 Inhaler 12     buPROPion (WELLBUTRIN SR) 150 MG 12 hr tablet TAKE 1 TABLET TWICE DAILY 180 tablet 1     cholecalciferol, vitamin D3, (VITAMIN D3) 5,000 unit Tab Take 1 tablet (5,000 Units total) by mouth daily. 90 tablet 3     gabapentin (NEURONTIN) 300 MG capsule Take 2 capsules in the morning, 1 capsule in the afternoon and 3 capsules at night 540 capsule 3     generic lancets (ACCU-CHEK FASTCLIX) USE TO TEST TWICE DAILY 200 each 3     lisinopril (PRINIVIL,ZESTRIL) 20 MG tablet TAKE 1 TABLET TWICE DAILY FOR BLOOD PRESSURE 180 tablet 1     metFORMIN  (GLUCOPHAGE-XR) 500 MG 24 hr tablet TAKE 2 TABLETS TWICE DAILY 360 tablet 3     simvastatin (ZOCOR) 40 MG tablet TAKE 1 AND 1/2 TABLETS EVERY  tablet 3     terazosin (HYTRIN) 10 MG capsule Take 1 capsule (10 mg total) by mouth at bedtime. 90 capsule 3     tiotropium (SPIRIVA WITH HANDIHALER) 18 mcg inhalation capsule INHALE CONTENTS OF 1 CAPSULE DAILY 30 capsule 5     acetaminophen (TYLENOL) 500 MG tablet Take 500 mg by mouth as needed.       albuterol (PROVENTIL) 2.5 mg /3 mL (0.083 %) nebulizer solution Take 3 mL (2.5 mg total) by nebulization every 6 (six) hours as needed for wheezing or shortness of breath. 25 vial 2     dulaglutide (TRULICITY) 0.75 mg/0.5 mL PnIj Inject 0.75 mg under the skin every 7 days. 2 mL 11     fluticasone propionate (FLONASE) 50 mcg/actuation nasal spray 1 spray into each nostril 2 (two) times a day as needed. 16 g 11     inhalational spacing device Spcr Use two times a day with symbicort 1 each 0     testosterone (ANDROGEL) 1.62 % (40.5 mg/2.5 gram) GlPk Apply 1 pck daily 75 g 5     traZODone (DESYREL) 50 MG tablet Take 1 tablet (50 mg total) by mouth at bedtime as needed for sleep. 90 tablet 0     No current facility-administered medications for this visit.        Past Medical History, Family History, and Social History reviewed.  Past Medical History:   Diagnosis Date     Aneurysm Of The Abdominal Aorta     Created by Conversion      Benign Prostatic Hypertrophy     Created by Conversion      Benign Prostatic Hypertrophy With Urinary Obstruction     Created by Conversion      Cataract     Created by Conversion      Cervicalgia     Created by Conversion      Chronic Obstructive Pulmonary Disease     Created by Conversion      Diabetes mellitus, type II (H)      High cholesterol      Hyperglycemia     Created by Conversion      Hyperlipidemia     Created by Conversion      Hypertension     Created by Conversion      Hypoxia     Created by Conversion      Lower Back Sprain      Created by Conversion      Obstructive Sleep Apnea     Created by Conversion      Shortness of breath      Vitamin D Deficiency     Created by Conversion      Past Surgical History:   Procedure Laterality Date     FINGER SURGERY       Patient has no known allergies.  Family History   Problem Relation Age of Onset     Snoring Father      Social History     Socioeconomic History     Marital status:      Spouse name: Not on file     Number of children: Not on file     Years of education: Not on file     Highest education level: Not on file   Occupational History     Not on file   Social Needs     Financial resource strain: Not on file     Food insecurity:     Worry: Not on file     Inability: Not on file     Transportation needs:     Medical: Not on file     Non-medical: Not on file   Tobacco Use     Smoking status: Former Smoker     Packs/day: 1.50     Years: 55.00     Pack years: 82.50     Types: Cigarettes     Last attempt to quit: 10/18/2017     Years since quittin.5     Smokeless tobacco: Never Used   Substance and Sexual Activity     Alcohol use: Yes     Alcohol/week: 12.6 oz     Types: 21 Cans of beer per week     Comment: 21 beers per week     Drug use: No     Sexual activity: Never     Partners: Female     Birth control/protection: None   Lifestyle     Physical activity:     Days per week: Not on file     Minutes per session: Not on file     Stress: Not on file   Relationships     Social connections:     Talks on phone: Not on file     Gets together: Not on file     Attends [employer] service: Not on file     Active member of club or organization: Not on file     Attends meetings of clubs or organizations: Not on file     Relationship status: Not on file     Intimate partner violence:     Fear of current or ex partner: Not on file     Emotionally abused: Not on file     Physically abused: Not on file     Forced sexual activity: Not on file   Other Topics Concern     Not on file   Social History  Narrative     Not on file         Review of systems is as stated in HPI, and the remainder of the 10 system review is otherwise negative.    Objective:     Vitals:    04/17/19 0857   BP: 118/62   Patient Site: Left Arm   Patient Position: Sitting   Cuff Size: Adult Large   Pulse: 71   Temp: 98.1  F (36.7  C)   TempSrc: Oral   SpO2: 99%   Weight: (!) 231 lb 4 oz (104.9 kg)    Body mass index is 34.65 kg/m .        General appearance: alert, appears stated age and cooperative  Head: Normocephalic, without obvious abnormality, atraumatic  Lungs: clear to auscultation bilaterally  Heart: regular rate and rhythm, S1, S2 normal, no murmur, click, rub or gallop  Extremities: extremities normal, atraumatic, no cyanosis or edema      This note has been dictated using voice recognition software. Any grammatical or context distortions are unintentional and inherent to the the software.

## 2021-05-27 NOTE — TELEPHONE ENCOUNTER
Pt has surgery on 4/9/19 and discharged on 4/10/19.  Pt states he does not feel he is having as much stool as he normally does, pt is fine otherwise, no vomiting.  Pt hydrated.  Pt is taking stool softener and laxative, pt is have daily bowel movements.  Pt intends to monitor and follow Care Advice and will f/u with clinic if needed.    Denise Medrano RN, MA  [facility] RN Triage

## 2021-05-27 NOTE — PROGRESS NOTES
TCM DISCHARGE FOLLOW UP CALL    Discharge Date:  4/10/2019  Reason for hospital stay (discharge diagnosis)::  AAA Repair  Are you feeling better, the same or worse since your discharge?:  Patient is feeling better (A little sore but doing well - Tylenol helps.  A lot of bruising in the groin.)  Do you feel like you have a plan in the event of a health emergency?: Yes (Call clinic, 911.  Pt aware of nurse triage)    As part of your discharge plan, were  home care services ordered for you?: No    Did you receive any new medications, or was there a change to your medications?: Yes    Are you taking those medications, or do you have any established regiment?:  RN reviewed discharge medications w/pt.  Pt states he is taking metformin 500 mg 24 hr tabs, 2 tabs twice a day, as prescribed.  Taking all medications as prescribed.  MTM phone f/u appt w/Thomas Wood PharmD 4/17/19.  Do you have any follow up visits scheduled with your PCP or Specialist?:  Yes, with PCP and Yes, with Specialist (Dr. Castanon 4/17/19)  (RN) Is PCP appt scheduled soon enough (within 14 days of discharge date)?: Yes    Who are you seeing and when is it scheduled?:  Dr. Brian (vascular surgery) 5/8/19      Mary Blanco RN Care Manager, Population Health

## 2021-05-28 NOTE — TELEPHONE ENCOUNTER
Medication Question or Clarification  Who is calling: Patient  What medication are you calling about? (include dose and sig)   Outpatient Medication Detail      Disp Refills Start End    dulaglutide (TRULICITY) 0.75 mg/0.5 mL PnIj 2 mL 11 4/19/2019     Sig - Route: Inject 0.75 mg under the skin every 7 days. - Subcutaneous    Sent to pharmacy as: dulaglutide (TRULICITY) 0.75 mg/0.5 mL PnIj    Notes to Pharmacy: Looking into coverage    E-Prescribing Status: Receipt confirmed by pharmacy (4/19/2019  8:37 AM CDT)      Who prescribed the medication?: pcp  What is your question/concern?:Patient wants to talk to Thomas to make sure he takes this medication correctly. Patient is asking for Thomas to call him today.  Pharmacy: [facility]  Okay to leave a detailed message?: Yes  Site CMT - Please call the pharmacy to obtain any additional needed information.

## 2021-05-28 NOTE — TELEPHONE ENCOUNTER
Who is calling:  Patient  Reason for Call:  Patient stated he is having issues with the Trulicity pen again. Patient would like Jersey GuzmanD, to call him back.  Date of last appointment with primary care: 4/17/19  Okay to leave a detailed message: No  [phone]

## 2021-05-28 NOTE — TELEPHONE ENCOUNTER
Who is requesting the letter?  Patient  Why is the letter needed? Patient stated he would like a letter to help him keep his dog for emotional support. Patient stated he will be moving soon and would like to have letter on record to give to his new apartment landlord.  How would you like this letter returned? Mail to the patient  Okay to leave a detailed message? Yes  [phone]

## 2021-05-28 NOTE — TELEPHONE ENCOUNTER
Who is calling:  Patient   Reason for Call:  Calling to speak w/ Renny in Pharm D in regards to Slidenafil 20 mg. Patient states his Rx states:   Take 1- 5 Tabs po and he is unsure if that is correct. He would like to discuss.  Please advise   Date of last appointment with primary care: 04/17/19  Okay to leave a detailed message: Yes

## 2021-05-28 NOTE — PROGRESS NOTES
MTM Consult Encounter    Raf Dalton is a 68 y.o. male referred for a clinical pharmacist consult from Dr. Castanon for Trulicity education.    Discussion: As discussed during transitions of care visit with Raf, and per discussion with Dr. Castanon, he will be started on GLP-1 agonist.  Previously verified insurance, and prescribed a Trulicity 0.75 mg once weekly.  He is here to learn how to administer the medication.  Provided education on mechanism of action, expected effects, how to administer an store.  He demonstrates understanding with no further questions.  He already has an appointment with diabetes education in approximately 1 month for follow-up.  Discussed that at that time Marisa will be able to assess whether he should continue on 0.75 mg, or increased to 1.5 mg.  He would prefer his next prescription to go to mail order due to cost.    Plan:  1.  Educated on how to administer Trulicity 0.75 mg weekly    Follow up:   1 month with diabetes education    Thomas Wood, PharmD, BCACP  Medication Management (MTM) Pharmacist  [facility]      Current Outpatient Medications   Medication Sig Dispense Refill     ACCU-CHEK TORI Misc        ACCU-CHEK SMARTVIEW TEST STRIP strips TEST BLOOD SUGAR TWICE DAILY 200 strip 3     acetaminophen (TYLENOL) 500 MG tablet Take 500 mg by mouth as needed.       albuterol (PROAIR HFA;PROVENTIL HFA;VENTOLIN HFA) 90 mcg/actuation inhaler Inhale 2 puffs every 6 (six) hours as needed for wheezing. 1 each 6     albuterol (PROVENTIL) 2.5 mg /3 mL (0.083 %) nebulizer solution Take 3 mL (2.5 mg total) by nebulization every 6 (six) hours as needed for wheezing or shortness of breath. 25 vial 2     aspirin 81 MG EC tablet Take 1 tablet (81 mg total) by mouth daily.  0     budesonide-formoterol (SYMBICORT) 80-4.5 mcg/actuation inhaler Inhale 2 puffs 2 (two) times a day. 1 Inhaler 12     buPROPion (WELLBUTRIN SR) 150 MG 12 hr tablet TAKE 1 TABLET TWICE DAILY 180  tablet 1     cholecalciferol, vitamin D3, (VITAMIN D3) 5,000 unit Tab Take 1 tablet (5,000 Units total) by mouth daily. 90 tablet 3     dulaglutide (TRULICITY) 0.75 mg/0.5 mL PnIj Inject 0.75 mg under the skin every 7 days. 2 mL 11     fluticasone propionate (FLONASE) 50 mcg/actuation nasal spray 1 spray into each nostril 2 (two) times a day as needed. 16 g 11     gabapentin (NEURONTIN) 300 MG capsule Take 2 capsules in the morning, 1 capsule in the afternoon and 3 capsules at night 540 capsule 3     generic lancets (ACCU-CHEK FASTCLIX) USE TO TEST TWICE DAILY 200 each 3     inhalational spacing device Spcr Use two times a day with symbicort 1 each 0     lisinopril (PRINIVIL,ZESTRIL) 20 MG tablet TAKE 1 TABLET TWICE DAILY FOR BLOOD PRESSURE 180 tablet 1     metFORMIN (GLUCOPHAGE-XR) 500 MG 24 hr tablet TAKE 2 TABLETS TWICE DAILY 360 tablet 3     simvastatin (ZOCOR) 40 MG tablet TAKE 1 AND 1/2 TABLETS EVERY  tablet 3     terazosin (HYTRIN) 10 MG capsule Take 1 capsule (10 mg total) by mouth at bedtime. 90 capsule 3     testosterone (ANDROGEL) 1.62 % (40.5 mg/2.5 gram) GlPk Apply 1 pck daily 75 g 5     tiotropium (SPIRIVA WITH HANDIHALER) 18 mcg inhalation capsule INHALE CONTENTS OF 1 CAPSULE DAILY 30 capsule 5     traZODone (DESYREL) 50 MG tablet Take 1 tablet (50 mg total) by mouth at bedtime as needed for sleep. 90 tablet 0     No current facility-administered medications for this visit.

## 2021-05-28 NOTE — PROGRESS NOTES
Patient status post endovascular aortic aneurysm repair performed because of high pulmonary risk for open repair.  Operation uneventful and has been doing extremely well at home.  Noticed fairly significant bruising in his left groin that resolved but no real discomfort.  Still successfully quit smoking for the last several months.    On statin and aspirin.    Vitals:    05/08/19 1352   BP: 138/82   Pulse: 80   Resp: 16     CTA: Good positioning of the endograft.  No endo-leaks whatsoever.    Impression and plan: Return visit in 1 year time with repeat CTA.  Good prognosis with aneurysm repair.

## 2021-05-28 NOTE — TELEPHONE ENCOUNTER
Attempted to reach patient, I left him a voicemail stating that if he has problems with the pen he can go to his local pharmacy which is close to his home, and the pharmacist there can help explain how to administer the medication or address his questions.

## 2021-05-28 NOTE — TELEPHONE ENCOUNTER
Spoke with Raf and answered his questions, he wanted to make sure he was taking it correctly. He will start this today.

## 2021-05-29 NOTE — PROGRESS NOTES
Assessment: Anthony is here alone today for diabetes education.  He was originally referred about 1 month ago to discuss Trulicity and review how medication is used.    He has been using Trulicity 0.75 mg weekly for about 4 weeks now.  Stated he has had no side effects of nausea, vomiting, abdominal pain or heartburn.  He is also taking metformin extended release 1000 mg twice daily.    He is checking his blood sugars fasting daily.  Stated he has noticed over the last several weeks that his readings have come down.  Before starting Trulicity his fasting blood sugars were in the 150s.  Over the last week his readings have been in the lower 100s, with fasting blood sugar today being 114.    Anthony currently is not active.  Stated he spends most of his time taking care of his wife, who is a double amputee.  Stated she cannot be left alone, she does have an aide that comes in a couple times a week.  There is a community center about 2 blocks from his house, recommended he check into using the Silver sneakers program through this.  Stated he will consider it.    He is eating 2 meals daily, breakfast and dinner.  B-hash, eggs berger ham toast  Cereal-corn flakes  Raisin bran  D-some kind of meat with a starch and sometimes a vegetable      Plan: Anthony has agreed to check in to the fitness center down the street from him and see if they excepts the Silver sneakers program.  Also agreed to work on eating 3 meals a day and adding protein to his breakfast.    Subjective and Objective:      Raf Dalton is referred by Dr Bekah Castanon for Diabetes Education.     Lab Results   Component Value Date    HGBA1C 7.0 (H) 04/17/2019       Follow up:   Primary care visit      Education:     Monitoring   Meter (per above goals): Assessed and Discussed  Monitoring: Assessed and Discussed  BG goals: Assessed and Discussed    Nutrition Management  Nutrition Management: Assessed and Discussed  Weight: Assessed and Discussed  Portions/Balance:  Assessed and Discussed  Carb ID/Count: Assessed and Discussed  Label Reading: Assessed and Discussed  Heart Healthy Fats: Assessed and Discussed  Menu Planning: Assessed and Discussed  Dining Out: Not addressed  Physical Activity: Assessed and Discussed  Medications: Assessed and Discussed  Orals: Assessed and Discussed  Injected Medications: Assessed and Discussed   Storage/Exp:Assessed and Discussed   Site Rotation: Assessed and Discussed   Sites Assessed: no    Diabetes Disease Process: Assessed and Discussed    Acute Complications: Prevent, Detect, Treat:  Hypoglycemia: Assessed and Discussed  Hyperglycemia: Assessed and Discussed  Sick Days: Assessed and Discussed  Driving: Not addressed    Chronic Complications  Foot Care:Not addressed  Skin Care: Not addressed  Eye: Not addressed  ABC: Not addressed  Teeth:Not addressed  Goal Setting and Problem Solving: Assessed and Discussed  Barriers: Assessed and Discussed  Psychosocial Adjustments: Assessed and Discussed      Time spent with the patient: 60 minutes for diabetes education and counseling.   Previous Education: yes  Visit Type:DSMT  Hours Remaining: DSMT 1 and MNT 2      Doris Knowles  5/28/2019

## 2021-05-29 NOTE — TELEPHONE ENCOUNTER
Refill Approved    Rx renewed per Medication Renewal Policy. Medication was last renewed on 10/9/18.    Last office visit 4/17/19    Yamil Horvath, [facility] Triage/Med Refill 5/30/2019     Requested Prescriptions   Pending Prescriptions Disp Refills     lisinopril (PRINIVIL,ZESTRIL) 20 MG tablet [Pharmacy Med Name: LISINOPRIL 20 MG Tablet] 180 tablet 1     Sig: TAKE 1 TABLET TWICE DAILY FOR BLOOD PRESSURE       Ace Inhibitors Refill Protocol Passed - 5/29/2019  2:00 PM        Passed - PCP or prescribing provider visit in past 12 months       Last office visit with prescriber/PCP: 4/17/2019 Bekah Castanon MD OR same dept: 4/17/2019 Bekah Castanon MD OR same specialty: 4/17/2019 Bekah Castanon MD  Last physical: 4/1/2019 Last MTM visit: Visit date not found   Next visit within 3 mo: Visit date not found  Next physical within 3 mo: Visit date not found  Prescriber OR PCP: Bekah Castanon MD  Last diagnosis associated with med order: 1. Essential hypertension  - lisinopril (PRINIVIL,ZESTRIL) 20 MG tablet [Pharmacy Med Name: LISINOPRIL 20 MG Tablet]; TAKE 1 TABLET TWICE DAILY FOR BLOOD PRESSURE  Dispense: 180 tablet; Refill: 1    If protocol passes may refill for 12 months if within 3 months of last provider visit (or a total of 15 months).             Passed - Serum Potassium in past 12 months     Lab Results   Component Value Date    Potassium 4.6 04/17/2019             Passed - Blood pressure filed in past 12 months     BP Readings from Last 1 Encounters:   05/08/19 138/82             Passed - Serum Creatinine in past 12 months     Creatinine   Date Value Ref Range Status   04/17/2019 0.97 0.70 - 1.30 mg/dL Final

## 2021-05-30 NOTE — TELEPHONE ENCOUNTER
Refill Approved    Rx renewed per Medication Renewal Policy. Medication was last renewed on 5/21/18.    Nell Angel, [facility] Triage/Med Refill 7/2/2019     Requested Prescriptions   Pending Prescriptions Disp Refills     ACCU-CHEK SMARTVIEW TEST STRIP strips [Pharmacy Med Name: ACCU-CHEK SMARTVIEW STRIPS   Strip] 200 strip 3     Sig: TEST BLOOD SUGAR TWICE DAILY       Diabetic Supplies Refill Protocol Passed - 7/1/2019  3:50 PM        Passed - Visit with PCP or prescribing provider visit in last 6 months     Last office visit with prescriber/PCP: 4/17/2019 Bekah Castanon MD OR same dept: 4/17/2019 Bekah Castanon MD OR same specialty: 4/17/2019 Bekah Castanon MD  Last physical: 4/1/2019 Last MTM visit: Visit date not found   Next visit within 3 mo: Visit date not found  Next physical within 3 mo: Visit date not found  Prescriber OR PCP: Bekah Castanon MD  Last diagnosis associated with med order: 1. Diabetes mellitus type 2, uncontrolled (H)  - ACCU-CHEK SMARTVIEW TEST STRIP strips [Pharmacy Med Name: ACCU-CHEK SMARTVIEW STRIPS   Strip]; TEST BLOOD SUGAR TWICE DAILY  Dispense: 200 strip; Refill: 3    If protocol passes may refill for 12 months if within 3 months of last provider visit (or a total of 15 months).             Passed - A1C in last 6 months     Hemoglobin A1c   Date Value Ref Range Status   04/17/2019 7.0 (H) 3.5 - 6.0 % Final

## 2021-05-31 NOTE — TELEPHONE ENCOUNTER
----- Message from Bekah Castanon MD sent at 8/23/2019  4:49 PM CDT -----  Please let pt know A1c has improved to 6.2%

## 2021-05-31 NOTE — TELEPHONE ENCOUNTER
Refill Approved    Rx renewed per Medication Renewal Policy. Medication was last renewed on 4/9/19.    Nell Angel, [facility] Triage/Med Refill 8/27/2019     Requested Prescriptions   Pending Prescriptions Disp Refills     buPROPion (WELLBUTRIN SR) 150 MG 12 hr tablet [Pharmacy Med Name: BUPROPION HYDROCHLORIDE ER (SR) 150 MG Tablet Extended Release 12 Hour] 180 tablet 1     Sig: TAKE 1 TABLET TWICE DAILY       Tricyclics/Misc Antidepressant/Antianxiety Meds Refill Protocol Passed - 8/26/2019  7:24 PM        Passed - PCP or prescribing provider visit in last year     Last office visit with prescriber/PCP: 8/23/2019 Bekah Castanon MD OR same dept: 8/23/2019 Bekah Castanon MD OR same specialty: 8/23/2019 Bekah Castanon MD  Last physical: 4/1/2019 Last MTM visit: Visit date not found   Next visit within 3 mo: Visit date not found  Next physical within 3 mo: Visit date not found  Prescriber OR PCP: Bekah Castanon MD  Last diagnosis associated with med order: 1. Encounter for smoking cessation counseling  - buPROPion (WELLBUTRIN SR) 150 MG 12 hr tablet [Pharmacy Med Name: BUPROPION HYDROCHLORIDE ER (SR) 150 MG Tablet Extended Release 12 Hour]; TAKE 1 TABLET TWICE DAILY  Dispense: 180 tablet; Refill: 1    If protocol passes may refill for 12 months if within 3 months of last provider visit (or a total of 15 months).

## 2021-05-31 NOTE — TELEPHONE ENCOUNTER
Who is calling:  Patient   Reason for Call:  Patient stated I need to speak with Bekah Brown MD nurse.  I want to speak to only Francia about a Rx I think I really need, please have her return my call or speak to my wife.   Date of last appointment with primary care: 8/23/19  Okay to leave a detailed message: Yes

## 2021-05-31 NOTE — TELEPHONE ENCOUNTER
Pt notified of results, very happy to hear results are better than a few months ago. No other questions.

## 2021-05-31 NOTE — PROGRESS NOTES
"TCM DISCHARGE FOLLOW UP CALL    Discharge Date:  8/20/2019  Reason for hospital stay (discharge diagnosis)::  Chest Pain  Are you feeling better, the same or worse since your discharge?:  Patient is feeling better  Do you feel like you have a plan in the event of a health emergency?: Yes (Call triage nurse)    As part of your discharge plan, were  home care services ordered for you?: No    Did you receive any new medications, or was there a change to your medications?: Yes    Are you taking those medications, or do you have any established regiment?:  RN reviewed discharge medications w/pt.  Pt states he has been taking 3 caps (900mg total) twice a day, \"doing that for quite some time, per Dr. Castanon.\"  Do you have any follow up visits scheduled with your PCP or Specialist?:  Yes, with PCP  (RN) Is PCP appt scheduled soon enough (within 14 days of discharge date)?: Yes (Dr. Castanon 8/23/19)        Mary Blanco RN Care Manager, Population Health    "

## 2021-05-31 NOTE — TELEPHONE ENCOUNTER
"Pt reports \"chest pain -> L side.\"  Onset \"about 3 hours ago\" -> \"continuous.\"  Took pepto bismol with some benefit.  Pain is \"sharp, like pin pricks.\"  \"Also feeling a little bit of dizziness -> \"never had that before.\"  Advised immediate ED eval.  Pt verbalizes understanding.  Agrees to plan.  Reports having a friend who can drive him now.  Otherwise discussed calling 911 if no ability to immediately reach friend for transport.    Marie Maguire RN BSBA  [facility] RN Triage     Reason for Disposition    Chest pain lasting longer than 5 minutes and ANY of the following:* Over 50 years old* Over 30 years old and at least one cardiac risk factor (i.e., high blood pressure, diabetes, high cholesterol, obesity, smoker or strong family history of heart disease)* Pain is crushing, pressure-like, or heavy * Took nitroglycerin and chest pain was not relieved* History of heart disease (i.e., angina, heart attack, bypass surgery, angioplasty, CHF)    Protocols used: CHEST PAIN-A-OH      "

## 2021-05-31 NOTE — PROGRESS NOTES
Assessment/Plan:     1. Chest wall pain     2. Type 2 diabetes mellitus with hyperglycemia, without long-term current use of insulin (H)  Glycosylated Hemoglobin A1c   3. Hypertension     4. Mixed hyperlipidemia     5. Obstructive Sleep Apnea         Diagnoses and all orders for this visit:    Chest wall pain  This has resolved.  It was secondary to laying across his CPAP hose.    Type 2 diabetes mellitus with hyperglycemia, without long-term current use of insulin (H)  -     Glycosylated Hemoglobin A1c  -A1c is well controlled.  Blood pressure is controlled.  Continue aspirin and statin.  Continue current dose of Trulicity and metformin.  Continue lisinopril.  Follow-up in 6 months.  Continue with weight loss efforts.  Continue with healthy diet and regular exercise    Hypertension  Controlled with current medication    Mixed hyperlipidemia  Continue statin    Obstructive Sleep Apnea  Compliant with CPAP             The following high BMI interventions were performed this visit: encouragement to exercise    Subjective:      Raf Dalton is a 69 y.o. male who comes in today for hospital follow-up visit.  He was admitted at [facility] August 19 through August 20 due to episode of chest pain.  Ultimately, it was determined that the pain was caused by sleeping on the hose of his CPAP machine.  He has a apnea and uses his CPAP machine nightly.  Describes how there is a connector device that lays across the left center of his chest.  He believes that during the night he somehow rolled over and was sleeping on this connector.  He did not realize it and the following morning started experiencing some pain in the left chest area.  He presented to the emergency department for further evaluation.  Initial EKG did not show any ischemic changes.  Troponin was negative.  He was admitted.  Serial troponins remained negative.  He underwent a CT of the chest which was negative for pulmonary embolism.  Previously  known pulmonary nodules appeared stable in appearance.  He was discharged to home.  Reports that his pain resolved in the hospital.  No medication changes were made.  Medication reconciliation is performed.  He is currently feeling well.  He is due for a follow-up on his diabetes.  We reviewed hospital records as well as imaging and lab results.  He reports that he is otherwise doing well and feels well and has no questions.  Review of systems is assessed and is otherwise negative.    Current Outpatient Medications   Medication Sig Dispense Refill     ACCU-CHEK TORI Misc        ACCU-CHEK SMARTVIEW TEST STRIP strips TEST BLOOD SUGAR TWICE DAILY 200 strip 3     acetaminophen (TYLENOL) 500 MG tablet Take 500 mg by mouth as needed.       albuterol (PROAIR HFA;PROVENTIL HFA;VENTOLIN HFA) 90 mcg/actuation inhaler Inhale 2 puffs every 6 (six) hours as needed for wheezing. 1 each 6     aspirin 81 MG EC tablet Take 1 tablet (81 mg total) by mouth daily.  0     budesonide-formoterol (SYMBICORT) 80-4.5 mcg/actuation inhaler Inhale 2 puffs 2 (two) times a day. 1 Inhaler 12     buPROPion (WELLBUTRIN SR) 150 MG 12 hr tablet TAKE 1 TABLET TWICE DAILY 180 tablet 1     cholecalciferol, vitamin D3, (VITAMIN D3) 5,000 unit Tab Take 1 tablet (5,000 Units total) by mouth daily. 90 tablet 3     dulaglutide (TRULICITY) 0.75 mg/0.5 mL PnIj Inject 0.75 mg under the skin every 7 days. 6 mL 3     fluticasone propionate (FLONASE) 50 mcg/actuation nasal spray 1 spray into each nostril 2 (two) times a day as needed. 16 g 11     gabapentin (NEURONTIN) 300 MG capsule Take 2 capsules in the morning, 1 capsule in the afternoon and 3 capsules at night (Patient taking differently: Take 900 mg by mouth 2 (two) times a day.       ) 540 capsule 3     generic lancets (ACCU-CHEK FASTCLIX) USE TO TEST TWICE DAILY 200 each 3     inhalational spacing device Spcr Use two times a day with symbicort 1 each 0     lisinopril (PRINIVIL,ZESTRIL) 20 MG tablet TAKE 1  TABLET TWICE DAILY FOR BLOOD PRESSURE 180 tablet 1     metFORMIN (GLUCOPHAGE-XR) 500 MG 24 hr tablet TAKE 2 TABLETS TWICE DAILY 360 tablet 3     sildenafil (REVATIO) 20 mg tablet Take by mouth as needed.         3     simvastatin (ZOCOR) 40 MG tablet TAKE 1 AND 1/2 TABLETS EVERY  tablet 3     terazosin (HYTRIN) 10 MG capsule Take 1 capsule (10 mg total) by mouth at bedtime. 90 capsule 3     testosterone (ANDROGEL) 1.62 % (40.5 mg/2.5 gram) GlPk Apply 1 pck daily (Patient taking differently: as needed. Apply 1 pck daily      ) 75 g 5     tiotropium (SPIRIVA WITH HANDIHALER) 18 mcg inhalation capsule INHALE CONTENTS OF 1 CAPSULE DAILY 30 capsule 5     traZODone (DESYREL) 50 MG tablet Take 1 tablet (50 mg total) by mouth at bedtime as needed for sleep. 90 tablet 0     albuterol (PROVENTIL) 2.5 mg /3 mL (0.083 %) nebulizer solution Take 3 mL (2.5 mg total) by nebulization every 6 (six) hours as needed for wheezing or shortness of breath. 25 vial 2     No current facility-administered medications for this visit.        Past Medical History, Family History, and Social History reviewed.  Past Medical History:   Diagnosis Date     Aneurysm Of The Abdominal Aorta     Created by Conversion      Benign Prostatic Hypertrophy     Created by Conversion      Benign Prostatic Hypertrophy With Urinary Obstruction     Created by Conversion      Cataract     Created by Conversion      Cervicalgia     Created by Conversion      Chest pain 08/19/2019     Chronic Obstructive Pulmonary Disease     Created by Conversion      Diabetes mellitus, type II (H)      High cholesterol      Hyperglycemia     Created by Conversion      Hyperlipidemia     Created by Conversion      Hypertension     Created by Conversion      Hypoxia     Created by Conversion      Lower Back Sprain     Created by Conversion      Obstructive Sleep Apnea     Created by Conversion      Shortness of breath      Vitamin D Deficiency     Created by Conversion       Past Surgical History:   Procedure Laterality Date     ABDOMINAL AORTIC ANEURYSM REPAIR  2019     FINGER SURGERY       Patient has no known allergies.  Family History   Problem Relation Age of Onset     Snoring Father      Social History     Socioeconomic History     Marital status:      Spouse name: Not on file     Number of children: Not on file     Years of education: Not on file     Highest education level: Not on file   Occupational History     Not on file   Social Needs     Financial resource strain: Not on file     Food insecurity:     Worry: Not on file     Inability: Not on file     Transportation needs:     Medical: Not on file     Non-medical: Not on file   Tobacco Use     Smoking status: Former Smoker     Packs/day: 1.50     Years: 55.00     Pack years: 82.50     Types: Cigarettes     Last attempt to quit: 10/18/2017     Years since quittin.8     Smokeless tobacco: Never Used   Substance and Sexual Activity     Alcohol use: Yes     Alcohol/week: 12.6 oz     Types: 21 Cans of beer per week     Comment: 21 beers per week     Drug use: No     Sexual activity: Never     Partners: Female     Birth control/protection: None   Lifestyle     Physical activity:     Days per week: Not on file     Minutes per session: Not on file     Stress: Not on file   Relationships     Social connections:     Talks on phone: Not on file     Gets together: Not on file     Attends Zoroastrian service: Not on file     Active member of club or organization: Not on file     Attends meetings of clubs or organizations: Not on file     Relationship status: Not on file     Intimate partner violence:     Fear of current or ex partner: Not on file     Emotionally abused: Not on file     Physically abused: Not on file     Forced sexual activity: Not on file   Other Topics Concern     Not on file   Social History Narrative     Not on file         Review of systems is as stated in HPI, and the remainder of the 10 system  "review is otherwise negative.    Objective:     Vitals:    08/23/19 1346   BP: 118/68   Patient Site: Right Arm   Patient Position: Sitting   Cuff Size: Adult Large   Pulse: 91   Temp: 98.1  F (36.7  C)   TempSrc: Oral   SpO2: 98%   Weight: (!) 233 lb (105.7 kg)   Height: 5' 9\" (1.753 m)    Body mass index is 34.41 kg/m .        General appearance: alert, appears stated age and cooperative  Head: Normocephalic, without obvious abnormality, atraumatic  Lungs: clear to auscultation bilaterally  Heart: regular rate and rhythm, S1, S2 normal, no murmur, click, rub or gallop  Extremities: extremities normal, atraumatic, no cyanosis or edema  MSK: no tenderness over anterior chest wall      This note has been dictated using voice recognition software. Any grammatical or context distortions are unintentional and inherent to the the software.       "

## 2021-05-31 NOTE — TELEPHONE ENCOUNTER
Received MTM referral from [facility]     Patient was not reachable after several attempts, will route to MTM Pharmacist/Provider as an FYI. Left MTM scheduling information on patients voicemail.    Thank you for the referral,    Berkley Seay, MTM Coordinator

## 2021-06-01 NOTE — TELEPHONE ENCOUNTER
Who is calling:  patient  Reason for Call:  Calling to check on below request. Patient reports he takes he 3 pills in the morning, 2 pills Midday and 3 pills at night. Please send a new prescription asap!  Date of last appointment with primary care: 8/23/19  Okay to leave a detailed message: Yes

## 2021-06-01 NOTE — TELEPHONE ENCOUNTER
Refill Approved    Rx renewed per Medication Renewal Policy. Medication was last renewed on 4/17/19.    Nell Angel, [facility] Triage/Med Refill 9/30/2019     Requested Prescriptions   Pending Prescriptions Disp Refills     traZODone (DESYREL) 50 MG tablet [Pharmacy Med Name: TRAZODONE 50MG TABLETS] 90 tablet 0     Sig: TAKE 1 TABLET(50 MG) BY MOUTH AT BEDTIME AS NEEDED FOR SLEEP       Tricyclics/Misc Antidepressant/Antianxiety Meds Refill Protocol Passed - 9/30/2019 12:23 PM        Passed - PCP or prescribing provider visit in last year     Last office visit with prescriber/PCP: 8/23/2019 Bekah Castanon MD OR same dept: 8/23/2019 Bekah Castanon MD OR same specialty: 8/23/2019 Bekah Castanon MD  Last physical: 4/1/2019 Last MTM visit: Visit date not found   Next visit within 3 mo: Visit date not found  Next physical within 3 mo: Visit date not found  Prescriber OR PCP: Bekah Castanon MD  Last diagnosis associated with med order: There are no diagnoses linked to this encounter.  If protocol passes may refill for 12 months if within 3 months of last provider visit (or a total of 15 months).

## 2021-06-01 NOTE — TELEPHONE ENCOUNTER
OK to increase terazosin to 20 mg daily.  Side effects of the higher dose may be dizziness when standing up so he should monitor for this. If this is not helpful, I recommend he follow up with his urologist. Last saw urology in April 2019

## 2021-06-01 NOTE — TELEPHONE ENCOUNTER
Pt notified ok to increase the Terazosin to 20 mg daily, advised it may cause dizziness upon standing. Pt will try this for a couple weeks and if no help he will schedule an appointment with his urologist. No there questions.

## 2021-06-01 NOTE — TELEPHONE ENCOUNTER
Spoke with Anthony, states he is having increased urine leakage. He is taking Terazosin 10 mg capsules at bedtime. This is becoming very bothersome and is wondering if there is another medication he can try or increase the dosage of the Terazosin. States  he is confident this is not a UTI as this has  been going on for several years.

## 2021-06-01 NOTE — TELEPHONE ENCOUNTER
Please call pt to verify what his current dose of gabapentin is so that I may refill medication. Multiple directions listed in chart

## 2021-06-01 NOTE — TELEPHONE ENCOUNTER
RN cannot approve Refill Request    RN can NOT refill this medication pt stated is taking 900 mg 2 times daily.         Nell Angel, [facility] Triage/Med Refill 9/12/2019    Requested Prescriptions   Pending Prescriptions Disp Refills     gabapentin (NEURONTIN) 300 MG capsule [Pharmacy Med Name: GABAPENTIN 300 MG Capsule] 270 capsule 3     Sig: TAKE 1 CAPSULE (300 MG TOTAL) BY MOUTH 3 (THREE) TIMES A DAY.       Gabapentin/Levetiracetam/Tiagabine Refill Protocol  Passed - 9/12/2019  1:53 PM        Passed - PCP or prescribing provider visit in past 12 months or next 3 months     Last office visit with prescriber/PCP: 8/23/2019 Bekah Castanon MD OR same dept: 8/23/2019 Bekah Castanon MD OR same specialty: 8/23/2019 Bekah Castanon MD  Last physical: 4/1/2019 Last MTM visit: Visit date not found   Next visit within 3 mo: Visit date not found  Next physical within 3 mo: Visit date not found  Prescriber OR PCP: Bekah Castanon MD  Last diagnosis associated with med order: 1. Back pain  - gabapentin (NEURONTIN) 300 MG capsule [Pharmacy Med Name: GABAPENTIN 300 MG Capsule]; TAKE 1 CAPSULE (300 MG TOTAL) BY MOUTH 3 (THREE) TIMES A DAY.  Dispense: 270 capsule; Refill: 3    If protocol passes may refill for 12 months if within 3 months of last provider visit (or a total of 15 months).

## 2021-06-02 NOTE — TELEPHONE ENCOUNTER
Please help schedule a face to face office visit for a new nebulizer machine. This is a requirement of medicare and will not be covered without documentation at an office visit of medical necessity.

## 2021-06-02 NOTE — TELEPHONE ENCOUNTER
RN triage call  Patient is calling in to request that PCP order a nebulizer.  He states that he has been using his wife's machine but would like his own with new tubing.  Patient states that the order has to come from his PCP and go through his [facility].  He provided two phone numbers for [facility]  [phone]  Ext [phone]    He would like a call back on the status of this request.  Please advise thank you.  Indu Bolton RN  [facility] Triage Nurse  2:29 PM  10/27/2019

## 2021-06-02 NOTE — TELEPHONE ENCOUNTER
Refill Approved    Rx renewed per Medication Renewal Policy. Medication was last renewed on 5/30/19.    Nell Angel, [facility] Triage/Med Refill 10/16/2019     Requested Prescriptions   Pending Prescriptions Disp Refills     lisinopril (PRINIVIL,ZESTRIL) 20 MG tablet [Pharmacy Med Name: LISINOPRIL 20 MG Tablet] 180 tablet 1     Sig: TAKE 1 TABLET TWICE DAILY FOR BLOOD PRESSURE       Ace Inhibitors Refill Protocol Passed - 10/15/2019  4:51 PM        Passed - PCP or prescribing provider visit in past 12 months       Last office visit with prescriber/PCP: 8/23/2019 Bekah Castanon MD OR same dept: 8/23/2019 Bekah Castanon MD OR same specialty: 8/23/2019 Bekah Castanon MD  Last physical: 4/1/2019 Last MTM visit: Visit date not found   Next visit within 3 mo: Visit date not found  Next physical within 3 mo: Visit date not found  Prescriber OR PCP: Bekah Castanon MD  Last diagnosis associated with med order: 1. Essential hypertension  - lisinopril (PRINIVIL,ZESTRIL) 20 MG tablet [Pharmacy Med Name: LISINOPRIL 20 MG Tablet]; TAKE 1 TABLET TWICE DAILY FOR BLOOD PRESSURE  Dispense: 180 tablet; Refill: 1    If protocol passes may refill for 12 months if within 3 months of last provider visit (or a total of 15 months).             Passed - Serum Potassium in past 12 months     Lab Results   Component Value Date    Potassium 4.1 08/19/2019             Passed - Blood pressure filed in past 12 months     BP Readings from Last 1 Encounters:   08/23/19 118/68             Passed - Serum Creatinine in past 12 months     Creatinine   Date Value Ref Range Status   08/19/2019 0.87 0.70 - 1.30 mg/dL Final

## 2021-06-02 NOTE — TELEPHONE ENCOUNTER
Medication Request  Medication name:     1.traZODone (DESYREL) 50 MG tablet  2. terazosin (HYTRIN) 10 MG capsule    Pharmacy Name and Location: [facility] . ( listed)     Reason for request:   Current Prescriptions were sent to the wrong Pharmacy .  Please re- route to correct Pharmacy    ASAP     When did you use medication last?: NA     Patient offered appointment:  patient declined     Okay to leave a detailed message: yes

## 2021-06-03 NOTE — PROGRESS NOTES
Assessment/Plan:     1. Chronic obstructive pulmonary disease, unspecified COPD type (H)  Nebulizer with supplies (cup, tubing, mask, & filters)   2. Type 2 diabetes mellitus with hyperglycemia, without long-term current use of insulin (H)  Microalbumin, Random Urine       Diagnoses and all orders for this visit:    Chronic obstructive pulmonary disease, unspecified COPD type (H)  -     Nebulizer with supplies (cup, tubing, mask, & filters)  -Today's visit will serve as a face-to-face encounter for a nebulizer with supplies for use at home.  He will use albuterol solution as needed for coughing and dyspnea associated with COPD.  This nebulizer is necessary because he feels that when albuterol is administered via nebulizer its more effective and the effects of the medication are longer lasting than they are with an albuterol inhaler.  He will continue Spiriva and Symbicort inhalers.  He is not quite due for A1c as this was done in August.  He is due for urine microalbumin.  This will be checked today.    Type 2 diabetes mellitus with hyperglycemia, without long-term current use of insulin (H)  -     Microalbumin, Random Urine  -He is not quite due for A1c as this was done in August.  He is due for urine microalbumin.  This will be checked today. Blood pressure is controlled.  He will continue metformin, aspirin and simvastatin           Subjective:      Raf Dalton is a 69 y.o. male who comes in today for a face-to-face encounter for a nebulizer machine.  Patient has a history of COPD, hypoxia, tobacco use as well as obstructive sleep apnea.  He is not a current smoker.  He currently has a prescription for Symbicort inhaler, Spiriva inhaler and albuterol inhaler.  He also has prescription for albuterol solution and he has been using his wife's nebulizer machine.  He wishes to have his own nebulizer machine.  He uses albuterol occasionally.  Feels that when the albuterol is administered via nebulizer it seems to  work better and the effects last longer.  He states he is compliant with his inhaler regimen.  He did have pulmonary function tests on March 6, 2019 that showed a severe struct of ventilatory defect.  He had a chest x-ray in April 2019 that showed hyperexpansion of the lungs.  CT of the chest, abdomen and pelvis on 8/19/2019 showed findings of emphysema.  He states that he is not currently having a flareup of COPD.  He has not noticed increased coughing or sputum production.  He has not been having fevers or chills or increased shortness of breath.  He simply would like to have his own nebulizer machine for use when needed.  We reviewed his medications and allergies.  Chart is updated as needed.  No other concerns or questions today.    Current Outpatient Medications   Medication Sig Dispense Refill     ACCU-CHEK TORI Misc        ACCU-CHEK SMARTVIEW TEST STRIP strips TEST BLOOD SUGAR TWICE DAILY 200 strip 3     acetaminophen (TYLENOL) 500 MG tablet Take 500 mg by mouth as needed.       albuterol (PROAIR HFA;PROVENTIL HFA;VENTOLIN HFA) 90 mcg/actuation inhaler Inhale 2 puffs every 6 (six) hours as needed for wheezing. 1 each 6     albuterol (PROVENTIL) 2.5 mg /3 mL (0.083 %) nebulizer solution Take 3 mL (2.5 mg total) by nebulization every 6 (six) hours as needed for wheezing or shortness of breath. 25 vial 2     aspirin 81 MG EC tablet Take 1 tablet (81 mg total) by mouth daily.  0     budesonide-formoterol (SYMBICORT) 80-4.5 mcg/actuation inhaler Inhale 2 puffs 2 (two) times a day. 1 Inhaler 12     buPROPion (WELLBUTRIN SR) 150 MG 12 hr tablet TAKE 1 TABLET TWICE DAILY 180 tablet 3     cholecalciferol, vitamin D3, (VITAMIN D3) 5,000 unit Tab Take 1 tablet (5,000 Units total) by mouth daily. 90 tablet 3     dulaglutide (TRULICITY) 0.75 mg/0.5 mL PnIj Inject 0.75 mg under the skin every 7 days. 6 mL 3     fluticasone propionate (FLONASE) 50 mcg/actuation nasal spray 1 spray into each nostril 2 (two) times a day as  needed. 16 g 11     gabapentin (NEURONTIN) 300 MG capsule Take 3 capsules in the morning, 2 capsules in the afternoon and 3 capsules in the evening 720 capsule 3     generic lancets (ACCU-CHEK FASTCLIX) USE TO TEST TWICE DAILY 200 each 3     inhalational spacing device Spcr Use two times a day with symbicort 1 each 0     lisinopril (PRINIVIL,ZESTRIL) 20 MG tablet TAKE 1 TABLET TWICE DAILY FOR BLOOD PRESSURE 180 tablet 3     metFORMIN (GLUCOPHAGE-XR) 500 MG 24 hr tablet TAKE 2 TABLETS TWICE DAILY 360 tablet 3     sildenafil (REVATIO) 20 mg tablet Take by mouth as needed.         3     simvastatin (ZOCOR) 40 MG tablet TAKE 1 AND 1/2 TABLETS EVERY  tablet 3     terazosin (HYTRIN) 10 MG capsule Take 2 capsules (20 mg total) by mouth at bedtime. 180 capsule 3     tiotropium (SPIRIVA WITH HANDIHALER) 18 mcg inhalation capsule INHALE CONTENTS OF 1 CAPSULE DAILY 30 capsule 5     traZODone (DESYREL) 50 MG tablet Take 1 tablet (50 mg total) by mouth at bedtime as needed for sleep. 90 tablet 0     testosterone (ANDROGEL) 1.62 % (40.5 mg/2.5 gram) GlPk Apply 1 pck daily (Patient taking differently: as needed. Apply 1 pck daily      ) 75 g 5     No current facility-administered medications for this visit.        Past Medical History, Family History, and Social History reviewed.  Past Medical History:   Diagnosis Date     Aneurysm Of The Abdominal Aorta     Created by Conversion      Benign Prostatic Hypertrophy     Created by Conversion      Benign Prostatic Hypertrophy With Urinary Obstruction     Created by Conversion      Cataract     Created by Conversion      Cervicalgia     Created by Conversion      Chest pain 08/19/2019     Chronic Obstructive Pulmonary Disease     Created by Conversion      Diabetes mellitus, type II (H)      High cholesterol      Hyperglycemia     Created by Conversion      Hyperlipidemia     Created by Conversion      Hypertension     Created by Conversion      Hypoxia     Created by Conversion       Lower Back Sprain     Created by Conversion      Obstructive Sleep Apnea     Created by Conversion      Shortness of breath      Vitamin D Deficiency     Created by Conversion      Past Surgical History:   Procedure Laterality Date     ABDOMINAL AORTIC ANEURYSM REPAIR  2019     FINGER SURGERY       Patient has no known allergies.  Family History   Problem Relation Age of Onset     Snoring Father      Social History     Socioeconomic History     Marital status:      Spouse name: Not on file     Number of children: Not on file     Years of education: Not on file     Highest education level: Not on file   Occupational History     Not on file   Social Needs     Financial resource strain: Not on file     Food insecurity:     Worry: Not on file     Inability: Not on file     Transportation needs:     Medical: Not on file     Non-medical: Not on file   Tobacco Use     Smoking status: Former Smoker     Packs/day: 1.50     Years: 55.00     Pack years: 82.50     Types: Cigarettes     Last attempt to quit: 10/18/2017     Years since quittin.0     Smokeless tobacco: Never Used   Substance and Sexual Activity     Alcohol use: Yes     Alcohol/week: 21.0 standard drinks     Types: 21 Cans of beer per week     Comment: 21 beers per week     Drug use: No     Sexual activity: Never     Partners: Female     Birth control/protection: None   Lifestyle     Physical activity:     Days per week: Not on file     Minutes per session: Not on file     Stress: Not on file   Relationships     Social connections:     Talks on phone: Not on file     Gets together: Not on file     Attends [employer] service: Not on file     Active member of club or organization: Not on file     Attends meetings of clubs or organizations: Not on file     Relationship status: Not on file     Intimate partner violence:     Fear of current or ex partner: Not on file     Emotionally abused: Not on file     Physically abused: Not on file     Forced  "sexual activity: Not on file   Other Topics Concern     Not on file   Social History Narrative     Not on file         Review of systems is as stated in HPI, and the remainder of the 10 system review is otherwise negative.    Objective:     Vitals:    11/04/19 1026   BP: 118/74   Patient Site: Right Arm   Patient Position: Sitting   Cuff Size: Adult Large   Pulse: 97   Temp: 99.1  F (37.3  C)   TempSrc: Oral   SpO2: 91%   Weight: (!) 227 lb 7 oz (103.2 kg)   Height: 5' 9\" (1.753 m)    Body mass index is 33.59 kg/m .      General appearance: alert, appears stated age and cooperative  Head: Normocephalic, without obvious abnormality, atraumatic  Eyes: negative  Ears: normal TM's and external ear canals both ears  Nose: Nares normal. Septum midline. Mucosa normal. No drainage or sinus tenderness.  Throat: lips, mucosa, and tongue normal; teeth and gums normal  Neck: no adenopathy  Lungs: clear to auscultation bilaterally  Heart: regular rate and rhythm, S1, S2 normal, no murmur, click, rub or gallop    This was a 25 minute visit with greater than 50% of time spent in counseling and coordination of care    This note has been dictated using voice recognition software. Any grammatical or context distortions are unintentional and inherent to the the software.       "

## 2021-06-04 NOTE — TELEPHONE ENCOUNTER
Refill Approved    Rx renewed per Medication Renewal Policy. Medication was last renewed on 2/20/19.    Nell Angel, [facility] Triage/Med Refill 12/18/2019     Requested Prescriptions   Pending Prescriptions Disp Refills     simvastatin (ZOCOR) 40 MG tablet [Pharmacy Med Name: SIMVASTATIN 40 MG Tablet] 135 tablet 0     Sig: TAKE 1 AND 1/2 TABLETS EVERY DAY       Statins Refill Protocol (Hmg CoA Reductase Inhibitors) Passed - 12/16/2019  2:30 PM        Passed - PCP or prescribing provider visit in past 12 months      Last office visit with prescriber/PCP: 11/4/2019 Bekah Castanon MD OR same dept: 11/4/2019 Bekah Castanon MD OR same specialty: 11/4/2019 Bekah Castanon MD  Last physical: 4/1/2019 Last MTM visit: Visit date not found   Next visit within 3 mo: Visit date not found  Next physical within 3 mo: Visit date not found  Prescriber OR PCP: Bekah Castanon MD  Last diagnosis associated with med order: 1. Hyperlipemia  - simvastatin (ZOCOR) 40 MG tablet [Pharmacy Med Name: SIMVASTATIN 40 MG Tablet]; TAKE 1 AND 1/2 TABLETS EVERY DAY  Dispense: 135 tablet; Refill: 0    If protocol passes may refill for 12 months if within 3 months of last provider visit (or a total of 15 months).

## 2021-06-06 NOTE — TELEPHONE ENCOUNTER
Pt is calling in to get tested for the Corona virus. Pt has not traveled anywhere, and has not even been out of his house for some time. Pt denies any symptoms. No fever, shortness of breath, no cough, sinus congestion, chest congestion, earache, or sore throat. Pt is understandably very concerned about his wife being vulnerable to get the virus, as she is in a nursing home for strengthening right now. Pt indicates he does have an appointment with his PCP for tomorrow.   Home care measures discussed, and information given. Advised pt he can call [email or web address] to see if they would recommend testing. Advised pt to discuss this with his PCP tomorrow.      Benja Martinez, RN [facility] Triage/Medication Refill    Reason for Disposition    [1] No CORONAVIRUS EXPOSURE BUT [2] questions about    Protocols used: CORONAVIRUS (2019-NCOV) EXPOSURE-A-AH

## 2021-06-06 NOTE — PROGRESS NOTES
Assessment/Plan:     1. Type 2 diabetes mellitus with diabetic polyneuropathy, without long-term current use of insulin (H)  Glycosylated Hemoglobin A1c    Comprehensive Metabolic Panel    Lipid Cascade RANDOM   2. Chronic bilateral low back pain, unspecified whether sciatica present  gabapentin (NEURONTIN) 300 MG capsule    DISCONTINUED: gabapentin (NEURONTIN) 300 MG capsule   3. Pain in both feet  Ambulatory referral to Podiatry   4. Weight loss  HM1(CBC and Differential)    Thyroid Stimulating Hormone (TSH)    HM1 (CBC with Diff)   5. Chronic obstructive pulmonary disease, unspecified COPD type (H)     6. Aneurysm of abdominal vessel (H)     7. Hypertension     8. Fall, initial encounter         Diagnoses and all orders for this visit:    Type 2 diabetes mellitus with diabetic polyneuropathy, without long-term current use of insulin (H)  -     Glycosylated Hemoglobin A1c  -     Comprehensive Metabolic Panel  -     Lipid Cascade RANDOM  -We will check above labs today.  Blood pressure is controlled.  He will continue aspirin and statin.  Continue metformin and Trulicity.  Continue weight loss efforts.  Follow-up again in 3 to 4 months is recommended    Chronic bilateral low back pain, unspecified whether sciatica present  -Continue gabapentin  -     gabapentin (NEURONTIN) 300 MG capsule; Take 3 capsules in the morning, 2 in the afternoon and 3 capsules in the evening  Dispense: 720 capsule; Refill: 3    Pain in both feet  -     Ambulatory referral to Podiatry    Weight loss  -     HM1(CBC and Differential)  -     Thyroid Stimulating Hormone (TSH)  -     HM1 (CBC with Diff)  -Discussed that safe weight loss is 1 to 2 pounds per week.  Given this fact, we discussed that he has lost 10 pounds since his last visit in November and this does not raise any red flags as he has been eating regularly and trying to lose weight.  We will check a CBC with differential as well as a TSH to evaluate for thyroid disorder or  abnormalities of the blood that could be contributing to his weight loss.     Chronic obstructive pulmonary disease, unspecified COPD type (H)  Stable.  He will continue his current inhalers    Aneurysm of abdominal vessel (H)  Continue aspirin and statin.  Blood pressure controlled.  He will follow-up with vascular surgeon in May    Hypertension  Blood pressure is controlled.  He will continue current medication    Fall, initial encounter  -Sounds like his fall was mechanical in nature and that he lost his balance.  We discussed importance of regular exercise, changing positions slowly and adequate hydration.  He feels that his neuropathy likely contributed to his fall so we will continue his gabapentin and increase his dose to 900 mg in the morning, 600 in the afternoon and 900 mg at night.  He will also schedule an appointment with podiatrist               Subjective:      Raf Dalton is a 69 y.o. male who comes in today with several concerns.  He is due for follow-up on hypertension and diabetes.  He also would like to discuss weight loss.  He also recently had a fall.  He was last seen in clinic in November.  States that he has been under quite a bit of stress recently as his wife has been hospitalized on multiple occasions over the past couple of months.  She is currently stable and is improving at a TCU.  She is planning to return home at the end of next week.  Due to Covid-19, he has not been able to visit her for nearly a week.  He is concerned about preventing her from getting this infection once she returns home.  He initially requested testing for Covid-19 but reports that he has not had any symptoms of concern  such as fever, cough or lower respiratory symptoms.  He has not had any travel to high risk areas or been  in contact with anybody who has been exposed or tested positive to the virus.  He has lost about 10 pounds since November.  He is trying to lose weight but is concerned that this may be  too much weight to lose in this timeframe.  States that he is eating regularly.  He would like to lose another 20 to 30 pounds overall.  States that overall he has been feeling well.  He did have a fall about a week ago when he was walking out to his car.  He was having some back problems and was having difficulty standing up straight.  Ultimately lost his balance and fell onto his right side.  Bruised his right hip and scraped his right wrist.  Did feel a little bit lightheaded prior to falling.  States his equilibrium has been off and he attributes this to neuropathy in his feet.  He does have pain in his feet and is interested in seeing a podiatrist.  No serious injuries as result of the fall.  Feels that he is healing up without any problems.  He would be interested in seeing a podiatrist for his foot pain and concerns.  He is taking gabapentin.  He takes 900 mg twice daily.  Still has neuropathy and numbness in his feet.  He will be due for his yearly follow-up with his vascular surgeon in May.  This is already scheduled.  Underwent AAA repair in April 2019.  He has not been having any symptoms of chest pain or pressure.  No dyspnea with exertion.  No lower extremity edema.  Meds and allergies are reviewed and updated.  Review of systems is assessed and is otherwise negative.  No other concerns or questions today.    Current Outpatient Medications   Medication Sig Dispense Refill     ACCU-CHEK TORI [facility]        ACCU-CHEK SMARTVIEW TEST STRIP strips TEST BLOOD SUGAR TWICE DAILY 200 strip 3     acetaminophen (TYLENOL) 500 MG tablet Take 500 mg by mouth as needed.       albuterol (PROAIR HFA;PROVENTIL HFA;VENTOLIN HFA) 90 mcg/actuation inhaler Inhale 2 puffs every 6 (six) hours as needed for wheezing. 1 each 6     albuterol (PROVENTIL) 2.5 mg /3 mL (0.083 %) nebulizer solution Take 3 mL (2.5 mg total) by nebulization every 6 (six) hours as needed for wheezing or shortness of breath. 25 vial 2     aspirin 81 MG EC  tablet Take 1 tablet (81 mg total) by mouth daily.  0     budesonide-formoterol (SYMBICORT) 80-4.5 mcg/actuation inhaler Inhale 2 puffs 2 (two) times a day. 1 Inhaler 12     buPROPion (WELLBUTRIN SR) 150 MG 12 hr tablet TAKE 1 TABLET TWICE DAILY 180 tablet 3     cholecalciferol, vitamin D3, (VITAMIN D3) 5,000 unit Tab Take 1 tablet (5,000 Units total) by mouth daily. 90 tablet 3     dulaglutide (TRULICITY) 0.75 mg/0.5 mL PnIj Inject 0.75 mg under the skin every 7 days. 6 mL 3     fluticasone propionate (FLONASE) 50 mcg/actuation nasal spray 1 spray into each nostril 2 (two) times a day as needed. 16 g 11     gabapentin (NEURONTIN) 300 MG capsule Take 3 capsules in the morning, 2 in the afternoon and 3 capsules in the evening 720 capsule 3     generic lancets (ACCU-CHEK FASTCLIX) USE TO TEST TWICE DAILY 200 each 3     inhalational spacing device Spcr Use two times a day with symbicort 1 each 0     lisinopril (PRINIVIL,ZESTRIL) 20 MG tablet TAKE 1 TABLET TWICE DAILY FOR BLOOD PRESSURE 180 tablet 3     metFORMIN (GLUCOPHAGE-XR) 500 MG 24 hr tablet TAKE 2 TABLETS TWICE DAILY 360 tablet 3     sildenafil (REVATIO) 20 mg tablet Take by mouth as needed.         3     simvastatin (ZOCOR) 40 MG tablet TAKE 1 AND 1/2 TABLETS EVERY  tablet 3     terazosin (HYTRIN) 10 MG capsule Take 2 capsules (20 mg total) by mouth at bedtime. 180 capsule 3     testosterone (ANDROGEL) 1.62 % (40.5 mg/2.5 gram) GlPk Apply 1 pck daily (Patient taking differently: as needed. Apply 1 pck daily      ) 75 g 5     tiotropium (SPIRIVA WITH HANDIHALER) 18 mcg inhalation capsule INHALE CONTENTS OF 1 CAPSULE DAILY 30 capsule 5     traZODone (DESYREL) 50 MG tablet Take 1 tablet (50 mg total) by mouth at bedtime as needed for sleep. 90 tablet 0     No current facility-administered medications for this visit.        Past Medical History, Family History, and Social History reviewed.  Past Medical History:   Diagnosis Date     Aneurysm Of The Abdominal  Aorta     Created by Conversion      Benign Prostatic Hypertrophy     Created by Conversion      Benign Prostatic Hypertrophy With Urinary Obstruction     Created by Conversion      Cataract     Created by Conversion      Cervicalgia     Created by Conversion      Chest pain 2019     Chronic Obstructive Pulmonary Disease     Created by Conversion      Diabetes mellitus, type II (H)      High cholesterol      Hyperglycemia     Created by Conversion      Hyperlipidemia     Created by Conversion      Hypertension     Created by Conversion      Hypoxia     Created by Conversion      Lower Back Sprain     Created by Conversion      Obstructive Sleep Apnea     Created by Conversion      Shortness of breath      Vitamin D Deficiency     Created by Conversion      Past Surgical History:   Procedure Laterality Date     ABDOMINAL AORTIC ANEURYSM REPAIR  2019     FINGER SURGERY       Patient has no known allergies.  Family History   Problem Relation Age of Onset     Snoring Father      Social History     Socioeconomic History     Marital status:      Spouse name: Not on file     Number of children: Not on file     Years of education: Not on file     Highest education level: Not on file   Occupational History     Not on file   Social Needs     Financial resource strain: Not on file     Food insecurity     Worry: Not on file     Inability: Not on file     Transportation needs     Medical: Not on file     Non-medical: Not on file   Tobacco Use     Smoking status: Former Smoker     Packs/day: 1.50     Years: 55.00     Pack years: 82.50     Types: Cigarettes     Last attempt to quit: 10/18/2017     Years since quittin.4     Smokeless tobacco: Never Used   Substance and Sexual Activity     Alcohol use: Yes     Alcohol/week: 21.0 standard drinks     Types: 21 Cans of beer per week     Comment: 21 beers per week     Drug use: No     Sexual activity: Never     Partners: Female     Birth control/protection: None  "  Lifestyle     Physical activity     Days per week: Not on file     Minutes per session: Not on file     Stress: Not on file   Relationships     Social connections     Talks on phone: Not on file     Gets together: Not on file     Attends Anab[employer]ist service: Not on file     Active member of club or organization: Not on file     Attends meetings of clubs or organizations: Not on file     Relationship status: Not on file     Intimate partner violence     Fear of current or ex partner: Not on file     Emotionally abused: Not on file     Physically abused: Not on file     Forced sexual activity: Not on file   Other Topics Concern     Not on file   Social History Narrative     Not on file         Review of systems is as stated in HPI, and the remainder of the 10 system review is otherwise negative.    Objective:     Vitals:    03/16/20 0927   BP: 128/76   Pulse: 85   Resp: 16   SpO2: 93%   Weight: 217 lb (98.4 kg)   Height: 5' 9\" (1.753 m)    Body mass index is 32.05 kg/m .        General appearance: alert, appears stated age and cooperative  Head: Normocephalic, without obvious abnormality, atraumatic  Lungs: clear to auscultation bilaterally  Heart: regular rate and rhythm, S1, S2 normal, no murmur, click, rub or gallop  Extremities: extremities normal, atraumatic, no cyanosis or edema  Neurologic: Grossly normal      This note has been dictated using voice recognition software. Any grammatical or context distortions are unintentional and inherent to the the software.       "

## 2021-06-08 NOTE — PROGRESS NOTES
Assessment:     Raf was seen today for diabetes.    Diagnoses and all orders for this visit:    Type 2 diabetes mellitus  -     Glycosylated Hemoglobin A1c  -     Microalbumin, Random Urine    COPD (chronic obstructive pulmonary disease) with emphysema    Hypoxia    Low libido  -     Testosterone, Total and Free    BPH (benign prostatic hyperplasia)  -     Testosterone, Total and Free  -     tadalafil (CIALIS) 5 MG tablet; Take 1 tablet (5 mg total) by mouth daily as needed for erectile dysfunction.    AAA (abdominal aortic aneurysm)        Plan:     1. Type 2 diabetes mellitus  Patient's A1c has improved to 7.2.  Please with this.  However he has gained 9 pounds.  We review the reasons for weight gain and dietary control measures.  He is not smoking  - Glycosylated Hemoglobin A1c  - Microalbumin, Random Urine    2. COPD (chronic obstructive pulmonary disease) with emphysema  COPD with emphysema but he's not using his nighttime oxygen.  I encouraged him to get back on nighttime oxygen and this may help control his overeating.    3. Hypoxia  As stated above    4.  Decreased libido  Patient asks for testosterone level and so we'll go ahead and order that for him.  He does have decreased libido and decreased erections.  5.  Benign prostate hypertrophy.  For now we'll have him take his to Hytrin at night status to see if that improves the number of times he needs to get up at night to urinate.  If this is not enough we can always add Flomax.  Because of prostate hypertrophy will also prescribe Cialis 5 mg daily.  Rx printed so he can cost compare out working get the best price.  6.  AAA  He has abdominal aortic aneurysm that we've been monitoring carefully.  He is stable for surgery and is followed by cardiology and pulmonary.  One thing he needs to do is wear his nighttime oxygen.  Follow-up with Dr. Stark in a month.  Patient's leery of having surgery but we been preparing him for surgery for triple aortic  aneurysm repair over the last 2 years    This is a 40 minute visit with greater than 50% of the time spent counseling regarding all above problems have issues to review.  He is doing well with his diabetes but needs to continue his weight loss efforts.  Weight Watchers program is discussed at length.  He needs to get back on his oxygen at night.  This entails wearing his CPAP with oxygen.  Do a trial of dialysis to see if this helps nocturnal enuresis.          Subjective:      Yohana is a 66 y.o. male presenting to my clinic for follow-up of multiple issues.  He is type 2 diabetes and we've been managing him for several years.  He is trying to get exercise and lower his weight.  He was doing well with his weight but now is frustrated because he gained 9 pounds in the last 3 months.    He has nocturnal hypoxemia secondary to COPD.  He tells me is not wearing his CPAP or his oxygen now at night and hasn't for many months.  This could be contributing to his weight gain over eating i.e. the fatigue.    He also has triple aortic aneurysm with an monitoring this with Dr. Stark.  We been preparing him for surgery with cardiac support and respiratory eval over the last several years.  He is stable regarding his problems except for getting back on his oxygen..    Happy to see that his hemoglobin A1c has improved to 7.2 today.  He has issues regarding his diet and weight loss efforts that he discusses at length.  He has issues regarding decreased libido and decreased erections.  He wants to try Jose Schuster 5 mg daily and we can do this with a printed prescription adhesion compare cost.  This may be covered as it's due for benign prostate hypertrophy.    His wife is home from an extended hospital and [facility].  The unfortunate thing is she was relapsed to smoking.  He continues not to smoke      Current Outpatient Prescriptions on File Prior to Visit   Medication Sig Dispense Refill     aspirin 81 MG EC tablet Take 1  tablet (81 mg total) by mouth daily.  0     blood glucose test (ACCU-CHEK SMARTVIEW TEST STRIP) strips Use 1 each As Directed 2 (two) times a day. 100 each 2     budesonide-formoterol (SYMBICORT) 80-4.5 mcg/actuation inhaler Inhale 2 puffs 2 (two) times a day. 1 Inhaler 12     buPROPion (WELLBUTRIN SR) 150 MG 12 hr tablet TAKE 1 TABLET BY MOUTH TWICE DAILY 180 tablet 3     cholecalciferol, vitamin D3, (VITAMIN D3) 5,000 unit Tab Take 1 tablet (5,000 Units total) by mouth daily. 90 tablet 3     generic lancets (ACCU-CHEK FASTCLIX) Use 1 each As Directed 2 (two) times a day. 102 each 3     glipiZIDE (GLUCOTROL) 5 MG tablet Take 1 tablet (5 mg total) by mouth 2 (two) times a day before meals. 1/2 Hour BEFORE meals 60 tablet 11     lisinopril (PRINIVIL,ZESTRIL) 20 MG tablet TAKE 1 TABLET BY MOUTH TWICE DAILY FOR BLOOD PRESSURE 180 tablet 3     metFORMIN (GLUCOPHAGE XR) 500 MG 24 hr tablet Take 1 tablet (500 mg total) by mouth 2 (two) times a day. 60 tablet 11     simvastatin (ZOCOR) 40 MG tablet TAKE 1 AND 1/2 TABLETS BY MOUTH EVERY  tablet 2     SPIRIVA WITH HANDIHALER 18 mcg inhalation capsule INHALE CONTENTS OF 1 CAPSULE DAILY 30 capsule 5     terazosin (HYTRIN) 2 MG capsule Take 2 capsules (4 mg total) by mouth daily. 180 capsule 3     No current facility-administered medications on file prior to visit.      No Known Allergies  Past Medical History   Diagnosis Date     Aneurysm Of The Abdominal Aorta      Created by Conversion      Benign Prostatic Hypertrophy      Created by Conversion      Benign Prostatic Hypertrophy With Urinary Obstruction      Created by Conversion      Cataract      Created by Conversion      Cervicalgia      Created by Conversion      Chronic Obstructive Pulmonary Disease      Created by Conversion      Hyperglycemia      Created by Conversion      Hyperlipidemia      Created by Conversion      Hypertension      Created by Conversion      Hypoxia      Created by Conversion      Lower  Back Sprain      Created by Conversion      Obstructive Sleep Apnea      Created by Conversion      Vitamin D Deficiency      Created by Conversion      Past Surgical History   Procedure Laterality Date     Finger surgery       Social History     Social History     Marital status:      Spouse name: N/A     Number of children: N/A     Years of education: N/A     Occupational History     Not on file.     Social History Main Topics     Smoking status: Former Smoker     Packs/day: 0.25     Years: 54.00     Types: Cigarettes     Quit date: 7/26/2016     Smokeless tobacco: Former User     Alcohol use 15.0 oz/week     30 Standard drinks or equivalent per week      Comment: 21 beers per week     Drug use: No     Sexual activity: No     Other Topics Concern     Not on file     Social History Narrative     History reviewed. No pertinent family history.    ROS:  I have performed a 10 point ROS.  All pertinent positives and negatives are found in the HPI.  All others are negative.    Weight gain noted.  Labs reviewed    Objective:     Physical Exam:  Visit Vitals     /72 (Patient Site: Right Arm, Patient Position: Sitting, Cuff Size: Adult Large)     Pulse 80     Wt (!) 251 lb 14.4 oz (114.3 kg)     BMI 36.14 kg/m2     General Appearance: Alert, cooperative, no distress, appears stated age  Head: Normocephalic, without obvious abnormality, atraumatic  Throat: Lips, mucosa, and tongue normal; teeth and gums normal  Neck: Supple, symmetrical, trachea midline, no adenopathy;  thyroid: not enlarged, symmetric, no tenderness/mass/nodules; no carotid bruit or JVD  Lungs: Clear to auscultation bilaterally, respirations unlabored/  Decreased breath sounds overall consistent with COPD  Heart: Regular rate and rhythm, S1 and S2 normal, no murmur, rub, or gallop,   Chest/Breasts: No breast masses, tenderness, asymmetry, or nipple discharge.  Abdomen: Soft, non-tender, bowel sounds active all four quadrants,  no masses, no  organomegaly  Protruding abdomen with weight gain  Extremities: Extremities normal, atraumatic, no cyanosis or edema  Skin: Skin color, texture, turgor normal, no rashes or lesions  Lymph nodes: Cervical, supraclavicular, and axillary nodes normal  Neurologic: Intact, no focal deficits   Mental status:  Appropriate, Affect normal/good spirits  Labs:  Hemoglobin A1c equals 7.2-improvement microalbumin ordered.    Imaging: Monitor CT scan and ultrasound and note by Dr. Stark regarding history of bullae

## 2021-06-08 NOTE — TELEPHONE ENCOUNTER
Refill Approved    Rx renewed per Medication Renewal Policy. Medication was last renewed on 8/27/19.    Nell Angel, [facility] [place] Connection Triage/Med Refill 6/1/2020     Requested Prescriptions   Pending Prescriptions Disp Refills     buPROPion (WELLBUTRIN SR) 150 MG 12 hr tablet [Pharmacy Med Name: BUPROPION HYDROCHLORIDE ER (SR) 150 MG Tablet Extended Release 12 Hour] 180 tablet 3     Sig: TAKE 1 TABLET TWICE DAILY       Tricyclics/Misc Antidepressant/Antianxiety Meds Refill Protocol Passed - 5/27/2020  7:48 PM        Passed - PCP or prescribing provider visit in last year     Last office visit with prescriber/PCP: 3/16/2020 Bekah Castanon MD OR same dept: 3/16/2020 Bekah Castanon MD OR same specialty: 3/16/2020 Bekah Castanon MD  Last physical: 4/1/2019 Last MTM visit: Visit date not found   Next visit within 3 mo: Visit date not found  Next physical within 3 mo: Visit date not found  Prescriber OR PCP: Bekah Castanon MD  Last diagnosis associated with med order: 1. Encounter for smoking cessation counseling  - buPROPion (WELLBUTRIN SR) 150 MG 12 hr tablet [Pharmacy Med Name: BUPROPION HYDROCHLORIDE ER (SR) 150 MG Tablet Extended Release 12 Hour]; TAKE 1 TABLET TWICE DAILY  Dispense: 180 tablet; Refill: 3    If protocol passes may refill for 12 months if within 3 months of last provider visit (or a total of 15 months).

## 2021-06-08 NOTE — PROGRESS NOTES
"Raf Dalton is a 69 y.o. male who is being evaluated via a billable video visit.      The patient has been notified of following:     \"This video visit will be conducted via a call between you and your physician/provider. We have found that certain health care needs can be provided without the need for an in-person physical exam.  This service lets us provide the care you need with a video conversation.  If a prescription is necessary we can send it directly to your pharmacy.  If lab work is needed we can place an order for that and you can then stop by our lab to have the test done at a later time.    Video visits are billed at different rates depending on your insurance coverage. Please reach out to your insurance provider with any questions.    If during the course of the call the physician/provider feels a video visit is not appropriate, you will not be charged for this service.\"    Patient has given verbal consent to a Video visit? Yes    Patient would like to receive their AVS by AVS Preference: Mail a copy.    Patient would like the video invitation sent by: Send to e-mail at: [email or web address]    Will anyone else be joining your video visit? No            CT comp SPR on 5/7/20 (had images pushed over).  1 year f/u.  EVAR 4/9/19. On asa, statin.       Video-Visit Details  Video Start Time: 2:20pm  Type of service:  Video Visit    Video End Time (time video stopped): 2:41pm  Originating Location (pt. Location): Home    Distant Location (provider location):  [facility] VASCULAR [facility]      Platform used for Video Visit: Kaylan"

## 2021-06-08 NOTE — PROGRESS NOTES
4.6 to 4.17 mm    Time 2:20 - 241    VASCULAR SURGERY OUTPATIENT CONSULT OR VISIT   VASCULAR SURGEON: Marquita Brian MD    LOCATION:  [facility]    This is a virtual visit performed with video    Raf Dalton   Medical Record #:  [id number]  YOB: 1950  Age:  69 y.o.     Date of Service: 5/13/2020    PRIMARY CARE PROVIDER: Bekah Castanon MD      Reason for consultation: Follow-up after endovascular aneurysm repair    IMPRESSION: Patient clinically doing extremely well with no complaints.  CT shows fairly significant aneurysm sac shrinkage from 4.6cm to 4.2 cm in on comparable images.  No evidence of endoleak whatsoever.    RECOMMENDATION: Continue with current regimen.  Overall good prognosis with aneurysm sac shrinkage.  I will see him back in 1 year time with a noncontrast CT Abdo pelvis.  While his overall health suggest that he should be on aspirin and statin I will not start these at this point.  Unlikely to quit smoking.    HPI:  Raf Dalton is a 69 y.o. male who was seen today in follow-up for his endovascular aneurysm repair which I have performed him under local anesthesia in April of last year.  This was done because of a prior cardiac history as well as some pulmonary history.  Original size of the aneurysm at time of repair was 6.8 cm.  he is on diabetic meds and COPD meds but not on aspirin or statin.  Active smoker.    PHH:    Past Medical History:   Diagnosis Date     Aneurysm Of The Abdominal Aorta     Created by Conversion      Benign Prostatic Hypertrophy     Created by Conversion      Benign Prostatic Hypertrophy With Urinary Obstruction     Created by Conversion      Cataract     Created by Conversion      Cervicalgia     Created by Conversion      Chest pain 08/19/2019     Chronic Obstructive Pulmonary Disease     Created by Conversion      Diabetes mellitus, type II (H)      High cholesterol      Hyperglycemia     Created by Conversion      Hyperlipidemia      Created by Conversion      Hypertension     Created by Conversion      Hypoxia     Created by Conversion      Lower Back Sprain     Created by Conversion      Obstructive Sleep Apnea     Created by Conversion      Shortness of breath      Vitamin D Deficiency     Created by Conversion         Past Surgical History:   Procedure Laterality Date     ABDOMINAL AORTIC ANEURYSM REPAIR  04/09/2019     FINGER SURGERY         ALLERGIES:  Patient has no known allergies.    MEDS:    Current Outpatient Medications:      ACCU-CHEK TORI Misc, , Disp: , Rfl:      ACCU-CHEK SMARTVIEW TEST STRIP strips, TEST BLOOD SUGAR TWICE DAILY, Disp: 200 strip, Rfl: 3     acetaminophen (TYLENOL) 500 MG tablet, Take 500 mg by mouth as needed., Disp: , Rfl:      albuterol (PROAIR HFA;PROVENTIL HFA;VENTOLIN HFA) 90 mcg/actuation inhaler, Inhale 2 puffs every 6 (six) hours as needed for wheezing., Disp: 1 each, Rfl: 6     albuterol (PROVENTIL) 2.5 mg /3 mL (0.083 %) nebulizer solution, Take 3 mL (2.5 mg total) by nebulization every 6 (six) hours as needed for wheezing or shortness of breath., Disp: 25 vial, Rfl: 2     aspirin 81 MG EC tablet, Take 1 tablet (81 mg total) by mouth daily., Disp: , Rfl: 0     budesonide-formoterol (SYMBICORT) 80-4.5 mcg/actuation inhaler, Inhale 2 puffs 2 (two) times a day., Disp: 1 Inhaler, Rfl: 12     buPROPion (WELLBUTRIN SR) 150 MG 12 hr tablet, TAKE 1 TABLET TWICE DAILY, Disp: 180 tablet, Rfl: 3     cholecalciferol, vitamin D3, (VITAMIN D3) 5,000 unit Tab, Take 1 tablet (5,000 Units total) by mouth daily., Disp: 90 tablet, Rfl: 3     dulaglutide (TRULICITY) 0.75 mg/0.5 mL PnIj, Inject 0.75 mg under the skin every 7 days., Disp: 6 mL, Rfl: 3     fluticasone propionate (FLONASE) 50 mcg/actuation nasal spray, 1 spray into each nostril 2 (two) times a day as needed., Disp: 16 g, Rfl: 11     gabapentin (NEURONTIN) 300 MG capsule, Take 3 capsules in the morning, 2 in the afternoon and 3 capsules in the evening, Disp:  "720 capsule, Rfl: 3     generic lancets (ACCU-CHEK FASTCLIX), USE TO TEST TWICE DAILY, Disp: 200 each, Rfl: 3     inhalational spacing device Spcr, Use two times a day with symbicort, Disp: 1 each, Rfl: 0     lisinopril (PRINIVIL,ZESTRIL) 20 MG tablet, TAKE 1 TABLET TWICE DAILY FOR BLOOD PRESSURE, Disp: 180 tablet, Rfl: 3     metFORMIN (GLUCOPHAGE-XR) 500 MG 24 hr tablet, TAKE 2 TABLETS TWICE DAILY, Disp: 360 tablet, Rfl: 3     sildenafil (REVATIO) 20 mg tablet, Take by mouth as needed.    , Disp: , Rfl: 3     simvastatin (ZOCOR) 40 MG tablet, TAKE 1 AND 1/2 TABLETS EVERY DAY, Disp: 135 tablet, Rfl: 3     terazosin (HYTRIN) 10 MG capsule, Take 2 capsules (20 mg total) by mouth at bedtime., Disp: 180 capsule, Rfl: 3     tiotropium (SPIRIVA WITH HANDIHALER) 18 mcg inhalation capsule, INHALE CONTENTS OF 1 CAPSULE DAILY, Disp: 30 capsule, Rfl: 5     traZODone (DESYREL) 50 MG tablet, Take 1 tablet (50 mg total) by mouth at bedtime as needed for sleep., Disp: 90 tablet, Rfl: 0     testosterone (ANDROGEL) 1.62 % (40.5 mg/2.5 gram) GlPk, Apply 1 pck daily (Patient taking differently: as needed. Apply 1 pck daily   ), Disp: 75 g, Rfl: 5    SOCIAL HABITS:    Social History     Tobacco Use   Smoking Status Former Smoker     Packs/day: 1.50     Years: 55.00     Pack years: 82.50     Types: Cigarettes     Last attempt to quit: 10/18/2017     Years since quittin.5   Smokeless Tobacco Never Used       Social History     Substance and Sexual Activity   Alcohol Use Yes     Alcohol/week: 21.0 standard drinks     Types: 21 Cans of beer per week    Comment: 21 beers per week       Social History     Substance and Sexual Activity   Drug Use No       FAMILY HISTORY:    Family History   Problem Relation Age of Onset     Snoring Father        REVIEW OF SYSTEMS:    A 12 point ROS was reviewed and except for what is listed in the HPI above, all others are negative    PE:  Ht 5' 9\" (1.753 m)   Wt 217 lb (98.4 kg)   BMI 32.05 kg/m    Wt " Readings from Last 1 Encounters:   05/13/20 217 lb (98.4 kg)     Body mass index is 32.05 kg/m .    EXAM:  Exam limited as this was a video visit  GENERAL: This is a well-developed 69 y.o. male who appears his stated age  EYES: Grossly normal.  MOUTH: Buccal mucosa normal   MUSCULOSKELETAL: Grossly normal and both lower extremities are intact.  HEME/LYMPH: No lymphedema  NEUROLOGIC: Focally intact, Alert and oriented x 3.   PSYCH: appropriate affect  INTEGUMENT: No open lesions or ulcers        DIAGNOSTIC STUDIES:     Images:  Ct External Imaging Abdomen    Result Date: 5/8/2020  Please see PACS Hyperlink for images and scanned result text.      I personally reviewed the images and my interpretation is that his CT demonstrates good positioning of the endograft with no evidence of endoleak and additional sac shrinkage now to 4.2 cm in maximum diameter..    LABS:      Sodium   Date Value Ref Range Status   03/16/2020 139 136 - 145 mmol/L Final   08/19/2019 139 136 - 145 mmol/L Final   04/17/2019 136 136 - 145 mmol/L Final     Potassium   Date Value Ref Range Status   03/16/2020 4.3 3.5 - 5.0 mmol/L Final   08/19/2019 4.1 3.5 - 5.0 mmol/L Final   04/17/2019 4.6 3.5 - 5.0 mmol/L Final     Chloride   Date Value Ref Range Status   03/16/2020 102 98 - 107 mmol/L Final   08/19/2019 102 98 - 107 mmol/L Final   04/17/2019 99 98 - 107 mmol/L Final     BUN   Date Value Ref Range Status   03/16/2020 12 8 - 28 mg/dL Final   08/19/2019 9 8 - 28 mg/dL Final   04/17/2019 14 8 - 22 mg/dL Final     Creatinine   Date Value Ref Range Status   03/16/2020 0.80 0.70 - 1.30 mg/dL Final   08/19/2019 0.87 0.70 - 1.30 mg/dL Final   04/17/2019 0.97 0.70 - 1.30 mg/dL Final     Hemoglobin   Date Value Ref Range Status   03/16/2020 14.8 14.0 - 18.0 g/dL Final   08/19/2019 15.1 14.0 - 18.0 g/dL Final   04/17/2019 13.4 (L) 14.0 - 18.0 g/dL Final     Platelets   Date Value Ref Range Status   03/16/2020 200 140 - 440 thou/uL Final   08/19/2019 161 140  - 440 thou/uL Final   04/17/2019 273 140 - 440 thou/uL Final       This is a video visit tarting at 2:20 PM and ending at 2:41 PM  Marquita Brian MD  VASCULAR SURGERY

## 2021-06-09 NOTE — PROGRESS NOTES
Pulmonary Progress Note  3/2/2017      Reason for Consult: COPD      Problem List:     Patient Active Problem List   Diagnosis     Obstructive Sleep Apnea     Male Erectile Disorder Due To Physical Condition     Hyperlipidemia     Cataract     Lower Back Sprain     Benign Prostatic Hypertrophy With Urinary Obstruction     Hypertension     Cervicalgia     Simple chronic bronchitis     Benign Prostatic Hypertrophy     Aneurysm Of The Abdominal Aorta     Vitamin D Deficiency     Hypoxia     Hyperglycemia     Exercise hypoxemia         History:   66M here for follow up.    Dyspnea: getting a little worse.  This is in setting of wife smoking in the house.  It is worse with exertion.  It improves with rest.  Symptoms localize to the chest.  No associated chest pain.    JOSE: not wearing mask much currently.  No current problems.  Gets supplies from [employer].    COPD: No chest colds or bronchitis.  He is having more congestion recently.  Sinuses.  Symbicort 1-2 times daily.  Using spiriva.  He is not exercising much.      --- from previous  Using spiriva daily.    No using symbicort often.    Has dyspnea with significant exertion.  Occasionally with dyspnea.  Checks his sats with exercise and it is always above 88%    He is having some trouble with his CPAP.  He has a deviated septum that blocks things up.    He is having aorta surgery and possibly bypass in the coming months.    He has quit smoking.    --- from previous  Patient is a 65 year old male former smoker with history of JOSE and COPD.    He recently had a dizzy episode.  He was evaluated in the ER yesterday.    He had not had an episode of bronchitis in at least 6 months.    He is taking spiriva daily.  He is not sure how often he uses his symbicort.  He has a rescue inhaler as well.     Wears CPAP every night for JOSE.  It helps make his sleep more healthy.    He has smoked recently but has quit now.      -- from previous  He continues on Spiriva and QVAR (chaged  fom symbicort - ?insurnace) but has been stable .   CT of the chest did demonstrate 1.1 cm nodule as well as evidence of prior granulomatous disease.  PET scan obtaind in Sept low avidity - thus further observation and serial imaging recommended.     Overall he has been doing well from a respiratory standpoint. He denies any flares of his lung disease.     Review of systems. Negative x 4 systems except as in HPI.    Social History reviewed - his wife has started smoking in their apartment    Medications:     Current Outpatient Prescriptions on File Prior to Visit   Medication Sig Dispense Refill     aspirin 81 MG EC tablet Take 1 tablet (81 mg total) by mouth daily.  0     blood glucose test (ACCU-CHEK SMARTVIEW TEST STRIP) strips Use 1 each As Directed 2 (two) times a day. 100 each 2     budesonide-formoterol (SYMBICORT) 80-4.5 mcg/actuation inhaler Inhale 2 puffs 2 (two) times a day. 1 Inhaler 12     buPROPion (WELLBUTRIN SR) 150 MG 12 hr tablet TAKE 1 TABLET BY MOUTH TWICE DAILY 180 tablet 3     cholecalciferol, vitamin D3, (VITAMIN D3) 5,000 unit Tab Take 1 tablet (5,000 Units total) by mouth daily. 90 tablet 3     generic lancets (ACCU-CHEK FASTCLIX) Use 1 each As Directed 2 (two) times a day. 102 each 3     glipiZIDE (GLUCOTROL) 5 MG tablet Take 1 tablet (5 mg total) by mouth 2 (two) times a day before meals. 1/2 Hour BEFORE meals 60 tablet 11     lisinopril (PRINIVIL,ZESTRIL) 20 MG tablet TAKE 1 TABLET BY MOUTH TWICE DAILY FOR BLOOD PRESSURE 180 tablet 3     metFORMIN (GLUCOPHAGE XR) 500 MG 24 hr tablet Take 1 tablet (500 mg total) by mouth 2 (two) times a day. 60 tablet 11     simvastatin (ZOCOR) 40 MG tablet TAKE 1 AND 1/2 TABLETS BY MOUTH EVERY  tablet 2     SPIRIVA WITH HANDIHALER 18 mcg inhalation capsule INHALE CONTENTS OF 1 CAPSULE DAILY 30 capsule 5     tadalafil (CIALIS) 5 MG tablet Take 1 tablet (5 mg total) by mouth daily as needed for erectile dysfunction. 30 tablet 6     terazosin (HYTRIN)  2 MG capsule Take 2 capsules (4 mg total) by mouth daily. 180 capsule 3     testosterone 2 mg/24 hour PT24 Place 1 patch on the skin daily. 30 patch 3     No current facility-administered medications on file prior to visit.            Exam/Data:   Vitals  Vitals:    03/02/17 0834   BP: 112/70   Pulse: 100   Resp: 19   SpO2: (!) 89%     EXAM:  Physical Exam   Constitutional: He is oriented to person, place, and time. He appears well-developed and well-nourished. No distress.   HENT:   Head: Normocephalic and atraumatic.   Nose: Nose normal.   Eyes: Conjunctivae are normal. Pupils are equal, round, and reactive to light.   Cardiovascular: Normal rate, regular rhythm, normal heart sounds and intact distal pulses.  Exam reveals no friction rub.    No murmur heard.  Pulmonary/Chest: Effort normal and breath sounds normal. No respiratory distress. He has no wheezes. He has no rales. He exhibits no tenderness.   Abdominal: Soft. Bowel sounds are normal. He exhibits no distension. There is no tenderness.   Obese    Neurological: He is alert and oriented to person, place, and time.   Skin: Skin is warm and dry. He is not diaphoretic. No erythema. No pallor.   Psychiatric: He has a normal mood and affect. Thought content normal.   Vitals reviewed.      Assessment/Plan:   Raf Dalton is a 66 y.o. male referred for further evaluation and management of COPD. GOLD C with exertional hypoxemia (normal DLCO however) as well as 11mm pleural based nodule (PET negative)    Operative Risk: related to COPD, the likelihood of prolonged mechanical ventilation after surgery (>48 hours) is approximately 1.76 %.  To minimize risk you should be mobilized proactively post op, incentive spirometry q 10 minutes during recovery.  You should wear your CPAP after surgery and any time you sleep now or after your operation.  The only reason to delay your surgery is if you are having an active episode of bronchitis.  Of course, you should continue to  not smoke.    Medication Plan  Spiriva once daily  Symbicort 2 times daily  Albuterol as needed    Exercise Plan  3 times EVERY DAY.    2 MINUTES OF WALKING IN THE APARTMENT - MOST IMPORTANT    Sleep Plan  Just put CPAP on when you go to bed.  Focus on this - breaks later in the night are ok.      For oxygen: your goal is >87% at rest.    Total time >25 minutes >50% counseling and coordination of care.

## 2021-06-09 NOTE — TELEPHONE ENCOUNTER
Refill Approved    Rx renewed per Medication Renewal Policy. Medication was last renewed on 07/18/2020    Carrie Horner, [facility] Triage/Med Refill 7/19/2020     Requested Prescriptions   Pending Prescriptions Disp Refills     metFORMIN (GLUCOPHAGE-XR) 500 MG 24 hr tablet [Pharmacy Med Name: METFORMIN HYDROCHLORIDE  MG Tablet Extended Release 24 Hour] 360 tablet 0     Sig: TAKE 2 TABLETS TWICE DAILY       Metformin Refill Protocol Passed - 7/18/2020 12:51 PM        Passed - Blood pressure in last 12 months     BP Readings from Last 1 Encounters:   03/16/20 128/76             Passed - LFT or AST or ALT in last 12 months     Albumin   Date Value Ref Range Status   03/16/2020 3.7 3.5 - 5.0 g/dL Final     Bilirubin, Total   Date Value Ref Range Status   03/16/2020 0.7 0.0 - 1.0 mg/dL Final     Bilirubin, Direct   Date Value Ref Range Status   04/01/2016 0.3 <=0.5 mg/dL Final     Alkaline Phosphatase   Date Value Ref Range Status   03/16/2020 77 45 - 120 U/L Final     AST   Date Value Ref Range Status   03/16/2020 16 0 - 40 U/L Final     ALT   Date Value Ref Range Status   03/16/2020 16 0 - 45 U/L Final     Protein, Total   Date Value Ref Range Status   03/16/2020 6.8 6.0 - 8.0 g/dL Final                Passed - GFR or Serum Creatinine in last 6 months     GFR MDRD Non Af Amer   Date Value Ref Range Status   03/16/2020 >60 >60 mL/min/1.73m2 Final     GFR MDRD Af Amer   Date Value Ref Range Status   03/16/2020 >60 >60 mL/min/1.73m2 Final             Passed - Visit with PCP or prescribing provider visit in last 6 months or next 3 months     Last office visit with prescriber/PCP: 3/16/2020 OR same dept: 3/16/2020 Bekah Castanon MD OR same specialty: 3/16/2020 Bekah Castanon MD Last physical: Visit date not found Last MTM visit: Visit date not found         Next appt within 3 mo: Visit date not found  Next physical within 3 mo: Visit date not found  Prescriber OR PCP: Bekah Castanon MD  Last diagnosis  associated with med order: 1. Type 2 diabetes mellitus (H)  - metFORMIN (GLUCOPHAGE-XR) 500 MG 24 hr tablet [Pharmacy Med Name: METFORMIN HYDROCHLORIDE  MG Tablet Extended Release 24 Hour]; TAKE 2 TABLETS TWICE DAILY  Dispense: 360 tablet; Refill: 0     If protocol passes may refill for 12 months if within 3 months of last provider visit (or a total of 15 months).           Passed - A1C in last 6 months     Hemoglobin A1c   Date Value Ref Range Status   03/16/2020 5.6 3.5 - 6.0 % Final               Passed - Microalbumin in last year      Microalbumin, Random Urine   Date Value Ref Range Status   11/04/2019 0.85 0.00 - 1.99 mg/dL Final

## 2021-06-09 NOTE — PROGRESS NOTES
Patient oxygen saturation on RA at rest is 90%.  Oxygen saturation after ambulating 300ft on RA is 84%.    Patient is ambulatory within his/her home.     ...........................Sy Madhu HOOD 3/2/2017 9:04 AM

## 2021-06-09 NOTE — PROGRESS NOTES
Dear Dr. Analisa Katz Md  [address]  [place] [address]    Thank you for the opportunity to participate in the care of . Raf Dalton.    He is a 66 y.o. male who comes to the clinic for the transfer of care of his obstructive sleep apnea.  The patient was diagnosed with severe obstructive sleep apnea on 07/19/2010.  His apnea hypopnea index was grossly elevated at 60.6 events per hour with the lowest dose of 71%.  Raf states that his machine is malfunctioning and was wondering if he can get a new CPAP machine.  He also uses an adapter for oxygen bleed in due to his COPD.  His review of system is significant for weight changes and excessive urination.     Ideal Sleep-Wake Cycle(devoid of societal pressure):    Patient would try to initiate sleep at around 9 PM with a sleep latency of 5 minutes. The patient would have 2 nocturnal awakening. Final wake up time is around 5 AM.      Past Medical History  Past Medical History:   Diagnosis Date     Aneurysm Of The Abdominal Aorta     Created by Conversion      Benign Prostatic Hypertrophy     Created by Conversion      Benign Prostatic Hypertrophy With Urinary Obstruction     Created by Conversion      Cataract     Created by Conversion      Cervicalgia     Created by Conversion      Chronic Obstructive Pulmonary Disease     Created by Conversion      Hyperglycemia     Created by Conversion      Hyperlipidemia     Created by Conversion      Hypertension     Created by Conversion      Hypoxia     Created by Conversion      Lower Back Sprain     Created by Conversion      Obstructive Sleep Apnea     Created by Conversion      Vitamin D Deficiency     Created by Conversion         Past Surgical History  Past Surgical History:   Procedure Laterality Date     FINGER SURGERY          Meds  Current Outpatient Prescriptions   Medication Sig Dispense Refill     ACCU-CHEK SMARTVIEW TEST STRIP strips USE TO TEST TWICE DAILY 100 strip 2     aspirin 81 MG EC  tablet Take 1 tablet (81 mg total) by mouth daily.  0     budesonide-formoterol (SYMBICORT) 80-4.5 mcg/actuation inhaler Inhale 2 puffs 2 (two) times a day. 1 Inhaler 12     buPROPion (WELLBUTRIN SR) 150 MG 12 hr tablet TAKE 1 TABLET BY MOUTH TWICE DAILY 180 tablet 3     cholecalciferol, vitamin D3, (VITAMIN D3) 5,000 unit Tab Take 1 tablet (5,000 Units total) by mouth daily. 90 tablet 3     generic lancets (ACCU-CHEK FASTCLIX) Use 1 each As Directed 2 (two) times a day. 102 each 3     glipiZIDE (GLUCOTROL) 5 MG tablet Take 1 tablet (5 mg total) by mouth 2 (two) times a day before meals. 1/2 Hour BEFORE meals 60 tablet 11     lisinopril (PRINIVIL,ZESTRIL) 20 MG tablet TAKE 1 TABLET BY MOUTH TWICE DAILY FOR BLOOD PRESSURE 180 tablet 3     metFORMIN (GLUCOPHAGE XR) 500 MG 24 hr tablet Take 1 tablet (500 mg total) by mouth 2 (two) times a day. 60 tablet 11     simvastatin (ZOCOR) 40 MG tablet TAKE 1 AND 1/2 TABLETS BY MOUTH EVERY  tablet 2     SPIRIVA WITH HANDIHALER 18 mcg inhalation capsule INHALE CONTENTS OF 1 CAPSULE DAILY 30 capsule 5     terazosin (HYTRIN) 2 MG capsule Take 2 capsules (4 mg total) by mouth daily. 180 capsule 3     testosterone 2 mg/24 hour PT24 Place 1 patch on the skin daily. 30 patch 3     No current facility-administered medications for this visit.         Allergies  Review of patient's allergies indicates no known allergies.     Social History  Social History     Social History     Marital status:      Spouse name: N/A     Number of children: N/A     Years of education: N/A     Occupational History     Not on file.     Social History Main Topics     Smoking status: Former Smoker     Packs/day: 0.25     Years: 54.00     Types: Cigarettes     Quit date: 7/26/2016     Smokeless tobacco: Former User     Alcohol use 15.0 oz/week     30 Standard drinks or equivalent per week      Comment: 21 beers per week     Drug use: No     Sexual activity: No     Other Topics Concern     Not on  file     Social History Narrative        Family History  Family History   Problem Relation Age of Onset     Snoring Father      Review of Systems:  Constitutional: Negative except as noted in HPI.   Eyes: Negative except as noted in HPI.   ENT: Negative except as noted in HPI.   Cardiovascular: Negative except as noted in HPI.   Respiratory: Negative except as noted in HPI.   Gastrointestinal: Negative except as noted in HPI.   Genitourinary: Negative except as noted in HPI.   Musculoskeletal: Negative except as noted in HPI.   Integumentary: Negative except as noted in HPI.   Neurological: Negative except as noted in HPI.   Psychiatric: Negative except as noted in HPI.   Endocrine: Negative except as noted in HPI.   Hematologic/Lymphatic: Negative except as noted in HPI.      STOP BANG 3/20/2017   Do you snore loudly (louder than talking or loud enough to be heard through closed doors)? 0   Do you often feel tired, fatigued, or sleepy during daytime? 1   Has anyone observed you stop breathing in your sleep? 0   Do you have or are you being treated for high blood pressure? 1   BMI more than 35 kg/m2 1   Age over 50 years old? 1   Neck circumference greater than 16 inches? 1   Gender male? 1   Total Score 6   Epworths Sleepiness Scale 3/20/2017   Sitting and reading 2   Watching TV 3   Sitting, inactive in a public place (e.g. a theatre or a meeting) 0   As a passenger in a car for an hour without a break 0   Lying down to rest in the afternoon when circumstances permit 3   Sitting and talking to someone 0   Sitting quietly after a lunch without alcohol 0   In a car, while stopped for a few minutes in traffic 0   Total score 8   Rooming 3/20/2017   Usual bedtime 10   Sleep Latency 30 mn   Awakenings 2   Wake Up Time 4   Energy Drinks 0   Coffee 3   Cola 1   Difficulty falling asleep No   Difficulty staying asleep Yes   Excessive daytime tiredness Yes   Excessive daytime sleepiness Yes   Dozing off while driving No  "  Shift Worker No   Sleep Walking? No   Sleep Talking? No   Kicking or punching? No   Restless legs symptoms No       Physical Exam:  Visit Vitals     /82     Pulse 81     Ht 5' 10\" (1.778 m)     Wt (!) 255 lb 12.8 oz (116 kg)     SpO2 90%     BMI 36.7 kg/m2     BMI:Body mass index is 36.7 kg/(m^2).   GEN: NAD, obese  Head: Normocephalic.  EYES: PERRLA, EOMI  ENT: Oropharynx is clear, mallampatti class 4+ airway.   Nasal mucosa is moist without erythema  Neck : Thyroid is within normal limits. neck circ 17.5  CV: Regular rate and rhythm, S1 & S2 positive.    LUNGS: Bilateral breathsounds heard.   ABDOMEN: Positive bowel sounds in all quadrants, soft, no rebound or guarding  MUSCULOSKELETAL: Bilateral 1+ leg swelling  SKIN: warm, dry, no rashes  Neurological: Alert, oriented to time, place, and person.  Psych: normal mood, normal affect        Labs/Studies:     Lab Results   Component Value Date    WBC 7.1 07/26/2016    HGB 15.2 07/26/2016    HCT 48.1 07/26/2016    MCV 99 07/26/2016     07/26/2016         Chemistry        Component Value Date/Time     10/31/2016 1032    K 4.5 10/31/2016 1032     10/31/2016 1032    CO2 27 10/31/2016 1032    BUN 15 10/31/2016 1032    CREATININE 0.88 10/31/2016 1032     (H) 10/31/2016 1032        Component Value Date/Time    CALCIUM 9.5 10/31/2016 1032    ALKPHOS 86 04/01/2016 1012    AST 65 (H) 04/01/2016 1012    ALT 98 (H) 04/01/2016 1012    BILITOT 0.8 04/01/2016 1012            No results found for: FERRITIN  Lab Results   Component Value Date    TSH 1.3 11/17/2009         Assessment and Plan:  In summary Raf Dalton is a 66 y.o. year old male here for transfer of care.  1.  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  I will " therefore write him a prescription to receive new equipment and supplies.  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 The patient is receiving benefit from continuing pressure therapy.  2.  COPD  The patient uses his CPAP with oxygen bleed in.  3.  Hypersomnia  4.  Other sleep disturbance    Patient verbalized understanding of these issues, agrees with the plan and all questions were answered today. Patient was given an opportuntity to voice any other symptoms or concerns not listed above. Patient did not have any other symptoms or concerns.      Patient told to return in one week after the sleep study is interpreted. Patient instructed to stop at  to schedule appointment before leaving today.       Michel Veronica DO  Board Certified in Internal Medicine and Sleep Medicine  [facility].    (Note created with Dragon voice recognition and unintended spelling errors and word substitutions may occur)

## 2021-06-09 NOTE — PROGRESS NOTES
6 month follow-up, abdominal aortic aneurysm, measuring 5.1 cm based on CTA 09/2016. Aortic US prior to appt. No c/o of back or belly pain. Aortic US prior to appt.

## 2021-06-09 NOTE — TELEPHONE ENCOUNTER
Refill Approved    Rx renewed per Medication Renewal Policy. Medication was last renewed on 7/2/19, last OV 3/16/20.    Denise Medrano, [facility] Triage/Med Refill 6/20/2020     Requested Prescriptions   Pending Prescriptions Disp Refills     ACCU-CHEK SMARTVIEW TEST STRIP strips [Pharmacy Med Name: ACCU-CHEK SMARTVIEW STRIPS   Strip] 200 strip 3     Sig: TEST BLOOD SUGAR TWICE DAILY       Diabetic Supplies Refill Protocol Passed - 6/19/2020  3:04 PM        Passed - Visit with PCP or prescribing provider visit in last 6 months     Last office visit with prescriber/PCP: 3/16/2020 Bekah Castanon MD OR same dept: 3/16/2020 Bekah Castanon MD OR same specialty: 3/16/2020 Bekah Castanon MD  Last physical: 4/1/2019 Last MTM visit: Visit date not found   Next visit within 3 mo: Visit date not found  Next physical within 3 mo: Visit date not found  Prescriber OR PCP: Bekah Castanon MD  Last diagnosis associated with med order: 1. Diabetes mellitus type 2, uncontrolled (H)  - ACCU-CHEK SMARTVIEW TEST STRIP strips [Pharmacy Med Name: ACCU-CHEK SMARTVIEW STRIPS   Strip]; TEST BLOOD SUGAR TWICE DAILY  Dispense: 200 strip; Refill: 3    If protocol passes may refill for 12 months if within 3 months of last provider visit (or a total of 15 months).             Passed - A1C in last 6 months     Hemoglobin A1c   Date Value Ref Range Status   03/16/2020 5.6 3.5 - 6.0 % Final

## 2021-06-09 NOTE — PROGRESS NOTES
"HPI: Mr. Dalton presents for follow-up of his abdominal aortic aneurysm.  He has been doing well, with no significant abdominal or back pain.    Allergies, Medications, Social History, Past Medical History and Past Surgical History were reviewed and are noted in the chart.    Review of Systems - Negative except he has gained weight, which he is trying to lose.    Visit Vitals     /68 (Patient Site: Left Arm, Patient Position: Sitting, Cuff Size: Adult Regular)     Pulse 78     Temp 99  F (37.2  C) (Temporal)     Resp 14     Ht 5' 10\" (1.778 m)     Wt (!) 252 lb 4.8 oz (114.4 kg)     BMI 36.2 kg/m2     Body mass index is 36.2 kg/(m^2).    EXAM:  GENERAL: This is a morbidly obese male in no distress.      IMAGES:   I reviewed his ultrasound with the patient. The raw numbers show the aneurysm measures 5.5 cm, compared to 5.6 cm on the previous occasion.    Assessment/Plan: Mr. Dalton appears to be doing well.   The aneurysm is stable.  On the last occasion, which was in September of last year, we obtained a CTA which demonstrated that the aneurysm was actually 5.1 cm in diameter.  Given the stability of the ultrasound readings, I do not think we need to obtain another CTA at this time.  I discussed this at some length with Mr. Dalton.  I also reviewed his previous CTA, and pointed out that if we do stent the aneurysm he is at risk for right renal partial infarction as an accessory right renal artery would have to be sacrificed.  He understood the discussion.   He will return in 4 months for a repeat ultrasound.      Sanya Stark MD  "

## 2021-06-09 NOTE — TELEPHONE ENCOUNTER
Refill Approved    Rx renewed per Medication Renewal Policy. Medication was last renewed on 4/10/19, last OV 3/16/20.    Denise Medrano, [facility] Triage/Med Refill 7/18/2020     Requested Prescriptions   Pending Prescriptions Disp Refills     metFORMIN (GLUCOPHAGE-XR) 500 MG 24 hr tablet 360 tablet 3     Sig: TAKE 2 TABLETS TWICE DAILY       There is no refill protocol information for this order

## 2021-06-09 NOTE — PROGRESS NOTES
Order for Durable Medical Equipment was processed and equipment ordered.   DME provider: [facility]  Date Faxed: 03/20/2017  Ordering Provider: Dr. Veronica  Equipment ordered: CPAP

## 2021-06-10 NOTE — TELEPHONE ENCOUNTER
Refill Approved    Rx renewed per Medication Renewal Policy. Medication was last renewed on 7/18/20.    Nell Angel, [facility] Triage/Med Refill 8/18/2020     Requested Prescriptions   Pending Prescriptions Disp Refills     metFORMIN (GLUCOPHAGE-XR) 500 MG 24 hr tablet [Pharmacy Med Name: METFORMIN HYDROCHLORIDE  MG Tablet Extended Release 24 Hour] 360 tablet 0     Sig: TAKE 2 TABLETS TWICE DAILY       Metformin Refill Protocol Passed - 8/17/2020 11:02 AM        Passed - Blood pressure in last 12 months     BP Readings from Last 1 Encounters:   03/16/20 128/76             Passed - LFT or AST or ALT in last 12 months     Albumin   Date Value Ref Range Status   03/16/2020 3.7 3.5 - 5.0 g/dL Final     Bilirubin, Total   Date Value Ref Range Status   03/16/2020 0.7 0.0 - 1.0 mg/dL Final     Bilirubin, Direct   Date Value Ref Range Status   04/01/2016 0.3 <=0.5 mg/dL Final     Alkaline Phosphatase   Date Value Ref Range Status   03/16/2020 77 45 - 120 U/L Final     AST   Date Value Ref Range Status   03/16/2020 16 0 - 40 U/L Final     ALT   Date Value Ref Range Status   03/16/2020 16 0 - 45 U/L Final     Protein, Total   Date Value Ref Range Status   03/16/2020 6.8 6.0 - 8.0 g/dL Final                Passed - GFR or Serum Creatinine in last 6 months     GFR MDRD Non Af Amer   Date Value Ref Range Status   03/16/2020 >60 >60 mL/min/1.73m2 Final     GFR MDRD Af Amer   Date Value Ref Range Status   03/16/2020 >60 >60 mL/min/1.73m2 Final             Passed - Visit with PCP or prescribing provider visit in last 6 months or next 3 months     Last office visit with prescriber/PCP: 3/16/2020 OR same dept: 3/16/2020 Bekah Castanon MD OR same specialty: 3/16/2020 Beakh Castanon MD Last physical: Visit date not found Last MTM visit: Visit date not found         Next appt within 3 mo: Visit date not found  Next physical within 3 mo: Visit date not found  Prescriber OR PCP: Bekah Castanon MD  Last diagnosis associated  with med order: 1. Type 2 diabetes mellitus (H)  - metFORMIN (GLUCOPHAGE-XR) 500 MG 24 hr tablet [Pharmacy Med Name: METFORMIN HYDROCHLORIDE  MG Tablet Extended Release 24 Hour]; TAKE 2 TABLETS TWICE DAILY  Dispense: 360 tablet; Refill: 0     If protocol passes may refill for 12 months if within 3 months of last provider visit (or a total of 15 months).           Passed - A1C in last 6 months     Hemoglobin A1c   Date Value Ref Range Status   03/16/2020 5.6 3.5 - 6.0 % Final               Passed - Microalbumin in last year      Microalbumin, Random Urine   Date Value Ref Range Status   11/04/2019 0.85 0.00 - 1.99 mg/dL Final

## 2021-06-10 NOTE — TELEPHONE ENCOUNTER
Refill Approved    Rx renewed per Medication Renewal Policy. Medication was last renewed on 10/16/19.    Nell Angel, [facility] Triage/Med Refill 8/25/2020     Requested Prescriptions   Pending Prescriptions Disp Refills     lisinopriL (PRINIVIL,ZESTRIL) 20 MG tablet [Pharmacy Med Name: LISINOPRIL 20 MG Tablet] 180 tablet 3     Sig: TAKE 1 TABLET TWICE DAILY FOR BLOOD PRESSURE       Ace Inhibitors Refill Protocol Passed - 8/23/2020 12:26 AM        Passed - PCP or prescribing provider visit in past 12 months       Last office visit with prescriber/PCP: 3/16/2020 Bekah Castanon MD OR same dept: 3/16/2020 Bekah Castanon MD OR same specialty: 3/16/2020 Bekah Castanon MD  Last physical: 4/1/2019 Last MTM visit: Visit date not found   Next visit within 3 mo: Visit date not found  Next physical within 3 mo: Visit date not found  Prescriber OR PCP: Bekah Castanon MD  Last diagnosis associated with med order: 1. Essential hypertension  - lisinopriL (PRINIVIL,ZESTRIL) 20 MG tablet [Pharmacy Med Name: LISINOPRIL 20 MG Tablet]; TAKE 1 TABLET TWICE DAILY FOR BLOOD PRESSURE  Dispense: 180 tablet; Refill: 3    If protocol passes may refill for 12 months if within 3 months of last provider visit (or a total of 15 months).             Passed - Serum Potassium in past 12 months     Lab Results   Component Value Date    Potassium 4.3 03/16/2020             Passed - Blood pressure filed in past 12 months     BP Readings from Last 1 Encounters:   03/16/20 128/76             Passed - Serum Creatinine in past 12 months     Creatinine   Date Value Ref Range Status   03/16/2020 0.80 0.70 - 1.30 mg/dL Final

## 2021-06-10 NOTE — PROGRESS NOTES
Assessment:     Raf was seen today for diabetes.    Diagnoses and all orders for this visit:    Essential hypertension    COPD (chronic obstructive pulmonary disease) with emphysema  -     albuterol (PROAIR HFA;PROVENTIL HFA;VENTOLIN HFA) 90 mcg/actuation inhaler; Inhale 2 puffs every 6 (six) hours as needed for wheezing.    Obstructive Sleep Apnea    Type 2 DM with CKD and hypertension    Peripheral neuropathy        Plan:     1. Essential hypertension  Stable blood pressure readings continue on blood pressure meds Hytrin and lisinopril    2. COPD (chronic obstructive pulmonary disease) with emphysema  Residual wheezing needs to improve on steroid and singular inhalers.  Follow-up with pulmonary in 1 month    3. Obstructive Sleep Apnea  Troubles with sleep mask especially chin strap adjustment.  Will review with respiratory therapist at [facility]    4. Type 2 DM with CKD and hypertension  Patient is not due for an A1c rechecked today he is due in a month.  We will bring him back at about 6 weeks timeframe as he will be closer to needing his long-term meds refilled.  Foot exam is abnormal, and I refer again to [place]  for eye exam.      This is a 25 minute visit with greater than 50% of the time spent counseling regarding education regarding all the above problems particularly the sleep apnea issues and next to the residual wheezing.  We are able to check his O2 sats and find that there about 93% with walking in the clinic.      Subjective:      Yohana is a 66 y.o. male presenting to my clinic for evaluation of multiple issues.  Patient has a triple aortic aneurysm and sees Dr. Sun for monitoring of that size.  At some point he will be ready for aneurysm repair.. Aneurysm girth has remained stable  He also has COPD and requires oxygen at night.  He has COPD and sleep apnea.  He is having trouble adjusting chinstrap to keep his mouth closed at night.  He will consult again with the sleep study  "people.  He is compliant with using his CPAP for the most part.  Patient also has issues with forgetting to take his inhalers.  He takes his albuterol inhaler\" when he needs it but he has been forgetting to take the steroid inhaler and the singular inhaler.  He takes them irregularly at this point  Patient continues to be compliant with no smoking status, but his wife smokes and so that is an issue for him.  He is gained a little weight this last time.  He says his diabetes is fairly well controlled and is using the glipizide diabetic med and the metformin as directed.  He also is taking the 2 blood pressure meds as directed.  He has a new complaint of sweating at times and this prompts us to check his O2 sats at that return at 93%      Current Outpatient Prescriptions on File Prior to Visit   Medication Sig Dispense Refill     ACCU-CHEK FASTCLIX USE TO TEST TWICE DAILY 204 each 1     aspirin 81 MG EC tablet Take 1 tablet (81 mg total) by mouth daily.  0     blood glucose test (ACCU-CHEK SMARTVIEW TEST STRIP) strips Use 1 each As Directed 2 (two) times a day. Dispense brand per patient's insurance at pharmacy discretion. 100 strip 2     budesonide-formoterol (SYMBICORT) 80-4.5 mcg/actuation inhaler Inhale 2 puffs 2 (two) times a day. 1 Inhaler 12     buPROPion (WELLBUTRIN SR) 150 MG 12 hr tablet TAKE 1 TABLET BY MOUTH TWICE DAILY 180 tablet 3     cholecalciferol, vitamin D3, (VITAMIN D3) 5,000 unit Tab Take 1 tablet (5,000 Units total) by mouth daily. 90 tablet 3     glipiZIDE (GLUCOTROL) 5 MG tablet Take 1 tablet (5 mg total) by mouth 2 (two) times a day before meals. 1/2 Hour BEFORE meals 60 tablet 11     lisinopril (PRINIVIL,ZESTRIL) 20 MG tablet TAKE 1 TABLET BY MOUTH TWICE DAILY FOR BLOOD PRESSURE 180 tablet 3     metFORMIN (GLUCOPHAGE XR) 500 MG 24 hr tablet Take 1 tablet (500 mg total) by mouth 2 (two) times a day. 60 tablet 11     simvastatin (ZOCOR) 40 MG tablet TAKE 1 AND 1/2 TABLETS BY MOUTH EVERY  " tablet 2     SPIRIVA WITH HANDIHALER 18 mcg inhalation capsule INHALE CONTENTS OF 1 CAPSULE DAILY 30 capsule 5     terazosin (HYTRIN) 2 MG capsule Take 2 capsules (4 mg total) by mouth daily. 180 capsule 3     testosterone 2 mg/24 hour PT24 Place 1 patch on the skin daily. 30 patch 3     No current facility-administered medications on file prior to visit.      No Known Allergies  Past Medical History:   Diagnosis Date     Aneurysm Of The Abdominal Aorta     Created by Conversion      Benign Prostatic Hypertrophy     Created by Conversion      Benign Prostatic Hypertrophy With Urinary Obstruction     Created by Conversion      Cataract     Created by Conversion      Cervicalgia     Created by Conversion      Chronic Obstructive Pulmonary Disease     Created by Conversion      Hyperglycemia     Created by Conversion      Hyperlipidemia     Created by Conversion      Hypertension     Created by Conversion      Hypoxia     Created by Conversion      Lower Back Sprain     Created by Conversion      Obstructive Sleep Apnea     Created by Conversion      Vitamin D Deficiency     Created by Conversion      Past Surgical History:   Procedure Laterality Date     FINGER SURGERY       Social History     Social History     Marital status:      Spouse name: N/A     Number of children: N/A     Years of education: N/A     Occupational History     Not on file.     Social History Main Topics     Smoking status: Former Smoker     Packs/day: 0.25     Years: 54.00     Types: Cigarettes     Quit date: 7/26/2016     Smokeless tobacco: Former User     Alcohol use 15.0 oz/week     30 Standard drinks or equivalent per week      Comment: 21 beers per week     Drug use: No     Sexual activity: No     Other Topics Concern     Not on file     Social History Narrative     Family History   Problem Relation Age of Onset     Snoring Father        ROS:  I have performed a 10 point ROS.  All pertinent positives and negatives are found in the  HPI.  All others are negative.    Struggles with compliance to inhalers.  New problem of unexplained sweating    Objective:     Physical Exam:  /84 (Patient Site: Left Arm, Patient Position: Sitting, Cuff Size: Adult Large)  Pulse 80  Wt (!) 258 lb 3.2 oz (117.1 kg)  BMI 37.05 kg/m2  General Appearance: Alert, cooperative, no distress, appears stated age  Head: Normocephalic, without obvious abnormality, atraumatic  Lungs: Wheeze on end expiration- fine- to auscultation bilaterally, respirations unlabored  Heart: Regular rate and rhythm, S1 and S2 normal, no murmur, rub, or gallop,     Abdomen: Soft, non-tender, bowel sounds active all four quadrants,  no masses, no organomegaly    Extremities: Extremities normal, atraumatic, no cyanosis or edema  Neurologic: Intact, no  has decrease sensory perception on bilateral toes.  Mental status:  Appropriate, Affect normal    Labs:  Last A1c was 7.2  Blood sugar readings range between 120 and 170 occasionally 200.  Oxygen oh I have is COPD or uses oxygen at night    Imaging: Scans of aortic aneurysm reviewed  Notes from Dr. Stark and pulmonary reviewed

## 2021-06-11 NOTE — TELEPHONE ENCOUNTER
Refill Approved    Rx renewed per Medication Renewal Policy. Medication was last renewed on 10/15/19.    Nell Angel, [facility] Triage/Med Refill 9/7/2020     Requested Prescriptions   Pending Prescriptions Disp Refills     terazosin (HYTRIN) 10 MG capsule [Pharmacy Med Name: TERAZOSIN HCL 10 MG Capsule] 180 capsule 3     Sig: TAKE 2 CAPSULES AT BEDTIME       Alpha Blockers Refill Protocol  Passed - 9/4/2020  6:26 PM        Passed - PCP or prescribing provider visit in past 12 months       Last office visit with prescriber/PCP: Visit date not found OR same dept: 3/16/2020 Bekah Castanon MD OR same specialty: 3/16/2020 Bekah Castanon MD  Last physical: Visit date not found Last MTM visit: Visit date not found   Next visit within 3 mo: Visit date not found  Next physical within 3 mo: Visit date not found  Prescriber OR PCP: Fifi Driscoll MD  Last diagnosis associated with med order: 1. Benign prostatic hyperplasia with urinary frequency  - terazosin (HYTRIN) 10 MG capsule [Pharmacy Med Name: TERAZOSIN HCL 10 MG Capsule]; TAKE 2 CAPSULES AT BEDTIME  Dispense: 180 capsule; Refill: 3    If protocol passes may refill for 12 months if within 3 months of last provider visit (or a total of 15 months).             Passed - Blood pressure filed in past 12 months     BP Readings from Last 1 Encounters:   03/16/20 128/76

## 2021-06-11 NOTE — PROGRESS NOTES
Dear Dr. Analisa Katz MD  [address]  [place] [address],    Thank you for the opportunity to participate in the care of Raf Dalton.     He is a 67 y.o. y/o male patient who comes to the sleep medicine clinic for follow up.  Patient has dramatically improved his usage since his last visit with me. However, he continues to feel tired upon awakening even with the oxygen bleed in. Patient states that his current mask is ill fitting and has caused some irritation.  I strongly advised patient to follow-up with his durable medical equipment company to address the mask fit issue as soon as possible.    Compliance Download data for 30 Days:  Pressure settin-16 cwp  Residual AHI:0.5 events per hour  Leak:Minimal  Compliance:97%  Mask Tolerance:Poor  Skin irritation:Yes.      Past Medical History:   Diagnosis Date     Aneurysm Of The Abdominal Aorta     Created by Conversion      Benign Prostatic Hypertrophy     Created by Conversion      Benign Prostatic Hypertrophy With Urinary Obstruction     Created by Conversion      Cataract     Created by Conversion      Cervicalgia     Created by Conversion      Chronic Obstructive Pulmonary Disease     Created by Conversion      Hyperglycemia     Created by Conversion      Hyperlipidemia     Created by Conversion      Hypertension     Created by Conversion      Hypoxia     Created by Conversion      Lower Back Sprain     Created by Conversion      Obstructive Sleep Apnea     Created by Conversion      Vitamin D Deficiency     Created by Conversion        Past Surgical History:   Procedure Laterality Date     FINGER SURGERY         Social History     Social History     Marital status:      Spouse name: N/A     Number of children: N/A     Years of education: N/A     Occupational History     Not on file.     Social History Main Topics     Smoking status: Former Smoker     Packs/day: 0.25     Years: 54.00     Types: Cigarettes     Quit date: 2016      Smokeless tobacco: Former User     Alcohol use 15.0 oz/week     30 Standard drinks or equivalent per week      Comment: 21 beers per week     Drug use: No     Sexual activity: No     Other Topics Concern     Not on file     Social History Narrative         Current Outpatient Prescriptions   Medication Sig Dispense Refill     albuterol (PROAIR HFA;PROVENTIL HFA;VENTOLIN HFA) 90 mcg/actuation inhaler Inhale 2 puffs every 6 (six) hours as needed for wheezing. 1 each 4     aspirin 81 MG EC tablet Take 1 tablet (81 mg total) by mouth daily.  0     blood glucose test (ACCU-CHEK SMARTVIEW TEST STRIP) strips Use 1 each As Directed 2 (two) times a day. Dispense brand per patient's insurance at pharmacy discretion. 200 strip 0     budesonide-formoterol (SYMBICORT) 80-4.5 mcg/actuation inhaler Inhale 2 puffs 2 (two) times a day. 1 Inhaler 12     buPROPion (WELLBUTRIN SR) 150 MG 12 hr tablet TAKE 1 TABLET BY MOUTH TWICE DAILY 180 tablet 2     cholecalciferol, vitamin D3, (VITAMIN D3) 5,000 unit Tab Take 1 tablet (5,000 Units total) by mouth daily. 90 tablet 0     cholecalciferol, vitamin D3, 5,000 unit Tab TAKE ONE TABLET(5,000 UNITS TOTAL) BY MOUTH DAILY 90 tablet 0     generic lancets (ACCU-CHEK FASTCLIX) USE TO TEST TWICE DAILY 200 each 0     glipiZIDE (GLUCOTROL) 5 MG tablet Take 1 tablet (5 mg total) by mouth 2 (two) times a day before meals. 1/2 Hour BEFORE meals 60 tablet 11     lisinopril (PRINIVIL,ZESTRIL) 20 MG tablet TAKE 1 TABLET BY MOUTH TWICE DAILY FOR BLOOD PRESSURE 180 tablet 0     metFORMIN (GLUCOPHAGE XR) 500 MG 24 hr tablet Take 1 tablet (500 mg total) by mouth 2 (two) times a day. 60 tablet 11     simvastatin (ZOCOR) 40 MG tablet TAKE 1 AND 1/2 TABLETS BY MOUTH EVERY  tablet 0     terazosin (HYTRIN) 2 MG capsule Take 2 capsules (4 mg total) by mouth daily. 180 capsule 0     tiotropium (SPIRIVA WITH HANDIHALER) 18 mcg inhalation capsule INHALE CONTENTS OF 1 CAPSULE DAILY 30 capsule 5     testosterone  "(ANDRODERM) 2 mg/24 hour PT24 patch Place 1 patch on the skin daily. 60 patch 1     No current facility-administered medications for this visit.        No Known Allergies    Physical Exam:  /68  Pulse 82  Ht 5' 10\" (1.778 m)  Wt (!) 255 lb 14.4 oz (116.1 kg)  SpO2 91%  BMI 36.72 kg/m2  BMI:Body mass index is 36.72 kg/(m^2).   GEN: NAD, obese  Psych: normal mood, normal affect    Labs/Studies:     I reviewed the efficacy and compliance report from his device. Data summarized on the HPI and the CPAP compliance flow sheet.     Lab Results   Component Value Date    WBC 7.1 07/26/2016    HGB 15.2 07/26/2016    HCT 48.1 07/26/2016    MCV 99 07/26/2016     07/26/2016         Chemistry        Component Value Date/Time     10/31/2016 1032    K 4.5 10/31/2016 1032     10/31/2016 1032    CO2 27 10/31/2016 1032    BUN 15 10/31/2016 1032    CREATININE 0.88 10/31/2016 1032     (H) 10/31/2016 1032        Component Value Date/Time    CALCIUM 9.5 10/31/2016 1032    ALKPHOS 86 04/01/2016 1012    AST 65 (H) 04/01/2016 1012    ALT 98 (H) 04/01/2016 1012    BILITOT 0.8 04/01/2016 1012            No results found for: FERRITIN         Assessment and Plan:  In summary Raf Dalton is a 67 y.o. year old male who is here for review of his compliance download.    1.  Obstructive sleep apnea on CPAP  I congratulated the patient on his excellent usage.  I will narrow his CPAP pressure range to 9-13 CWP.  I had him test out this new pressure setting in front of me and he felt it was comfortable.  I advised him to follow-up with his durable medical equipment company as soon as possible to address his ill fitting mask.  2.  Hypersomnia  I recommended that the patient nap with his machine on a daily basis and reevaluate in 3 months to see if this improves.  3.  Sleep-related hypoxia  Continue with nocturnal oxygen bleeding with CPAP therapy.       Patient verbalized understanding of these issues, agrees with " the plan and all questions were answered today. Patient was given an opportuntity to voice any other symptoms or concerns not listed above. Patient did not have any other symptoms or concerns.      Patient told to return in 12 months. Patient instructed to stop at  to schedule appointment before leaving today.    Michel Veronica DO  Board Certified in Internal Medicine and Sleep Medicine  [facility].    I spent a total of 15 minutes of face-to-face encounter and more than 50% of the encounter was used for counseling or coordination of care.    (Note created with Dragon voice recognition and unintended spelling errors and word substitutions may occur)

## 2021-06-11 NOTE — PROGRESS NOTES
Dear Dr. Analisa Katz MD  [address]  [place] [address],    Thank you for the opportunity to participate in the care of Raf Dalton.     He is a 67 y.o. y/o male patient who comes to the sleep medicine clinic for follow up.  This is the patient's 1st clinical visit since getting a new CPAP machine.  He states that he does like the machine and is using a connector to bleed oxygen through.  The only complaint that he has is waking up with dry mouth.  I advised him to use the strap and consider increasing doing humidification.    Compliance Download data for 30 Days:  Pressure settin-16 cwp  Residual AHI:0.5 events per hour  Leak:Minimal  Compliance:10%  Mask Tolerance:Good  Skin irritation:None    Past Medical History:   Diagnosis Date     Aneurysm Of The Abdominal Aorta     Created by Conversion      Benign Prostatic Hypertrophy     Created by Conversion      Benign Prostatic Hypertrophy With Urinary Obstruction     Created by Conversion      Cataract     Created by Conversion      Cervicalgia     Created by Conversion      Chronic Obstructive Pulmonary Disease     Created by Conversion      Hyperglycemia     Created by Conversion      Hyperlipidemia     Created by Conversion      Hypertension     Created by Conversion      Hypoxia     Created by Conversion      Lower Back Sprain     Created by Conversion      Obstructive Sleep Apnea     Created by Conversion      Vitamin D Deficiency     Created by Conversion        Past Surgical History:   Procedure Laterality Date     FINGER SURGERY         Social History     Social History     Marital status:      Spouse name: N/A     Number of children: N/A     Years of education: N/A     Occupational History     Not on file.     Social History Main Topics     Smoking status: Former Smoker     Packs/day: 0.25     Years: 54.00     Types: Cigarettes     Quit date: 2016     Smokeless tobacco: Former User     Alcohol use 15.0 oz/week     30  "Standard drinks or equivalent per week      Comment: 21 beers per week     Drug use: No     Sexual activity: No     Other Topics Concern     Not on file     Social History Narrative         Current Outpatient Prescriptions   Medication Sig Dispense Refill     ACCU-CHEK FASTCLIX USE TO TEST TWICE DAILY 204 each 1     albuterol (PROAIR HFA;PROVENTIL HFA;VENTOLIN HFA) 90 mcg/actuation inhaler Inhale 2 puffs every 6 (six) hours as needed for wheezing. 1 each 6     aspirin 81 MG EC tablet Take 1 tablet (81 mg total) by mouth daily.  0     blood glucose test (ACCU-CHEK SMARTVIEW TEST STRIP) strips Use 1 each As Directed 2 (two) times a day. Dispense brand per patient's insurance at pharmacy discretion. 100 strip 2     budesonide-formoterol (SYMBICORT) 80-4.5 mcg/actuation inhaler Inhale 2 puffs 2 (two) times a day. 1 Inhaler 12     buPROPion (WELLBUTRIN SR) 150 MG 12 hr tablet TAKE 1 TABLET BY MOUTH TWICE DAILY 180 tablet 3     cholecalciferol, vitamin D3, (VITAMIN D3) 5,000 unit Tab Take 1 tablet (5,000 Units total) by mouth daily. 90 tablet 3     glipiZIDE (GLUCOTROL) 5 MG tablet Take 1 tablet (5 mg total) by mouth 2 (two) times a day before meals. 1/2 Hour BEFORE meals 60 tablet 11     lisinopril (PRINIVIL,ZESTRIL) 20 MG tablet TAKE 1 TABLET BY MOUTH TWICE DAILY FOR BLOOD PRESSURE 180 tablet 3     metFORMIN (GLUCOPHAGE XR) 500 MG 24 hr tablet Take 1 tablet (500 mg total) by mouth 2 (two) times a day. 60 tablet 11     simvastatin (ZOCOR) 40 MG tablet TAKE 1 AND 1/2 TABLETS BY MOUTH EVERY  tablet 2     SPIRIVA WITH HANDIHALER 18 mcg inhalation capsule INHALE CONTENTS OF 1 CAPSULE DAILY 30 capsule 5     terazosin (HYTRIN) 2 MG capsule Take 2 capsules (4 mg total) by mouth daily. 180 capsule 3     testosterone 2 mg/24 hour PT24 Place 1 patch on the skin daily. 30 patch 3     No current facility-administered medications for this visit.        No Known Allergies    Physical Exam:  /70  Pulse 61  Ht 5' 10\" " (1.778 m)  Wt (!) 255 lb (115.7 kg)  SpO2 (!) 89%  BMI 36.59 kg/m2  BMI:Body mass index is 36.59 kg/(m^2).   GEN: NAD, obese  Psych: normal mood, normal affect    Labs/Studies:     I reviewed the efficacy and compliance report from his device. Data summarized on the HPI and the CPAP compliance flow sheet.     Lab Results   Component Value Date    WBC 7.1 07/26/2016    HGB 15.2 07/26/2016    HCT 48.1 07/26/2016    MCV 99 07/26/2016     07/26/2016         Chemistry        Component Value Date/Time     10/31/2016 1032    K 4.5 10/31/2016 1032     10/31/2016 1032    CO2 27 10/31/2016 1032    BUN 15 10/31/2016 1032    CREATININE 0.88 10/31/2016 1032     (H) 10/31/2016 1032        Component Value Date/Time    CALCIUM 9.5 10/31/2016 1032    ALKPHOS 86 04/01/2016 1012    AST 65 (H) 04/01/2016 1012    ALT 98 (H) 04/01/2016 1012    BILITOT 0.8 04/01/2016 1012            No results found for: FERRITIN         Assessment and Plan:  In summary Raf Dalton is a 67 y.o. year old male who is here for review of his compliance download data.    1.  Obstructive sleep apnea on CPAP  I advised patient to increase his hours of usage to the best of his ability.  He is to continue with oxygen bleed in as well.  I will hold off on making any adjustments on his pressure range due to the fact that his hours of usage are still low.  2.  Hypersomnia  3.  Other sleep disturbance    We counseled the patient on the importannce of using his CPAP device every night and the risks of not treating sleep apnea.      Patient verbalized understanding of these issues, agrees with the plan and all questions were answered today. Patient was given an opportuntity to voice any other symptoms or concerns not listed above. Patient did not have any other symptoms or concerns.      Patient told to return in 1 month. Patient instructed to stop at  to schedule appointment before leaving today.    Michel Yarbrough  Certified in Internal Medicine and Sleep Medicine  [facility].    I spent a total of 15 minutes of face-to-face encounter and more than 50% of the encounter was used for counseling or coordination of care.    (Note created with Dragon voice recognition and unintended spelling errors and word substitutions may occur)        room air

## 2021-06-11 NOTE — TELEPHONE ENCOUNTER
Refill Approved    Rx renewed per Medication Renewal Policy. Medication was last renewed on 4/17/19, last OV 3/16/20.    Denise Medrano, [facility] Triage/Med Refill 9/19/2020     Requested Prescriptions   Pending Prescriptions Disp Refills     traZODone (DESYREL) 50 MG tablet [Pharmacy Med Name: TRAZODONE HYDROCHLORIDE 50 MG Tablet] 90 tablet 0     Sig: TAKE 1 TABLET AT BEDTIME AS NEEDED FOR SLEEP       Tricyclics/Misc Antidepressant/Antianxiety Meds Refill Protocol Passed - 9/17/2020  5:59 PM        Passed - PCP or prescribing provider visit in last year     Last office visit with prescriber/PCP: Visit date not found OR same dept: 3/16/2020 Bekah Castanon MD OR same specialty: 3/16/2020 Bekah Castanon MD  Last physical: Visit date not found Last MTM visit: Visit date not found   Next visit within 3 mo: Visit date not found  Next physical within 3 mo: Visit date not found  Prescriber OR PCP: Fifi Driscoll MD  Last diagnosis associated with med order: 1. Chronic obstructive pulmonary disease, unspecified COPD type (H)  - traZODone (DESYREL) 50 MG tablet [Pharmacy Med Name: TRAZODONE HYDROCHLORIDE 50 MG Tablet]; TAKE 1 TABLET AT BEDTIME AS NEEDED FOR SLEEP  Dispense: 90 tablet; Refill: 0    If protocol passes may refill for 12 months if within 3 months of last provider visit (or a total of 15 months).

## 2021-06-12 NOTE — TELEPHONE ENCOUNTER
RN cannot approve Refill Request    RN can NOT refill this medication med is not covered by policy/route to provider. Last office visit: 3/16/2020 Bekah Castanon MD Last Physical: 4/1/2019 Last MTM visit: Visit date not found Last visit same specialty: Visit date not found.  Next visit within 3 mo: Visit date not found  Next physical within 3 mo: Visit date not found      Analisa Suazo, [facility] Triage/Med Refill 10/17/2020    Requested Prescriptions   Pending Prescriptions Disp Refills     metFORMIN (GLUCOPHAGE-XR) 500 MG 24 hr tablet [Pharmacy Med Name: METFORMIN ER 500MG 24HR TABS] 360 tablet 0     Sig: TAKE 2 TABLETS BY MOUTH TWICE DAILY       Metformin Refill Protocol Failed - 10/14/2020 10:05 AM        Failed - Visit with PCP or prescribing provider visit in last 6 months or next 3 months     Last office visit with prescriber/PCP: Visit date not found OR same dept: Visit date not found OR same specialty: Visit date not found Last physical: Visit date not found Last MTM visit: Visit date not found         Next appt within 3 mo: Visit date not found  Next physical within 3 mo: Visit date not found  Prescriber OR PCP: Bekah Castanon MD  Last diagnosis associated with med order: 1. Type 2 diabetes mellitus (H)  - metFORMIN (GLUCOPHAGE-XR) 500 MG 24 hr tablet [Pharmacy Med Name: METFORMIN ER 500MG 24HR TABS]; TAKE 2 TABLETS BY MOUTH TWICE DAILY  Dispense: 360 tablet; Refill: 0     If protocol passes may refill for 12 months if within 3 months of last provider visit (or a total of 15 months).           Failed - A1C in last 6 months     Hemoglobin A1c   Date Value Ref Range Status   03/16/2020 5.6 3.5 - 6.0 % Final               Passed - Blood pressure in last 12 months     BP Readings from Last 1 Encounters:   03/16/20 128/76             Passed - LFT or AST or ALT in last 12 months     Albumin   Date Value Ref Range Status   03/16/2020 3.7 3.5 - 5.0 g/dL Final     Bilirubin, Total   Date Value Ref Range  Status   03/16/2020 0.7 0.0 - 1.0 mg/dL Final     Bilirubin, Direct   Date Value Ref Range Status   04/01/2016 0.3 <=0.5 mg/dL Final     Alkaline Phosphatase   Date Value Ref Range Status   03/16/2020 77 45 - 120 U/L Final     AST   Date Value Ref Range Status   03/16/2020 16 0 - 40 U/L Final     ALT   Date Value Ref Range Status   03/16/2020 16 0 - 45 U/L Final     Protein, Total   Date Value Ref Range Status   03/16/2020 6.8 6.0 - 8.0 g/dL Final                Passed - GFR or Serum Creatinine in last 6 months     GFR MDRD Non Af Amer   Date Value Ref Range Status   03/16/2020 >60 >60 mL/min/1.73m2 Final     GFR MDRD Af Amer   Date Value Ref Range Status   03/16/2020 >60 >60 mL/min/1.73m2 Final             Passed - Microalbumin in last year      Microalbumin, Random Urine   Date Value Ref Range Status   11/04/2019 0.85 0.00 - 1.99 mg/dL Final

## 2021-06-12 NOTE — TELEPHONE ENCOUNTER
Yes-- [facility] will be out of network for patients with [employer] Medicare Advantage Plans starting January 1, 2021.   I do not know all the details but he could call his insurance for more information.     If he would like to look at changing to a plan with in-network benefit coverage in 2021, he can visit [email or web address] for a listing of in-network plans.

## 2021-06-12 NOTE — TELEPHONE ENCOUNTER
Reason contacted:  Insurance question  Information relayed:  Below message  Additional questions:  No  Further follow-up needed:  No  Okay to leave a detailed message:  No

## 2021-06-12 NOTE — TELEPHONE ENCOUNTER
Refill sent today. Pt due for office visit before any further refills can be given. Please call to schedule

## 2021-06-12 NOTE — PATIENT INSTRUCTIONS - HE
Dr. Anthony Marcos [facility] (not part of [facility])    Dr. Brian (vascular surgeon--part of [facility])    Dr. Caballero (lung doctor--part of [facility])    Dr. Norris (sleep doctor-part of [facility]_

## 2021-06-12 NOTE — PROGRESS NOTES
6 month follow-up, abdominal aortic aneurysm, prior  CTA on 09/2016 measured AAA @ 5.1. Aortic US 03/09/17 measured aneurysm @ 5.2 x 5.5 cm. Repeat CTa done 07/10/17 shows AAA measuring 5.1 CM. No c/o of back or belly pain, b/p per patient reports is controlled. ASA and Zocor, no beta blocker

## 2021-06-12 NOTE — TELEPHONE ENCOUNTER
Reason contacted:  Results   Information relayed:  Below message relayed to patient.     He reports there is a problem with his metformin at the pharmacy sent on 10/29/2020. Called [facility] and they states this prescription will not go through insurance because it is too soon for refill. Patient apparently received a 90 day supply of metformin on 9/24/2020. I called and notified the patient of this information.       - - Patient is wondering if there is some sort of medication or supplement that he could take to help improve memory or prevent memory loss.  Caryl do you know of any medications or supplements to help with this? - -     Additional questions:  No  Further follow-up needed:  No  Okay to leave a detailed message:  No

## 2021-06-12 NOTE — PROGRESS NOTES
"HPI: Mr. Dalton presents for follow-up of his abdominal aortic aneurysm.  He has been doing well, with no significant abdominal or back pain.    Allergies, Medications, Social History, Past Medical History and Past Surgical History were reviewed and are noted in the chart.      /78  Pulse 78  Resp 14  Ht 5' 10\" (1.778 m)  Wt (!) 255 lb (115.7 kg)  BMI 36.59 kg/m2  Body mass index is 36.59 kg/(m^2).    EXAM:  GENERAL: This is an obese male in no distress.        IMAGES:   Cta Abdomen Pelvis    Result Date: 7/10/2017  CTA ABDOMEN PELVIS 7/10/2017 9:40 AM INDICATION: Aneurysm aorta (AAA), branches TECHNIQUE: Helical acquisition through the abdomen and pelvis was performed during the arterial phase of contrast enhancement. 2D and 3D reconstructions were performed by the CT technologist. Dose reduction techniques were used. IV CONTRAST: Iohexol (Omni) 100 mL COMPARISON: CTA abdomen, 9/16/2016, aortic ultrasound, 3/9/2017 FINDINGS: ANGIOGRAM ABDOMEN/PELVIS: Infrarenal abdominal aortic aneurysm is unchanged in size with the aneurysmal sac measuring a maximum dimension of 5.1 cm. The neck of the aneurysm measures 2.5 cm in maximum diameter. The neck measures 2.8 cm in length. Bilateral main renal arteries are patent. Accessory right renal artery coming off anterolateral aspect of the mid segment of the aneurysmal sac. Bilateral common iliac arteries, external iliac arteries and internal iliac arteries are patent. Bilateral common femoral arteries are patent. Right common iliac artery measures 2.2 cm in maximum diameter. Left common iliac artery measures 1.6 cm in maximum diameter. The celiac artery and SMA are patent. LUNG BASES: Negative. ABDOMEN: Liver and spleen are normal. No calcified gallstones. Pancreas and adrenal glands are normal. Kidneys have normal size and contour without solid renal mass. No hydronephrosis. IVC has normal size and contour. No abdominal adenopathy or free fluid. PELVIS: Urinary bladder " is normal. No dilated loops of large or small bowel. No pericolonic fat stranding. Colonic diverticulosis. No pelvic free fluid or adenopathy. MUSCULOSKELETAL: Negative     CONCLUSION: 1.  Infrarenal abdominal aortic aneurysm is unchanged in size with the aneurysmal sac measuring a maximum dimension of 5.1 cm, previously measuring 5.1 cm on CTA abdomen dated 9/16/2016. CT is a more accurate assessment of aneurysmal size compared to previous aortic ultrasound on 3/9/2017. 2.  The infrarenal abdominal aortic aneurysm is amenable to endovascular aortic aneurysm repair. There is an accessory right renal artery supplying the lower pole of the right kidney coming off of the aneurysmal sac. 3.  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      Assessment/Plan: Mr. Dalton appears to be doing well.   The aneurysm is still too small to require intervention.   He will return in 6 months for a repeat ultrasound.      Sanya Stark MD

## 2021-06-12 NOTE — TELEPHONE ENCOUNTER
Who is calling:  Patient  Reason for Call:  Stating he received letter in the mail from [facility] that his primary physician will no longer be able to see him because visits may not be covered by his insurance. Would like further explanation regarding this. Concerned about having to switch to a new physician. Patient has appointment next week, could discuss this further in detail then. Would like a call back either way.  Date of last appointment with primary care: 3/16/2020  Okay to leave a detailed message: Yes

## 2021-06-12 NOTE — TELEPHONE ENCOUNTER
----- Message from Bekah Castanon MD sent at 11/3/2020  8:40 AM CST -----  Please contact pt with this message: Your diabetes is under good control. Your cholesterol levels are good. Your kidney and liver function tests are good. Your B12 level is low. I recommend that you start a daily B12 supplement. This may help improve your fatigue and some of your neuropathy.  I will send this to your pharmacy.  Your testosterone level is at the lower end of the normal range.

## 2021-06-12 NOTE — PROGRESS NOTES
Assessment/Plan:     1. Type 2 diabetes mellitus with diabetic polyneuropathy, without long-term current use of insulin (H)  Glycosylated Hemoglobin A1c    Comprehensive Metabolic Panel    Lipid Cascade RANDOM    metFORMIN (GLUCOPHAGE-XR) 500 MG 24 hr tablet   2. Flu vaccine need  Influenza,Quad,High Dose,PF 65 YR+   3. Mixed hyperlipidemia     4. Peripheral polyneuropathy  Vitamin B12    Folate, Serum   5. Low testosterone  Testosterone, Total   6. Chronic obstructive pulmonary disease, unspecified COPD type (H)     7. Hypertension         Diagnoses and all orders for this visit:    Type 2 diabetes mellitus with diabetic polyneuropathy, without long-term current use of insulin (H)  -     Glycosylated Hemoglobin A1c  -     Comprehensive Metabolic Panel  -     Lipid Cascade RANDOM  -     metFORMIN (GLUCOPHAGE-XR) 500 MG 24 hr tablet; TAKE 2 TABLETS BY MOUTH TWICE DAILY  Dispense: 360 tablet; Refill: 3  -Check above labs.  Metformin.  Continue aspirin and statin.  Recommend follow-up in 3 months    Flu vaccine need  -     Influenza,Quad,High Dose,PF 65 YR+    Mixed hyperlipidemia  Continue statin    Peripheral polyneuropathy  -     Vitamin B12  -     Folate, Serum  -Continue gabapentin.  Check B12 and folate acid.  Will replace if these levels are low    Low testosterone  -     Testosterone, Total    Chronic obstructive pulmonary disease, unspecified COPD type (H)  Stable.  Continue current inhaler regimen    Hypertension  Continue current dose of lisinopril             Subjective:      Raf Dalton is a 70 y.o. male who is seen today for follow-up on diabetes, hypertension, hyperlipidemia and other chronic medical conditions.  Reviewed and updated his medications and allergies.  He would like influenza vaccine today.  States that overall he has been doing well.  He is flustered about the upcoming change in his insurance.  Recently found out that his insurance plan will now be out of [employer].  He has many specialist that he follows with but he is not sure which specialist would also be out of network and which ones would not be.  He is not sure what he is going to do.  Thinking that he will probably change his insurance plan because he does not want to change all of his doctors.  He continues on all of his usual medications.  He reports no concerns for adverse side effects.  No new symptoms of concern.  Review of systems is assessed.  He does complain of fatigue.  Would like his testosterone level checked.  States he has been having a little bit more neuropathy in his feet.  Gabapentin does help.  His back is been feeling good.  No new concerns with chest pain or dyspnea with exertion.  Review of systems is otherwise negative.    Current Outpatient Medications   Medication Sig Dispense Refill     ACCU-CHEK TORI Misc        ACCU-CHEK SMARTVIEW TEST STRIP strips TEST BLOOD SUGAR TWICE DAILY 200 strip 3     acetaminophen (TYLENOL) 500 MG tablet Take 500 mg by mouth as needed.       albuterol (PROAIR HFA;PROVENTIL HFA;VENTOLIN HFA) 90 mcg/actuation inhaler Inhale 2 puffs every 6 (six) hours as needed for wheezing. 1 each 6     albuterol (PROVENTIL) 2.5 mg /3 mL (0.083 %) nebulizer solution Take 3 mL (2.5 mg total) by nebulization every 6 (six) hours as needed for wheezing or shortness of breath. 25 vial 2     aspirin 81 MG EC tablet Take 1 tablet (81 mg total) by mouth daily.  0     budesonide-formoterol (SYMBICORT) 80-4.5 mcg/actuation inhaler Inhale 2 puffs 2 (two) times a day. 1 Inhaler 12     buPROPion (WELLBUTRIN SR) 150 MG 12 hr tablet TAKE 1 TABLET TWICE DAILY 180 tablet 2     cholecalciferol, vitamin D3, (VITAMIN D3) 5,000 unit Tab Take 1 tablet (5,000 Units total) by mouth daily. 90 tablet 3     dulaglutide (TRULICITY) 0.75 mg/0.5 mL PnIj Inject 0.75 mg under the skin every 7 days. 6 mL 3     fluticasone propionate (FLONASE) 50 mcg/actuation nasal spray 1 spray into each nostril 2 (two)  times a day as needed. 16 g 11     gabapentin (NEURONTIN) 300 MG capsule Take 3 capsules in the morning, 2 in the afternoon and 3 capsules in the evening 720 capsule 3     generic lancets (ACCU-CHEK FASTCLIX) USE TO TEST TWICE DAILY 200 each 3     inhalational spacing device Spcr Use two times a day with symbicort 1 each 0     lisinopriL (PRINIVIL,ZESTRIL) 20 MG tablet TAKE 1 TABLET TWICE DAILY FOR BLOOD PRESSURE 180 tablet 1     metFORMIN (GLUCOPHAGE-XR) 500 MG 24 hr tablet TAKE 2 TABLETS BY MOUTH TWICE DAILY 360 tablet 3     sildenafil (REVATIO) 20 mg tablet Take by mouth as needed.         3     simvastatin (ZOCOR) 40 MG tablet TAKE 1 AND 1/2 TABLETS EVERY  tablet 3     terazosin (HYTRIN) 10 MG capsule TAKE 2 CAPSULES AT BEDTIME 180 capsule 1     tiotropium (SPIRIVA WITH HANDIHALER) 18 mcg inhalation capsule INHALE CONTENTS OF 1 CAPSULE DAILY 30 capsule 5     traZODone (DESYREL) 50 MG tablet Take 1 tablet (50 mg total) by mouth at bedtime as needed for sleep. 90 tablet 1     testosterone (ANDROGEL) 1.62 % (40.5 mg/2.5 gram) GlPk Apply 1 pck daily (Patient taking differently: as needed. Apply 1 pck daily      ) 75 g 5     No current facility-administered medications for this visit.        Past Medical History, Family History, and Social History reviewed.  Past Medical History:   Diagnosis Date     Aneurysm Of The Abdominal Aorta     Created by Conversion      Benign Prostatic Hypertrophy     Created by Conversion      Benign Prostatic Hypertrophy With Urinary Obstruction     Created by Conversion      Cataract     Created by Conversion      Cervicalgia     Created by Conversion      Chest pain 08/19/2019     Chronic Obstructive Pulmonary Disease     Created by Conversion      Diabetes mellitus, type II (H)      High cholesterol      Hyperglycemia     Created by Conversion      Hyperlipidemia     Created by Conversion      Hypertension     Created by Conversion      Hypoxia     Created by Conversion       Lower Back Sprain     Created by Conversion      Obstructive Sleep Apnea     Created by Conversion      Shortness of breath      Vitamin D Deficiency     Created by Conversion      Past Surgical History:   Procedure Laterality Date     ABDOMINAL AORTIC ANEURYSM REPAIR  04/09/2019     FINGER SURGERY       Patient has no known allergies.  Family History   Problem Relation Age of Onset     Snoring Father      Social History     Socioeconomic History     Marital status:      Spouse name: Not on file     Number of children: Not on file     Years of education: Not on file     Highest education level: Not on file   Occupational History     Not on file   Social Needs     Financial resource strain: Not on file     Food insecurity     Worry: Not on file     Inability: Not on file     Transportation needs     Medical: Not on file     Non-medical: Not on file   Tobacco Use     Smoking status: Former Smoker     Packs/day: 1.50     Years: 55.00     Pack years: 82.50     Types: Cigarettes     Quit date: 10/18/2017     Years since quitting: 3.0     Smokeless tobacco: Never Used   Substance and Sexual Activity     Alcohol use: Yes     Alcohol/week: 21.0 standard drinks     Types: 21 Cans of beer per week     Comment: 21 beers per week     Drug use: No     Sexual activity: Never     Partners: Female     Birth control/protection: None   Lifestyle     Physical activity     Days per week: Not on file     Minutes per session: Not on file     Stress: Not on file   Relationships     Social connections     Talks on phone: Not on file     Gets together: Not on file     Attends [employer] service: Not on file     Active member of club or organization: Not on file     Attends meetings of clubs or organizations: Not on file     Relationship status: Not on file     Intimate partner violence     Fear of current or ex partner: Not on file     Emotionally abused: Not on file     Physically abused: Not on file     Forced sexual activity: Not  on file   Other Topics Concern     Not on file   Social History Narrative     Not on file         Review of systems is as stated in HPI, and the remainder of the 10 system review is otherwise negative.    Objective:     Vitals:    10/29/20 0900 10/29/20 0905   BP:  136/78   Pulse: 77    SpO2: 95%    Weight: 220 lb (99.8 kg)     Body mass index is 32.49 kg/m .        General appearance: alert, appears stated age and cooperative  Head: Normocephalic, without obvious abnormality, atraumatic  Lungs: clear to auscultation bilaterally  Heart: regular rate and rhythm, S1, S2 normal, no murmur, click, rub or gallop  Extremities: extremities normal, atraumatic, no cyanosis or edema  Neurologic: Grossly normal      This note has been dictated using voice recognition software. Any grammatical or context distortions are unintentional and inherent to the the software.

## 2021-06-12 NOTE — TELEPHONE ENCOUNTER
Sometimes low B12 levels can be associated with loss of memory.  Therefore I recommend that he take the B12 supplement as prescribed to improve his B12 levels.  Would recommend that he have his B12 level rechecked in about 3 months.  I would also encourage him to schedule a visit to evaluate his memory concerns in more detail as we did not discuss this at his recent visit.

## 2021-06-12 NOTE — TELEPHONE ENCOUNTER
Reason contacted:  Medication question  Information relayed:  Below message relayed to patient.   Additional questions:  No  Further follow-up needed:  No  Okay to leave a detailed message:  No

## 2021-06-13 NOTE — PROGRESS NOTES
S)  Patient reports he's a type two diabetic since 2016.  He stated that he has good circulation in his feet and has decent feeling as well.  However, he reports having tingling in his feet (he described needle pins feeling).  Currently patient is retired and [place] with his wife.  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  When asked to rate his pain levels on a 0-10 scale, patient stated his pain levels to be a 5 and equal between his feet.    O)  Bilateral dry skin on the plantar surface of his feet which he uses a lotion daily.  Bilateral pes planus foot type.  Currently wears a size 11 4E shoe (toes are at the end of his shoes when looking at the shoe inserts).    A)  Obtained semi-weight bearing foam box impressions of feet without incident.  Using a [employer] devise the patient measured a size 11.5 and width of 4E.  Patient selected the Dr. Beverley Medrano shoe (khaki color [id number]).    P)  Scheduled patient for a fitting appointment in one week.  Will order shoes today from Dr. Lowery.    Goal:  Inserts are used to protect diabetic feet from shear forces.  Extra depth shoes are used for extra column height in the shoe to accommodate inserts.    Juan Razo CPO.

## 2021-06-13 NOTE — TELEPHONE ENCOUNTER
RN cannot approve Refill Request    RN can NOT refill this medication Protocol failed and NO refill given. Last office visit: 10/29/2020 Bekah Castanon MD Last Physical: 4/1/2019 Last MTM visit: Visit date not found Last visit same specialty: 10/29/2020 Bekah Castanon MD.  Next visit within 3 mo: Visit date not found  Next physical within 3 mo: Visit date not found      Nell Angel, [facility] Triage/Med Refill 11/20/2020    Requested Prescriptions   Pending Prescriptions Disp Refills     metFORMIN (GLUCOPHAGE-XR) 500 MG 24 hr tablet [Pharmacy Med Name: METFORMIN HYDROCHLORIDE  MG Tablet Extended Release 24 Hour] 360 tablet 3     Sig: TAKE 2 TABLETS TWICE DAILY       Metformin Refill Protocol Failed - 11/19/2020 12:49 AM        Failed - Microalbumin in last year      Microalbumin, Random Urine   Date Value Ref Range Status   11/04/2019 0.85 0.00 - 1.99 mg/dL Final                  Passed - Blood pressure in last 12 months     BP Readings from Last 1 Encounters:   10/29/20 136/78             Passed - LFT or AST or ALT in last 12 months     Albumin   Date Value Ref Range Status   10/29/2020 3.9 3.5 - 5.0 g/dL Final     Bilirubin, Total   Date Value Ref Range Status   10/29/2020 0.5 0.0 - 1.0 mg/dL Final     Bilirubin, Direct   Date Value Ref Range Status   04/01/2016 0.3 <=0.5 mg/dL Final     Alkaline Phosphatase   Date Value Ref Range Status   10/29/2020 68 45 - 120 U/L Final     AST   Date Value Ref Range Status   10/29/2020 15 0 - 40 U/L Final     ALT   Date Value Ref Range Status   10/29/2020 14 0 - 45 U/L Final     Protein, Total   Date Value Ref Range Status   10/29/2020 7.1 6.0 - 8.0 g/dL Final                Passed - GFR or Serum Creatinine in last 6 months     GFR MDRD Non Af Amer   Date Value Ref Range Status   10/29/2020 55 (L) >60 mL/min/1.73m2 Final     GFR MDRD Af Amer   Date Value Ref Range Status   10/29/2020 >60 >60 mL/min/1.73m2 Final             Passed - Visit with PCP or prescribing  provider visit in last 6 months or next 3 months     Last office visit with prescriber/PCP: 10/29/2020 OR same dept: 10/29/2020 Bekah Castanon MD OR same specialty: 10/29/2020 Bekah Castanon MD Last physical: Visit date not found Last MTM visit: Visit date not found         Next appt within 3 mo: Visit date not found  Next physical within 3 mo: Visit date not found  Prescriber OR PCP: Bekah Castanon MD  Last diagnosis associated with med order: 1. Type 2 diabetes mellitus with diabetic polyneuropathy, without long-term current use of insulin (H)  - metFORMIN (GLUCOPHAGE-XR) 500 MG 24 hr tablet [Pharmacy Med Name: METFORMIN HYDROCHLORIDE  MG Tablet Extended Release 24 Hour]; TAKE 2 TABLETS TWICE DAILY  Dispense: 360 tablet; Refill: 3     If protocol passes may refill for 12 months if within 3 months of last provider visit (or a total of 15 months).           Passed - A1C in last 6 months     Hemoglobin A1c   Date Value Ref Range Status   10/29/2020 5.4 <=5.6 % Final     Comment:     Normal <5.7% Prediabete 5.7-6.4% Diabletes 6.5% or higher - adopted from ADA consensus guidelines

## 2021-06-13 NOTE — TELEPHONE ENCOUNTER
Refill Approved    Rx renewed per Medication Renewal Policy. Medication was last renewed on 12/18/2019 with 3 refills.  Last office visit: 10/29/2020 with PCP Dr BRII Bolanos, [facility] Triage/Med Refill 11/26/2020     Requested Prescriptions   Pending Prescriptions Disp Refills     simvastatin (ZOCOR) 40 MG tablet [Pharmacy Med Name: SIMVASTATIN 40 MG Tablet] 135 tablet 3     Sig: TAKE 1 AND 1/2 TABLETS EVERY DAY       Statins Refill Protocol (Hmg CoA Reductase Inhibitors) Passed - 11/25/2020  8:52 PM        Passed - PCP or prescribing provider visit in past 12 months      Last office visit with prescriber/PCP: 10/29/2020 Bekah Castanon MD OR same dept: 10/29/2020 Bekah Castanon MD OR same specialty: 10/29/2020 Bekha Castanon MD  Last physical: 4/1/2019 Last MTM visit: Visit date not found   Next visit within 3 mo: Visit date not found  Next physical within 3 mo: Visit date not found  Prescriber OR PCP: Bekah Castanon MD  Last diagnosis associated with med order: 1. Hyperlipemia  - simvastatin (ZOCOR) 40 MG tablet [Pharmacy Med Name: SIMVASTATIN 40 MG Tablet]; TAKE 1 AND 1/2 TABLETS EVERY DAY  Dispense: 135 tablet; Refill: 3    If protocol passes may refill for 12 months if within 3 months of last provider visit (or a total of 15 months).

## 2021-06-13 NOTE — PROGRESS NOTES
Assessment/Plan:     1. Type 2 diabetes mellitus  Glycosylated Hemoglobin A1c    Hepatitis C Antibody (Anti-HCV)    Ambulatory referral to Diabetes Education (Existing Diagnosis)    Ambulatory referral for Orthotics - [place]   2. Hypertension  Comprehensive Metabolic Panel   3. Hyperlipemia  Lipid Cascade    Comprehensive Metabolic Panel   4. Need for immunization against influenza  Influenza High Dose, Seasonal 65+ yrs   5. Dyspnea on exertion  NM Exercise Stress Test   6. Accessory renal artery  Ambulatory referral to Nephrology   7. BPH (benign prostatic hyperplasia)     8. COPD (chronic obstructive pulmonary disease) with emphysema     9. AAA (abdominal aortic aneurysm)     10. Peripheral neuropathy     11. Sleep apnea         Diagnoses and all orders for this visit:    Type 2 diabetes mellitus  -     Glycosylated Hemoglobin A1c  -     Hepatitis C Antibody (Anti-HCV)  -     Ambulatory referral to Diabetes Education (Existing Diagnosis)  -     Ambulatory referral for Orthotics - [place]  -We will check A1c, lipids and comprehensive metabolic panel today.  Blood pressure is under good control.  Continue lisinopril.  Continue statin.  Continue aspirin 81 mg daily.  Referral placed to diabetes education.  Order placed for diabetic shoes.  This is a face-to-face encounter for diabetic shoes.  He does have peripheral neuropathy and is a type II diabetic.    Hypertension  -     Comprehensive Metabolic Panel  -Well-controlled with lisinopril 20 mg twice daily.    Hyperlipemia  -     Lipid Cascade  -     Comprehensive Metabolic Panel  -Continue simvastatin 40 mg daily    Need for immunization against influenza  -     Influenza High Dose, Seasonal 65+ yrs    Dyspnea on exertion  -     NM Exercise Stress Test; Future; Expected date: 10/16/17  -He has underlying COPD.  However he does have multiple risk factors for coronary artery disease.  He is planning to have upcoming surgery for his abdominal aortic aneurysm.   Recommend stress test prior to surgery.  Order is placed.    Accessory renal artery  -     Ambulatory referral to Nephrology  -There is an accessory right renal artery to the lower pole of the right kidney coming from the aneurysmal sac.  There is concern about how the kidney may be affected following surgery for his abdominal aortic aneurysm.    BPH (benign prostatic hyperplasia)  -Increase terazosin to 5 mg daily    COPD (chronic obstructive pulmonary disease) with emphysema  -Continue Spiriva, Symbicort daily.  Continue albuterol as needed    AAA (abdominal aortic aneurysm)  -Following with Dr. Stark.  Next ultrasound planned for January 2018.  Continue aspirin and statin.  Blood pressure under control.    Peripheral neuropathy  -Order placed for diabetic shoes    Sleep apnea  -Continue CPAP machine             Subjective:      Raf Dalton is a 67 y.o. male who comes in today for diabetic check and to establish care.  Previous primary care provider recently retired.  Reviewed his health history.  Significant for abdominal aortic aneurysm.  He is following with Dr. Stark of vascular surgery.  He will follow-up again in January 2018.  Reports that he is planning to have this aneurysm repaired likely within the next year.  He also has sleep apnea and uses a CPAP machine.  Next follow-up scheduled for July 2018.  He has COPD and follows with Dr. Caballero of pulmonology.  He is on Spiriva daily, Symbicort twice daily and uses albuterol inhaler as needed.  He also has history of benign prostatic hypertrophy.  He is currently taking terazosin 4 mg daily.  Continues to be bothered by symptoms of urinary frequency and wonders about increasing the dose or another medication.  He also has low testosterone and uses topical testosterone gel.  He has type 2 diabetes.  Currently on metformin  mg twice daily and glipizide 5 mg twice daily.  He has concerns about his kidneys.  Was told by Dr. Stark that there is an  accessory artery from his abdominal aortic aneurysm to the lower pole of his right kidney.  There is concern about how the kidney would be affected once he has surgery to repair his abdominal aortic aneurysm.  He also has peripheral neuropathy.  Wonders about getting diabetic shoes.  He still has some dyspnea despite use of his inhalers.  He has underlying hypertension and hyperlipidemia.  Last stress test was in 2012.  We reviewed medications and allergies.  Chart is updated.  Review of systems is assessed.  He denies chest pains and pressures in the chest.  No dizziness or lightheadedness.  No lower extremity edema.  Review of systems is otherwise negative.    Current Outpatient Prescriptions   Medication Sig Dispense Refill     ACCU-CHEK TORI Misc        albuterol (PROAIR HFA;PROVENTIL HFA;VENTOLIN HFA) 90 mcg/actuation inhaler Inhale 2 puffs every 6 (six) hours as needed for wheezing. 1 each 4     aspirin 81 MG EC tablet Take 1 tablet (81 mg total) by mouth daily.  0     blood glucose test (ACCU-CHEK SMARTVIEW TEST STRIP) strips Use 1 each As Directed 2 (two) times a day. Dispense brand per patient's insurance at pharmacy discretion. 200 strip 0     budesonide-formoterol (SYMBICORT) 80-4.5 mcg/actuation inhaler Inhale 2 puffs 2 (two) times a day. 1 Inhaler 12     buPROPion (WELLBUTRIN SR) 150 MG 12 hr tablet TAKE 1 TABLET BY MOUTH TWICE DAILY 180 tablet 2     cholecalciferol, vitamin D3, (VITAMIN D3) 5,000 unit Tab Take 1 tablet (5,000 Units total) by mouth daily. 90 tablet 0     glipiZIDE (GLUCOTROL) 5 MG tablet Take 1 tablet (5 mg total) by mouth 2 (two) times a day before meals. 1/2 Hour BEFORE meals 60 tablet 11     lisinopril (PRINIVIL,ZESTRIL) 20 MG tablet TAKE 1 TABLET BY MOUTH TWICE DAILY FOR BLOOD PRESSURE 180 tablet 0     metFORMIN (GLUCOPHAGE XR) 500 MG 24 hr tablet Take 1 tablet (500 mg total) by mouth 2 (two) times a day. 60 tablet 11     simvastatin (ZOCOR) 40 MG tablet TAKE 1 AND 1/2 TABLETS BY  MOUTH EVERY  tablet 0     testosterone (ANDROGEL) 1.62 % (40.5 mg/2.5 gram) GlPk Apply 1 pck daily 75 g 5     tiotropium (SPIRIVA WITH HANDIHALER) 18 mcg inhalation capsule INHALE CONTENTS OF 1 CAPSULE DAILY 30 capsule 5     generic lancets (ACCU-CHEK FASTCLIX) USE TO TEST TWICE DAILY 200 each 0     terazosin (HYTRIN) 5 MG capsule Take 1 capsule (5 mg total) by mouth at bedtime. 90 capsule 3     No current facility-administered medications for this visit.        Past Medical History, Family History, and Social History reviewed.  Past Medical History:   Diagnosis Date     Aneurysm Of The Abdominal Aorta     Created by Conversion      Benign Prostatic Hypertrophy     Created by Conversion      Benign Prostatic Hypertrophy With Urinary Obstruction     Created by Conversion      Cataract     Created by Conversion      Cervicalgia     Created by Conversion      Chronic Obstructive Pulmonary Disease     Created by Conversion      Hyperglycemia     Created by Conversion      Hyperlipidemia     Created by Conversion      Hypertension     Created by Conversion      Hypoxia     Created by Conversion      Lower Back Sprain     Created by Conversion      Obstructive Sleep Apnea     Created by Conversion      Vitamin D Deficiency     Created by Conversion      Past Surgical History:   Procedure Laterality Date     FINGER SURGERY       Review of patient's allergies indicates no known allergies.  Family History   Problem Relation Age of Onset     Snoring Father      Social History     Social History     Marital status:      Spouse name: N/A     Number of children: N/A     Years of education: N/A     Occupational History     Not on file.     Social History Main Topics     Smoking status: Current Some Day Smoker     Packs/day: 0.25     Years: 54.00     Types: Cigarettes     Last attempt to quit: 7/26/2016     Smokeless tobacco: Never Used     Alcohol use 12.6 oz/week     21 Cans of beer per week      Comment: 21 beers  per week     Drug use: No     Sexual activity: No     Other Topics Concern     Not on file     Social History Narrative         Review of systems is as stated in HPI, and the remainder of the 10 system review is otherwise negative.    Objective:     Vitals:    10/12/17 1100   BP: 120/76   Patient Site: Left Arm   Patient Position: Sitting   Cuff Size: Adult Large   Pulse: 80   Temp: 98.7  F (37.1  C)   TempSrc: Tympanic   SpO2: 96%   Weight: (!) 253 lb 3 oz (114.8 kg)    Body mass index is 36.33 kg/(m^2).        General appearance: alert, appears stated age and cooperative  Head: Normocephalic, without obvious abnormality, atraumatic  Neck: no carotid bruit  Lungs: clear to auscultation bilaterally  Heart: regular rate and rhythm, S1, S2 normal, no murmur, click, rub or gallop  Abdomen: soft, non-tender; bowel sounds normal; no masses,  no organomegaly  Extremities: extremities normal, atraumatic, no cyanosis or edema, decreased sensation present planter surface of toes and ball of foot on monofilament exam  Pulses: 2+ and symmetric      This note has been dictated using voice recognition software. Any grammatical or context distortions are unintentional and inherent to the the software.

## 2021-06-14 NOTE — PROGRESS NOTES
S)  Patient arrived in good spirits and was excited that his shoes came in earlier then the back order date.    O)  Patient came in with old lace up shoes, which were missing the inserts.    A)  Fit and delivered custom inserts and extra depth shoes.  Trimmed inserts to the length and width of the shoes selected.  Patient ambulated around the office and stated the inserts felt a little weird at first and the shoes are a little bigger then he expected (in terms of width); skin check was good after ambulation was good.  Advised patient to a wear schedule of four hours the first day, increasing wear time by one hour each day after the first.  Furthermore, advised the patient to preform skin checks himself (after 30-60 mins of wear time and at the end of the day).  Patient understood he's looking for dark red spots, that if present and not dissipating after 10 minutes to discontinue use and call me for an adjustment.  Provided patient with manufacture wear and care instructions.    P)  Scheduled patient for a follow up appointment in two weeks.  Patient would like to try one size smaller in width (He stated a wide would not work for him though).    Juan Razo CPO.

## 2021-06-15 NOTE — PROGRESS NOTES
6 mo f/u - abdominal aortic aneurysm. Previous US showed 5.5cm AAA. He has been doing well, with no significant abdominal or back pain. HTN, DM2. Meds, ASA, statin.

## 2021-06-15 NOTE — PROGRESS NOTES
Assessment: Raf is here alone today for a refresher.    He is taking Metformin 1000mg BID and Glipizide 5mg BID.  He takes this in the morning before breakfast and before dinner most of the time.  Stated he does taking his pills after dinner sometimes, but not often.    He checks his blood sugars 1 time daily, fasting.  He did not bring his meter of logbook with him today.  Stated his FBG usually are around 150's-160's.  Stated he cheated and had spaghetti last night and FBG this morning was over 200.      Plan:     Subjective and Objective:      Raf Dalton is referred by  for Diabetes Education.     Lab Results   Component Value Date    HGBA1C 7.5 (H) 10/12/2017         Current diabetes medications:      Goals       HEMOGLOBIN A1C < 8.0            Goals for 01.09.18:  Nutrition-  Work on portion control, even with snacks  Other-  Start walking around atrium in building 3 times per day                  Doris Knowles  1/9/2018

## 2021-06-15 NOTE — PROGRESS NOTES
HPI: Mr. Dalton presents for follow-up of his abdominal aortic aneurysm.  He has been doing well, with no significant abdominal or back pain.    Allergies, Medications, Social History, Past Medical History and Past Surgical History were reviewed and are noted in the chart.      /80  Pulse 74  Temp 97.7  F (36.5  C)  Resp 17  There is no height or weight on file to calculate BMI.    EXAM:  GENERAL: This is an obese male in no distress.      IMAGES:   Us Aorta Ivc Iliac Non Graft Complete    Result Date: 2/5/2018  US AORTA IVC ILIAC NON GRAFT COMPLETE 2/5/2018 8:46 AM INDICATION: Evaluate known infrarenal abdominal aortic aneurysm. TECHNIQUE: Ultrasound using gray-scale, two-dimensional images. COMPARISON: CT angiogram of the abdomen and pelvis dated 7/10/2017. FINDINGS: There is moderate atheromatous plaque in the abdominal aorta. MEASUREMENTS: Abdominal Aorta: Proximal: 4.6 x 5.2 cm Mid: 4.7 x 4.6 cm Distal: 5.3 x 5.2 cm Right Common Iliac Artery: 2.4 x 2.8 cm Left Common Iliac Artery: 2.2 x 2.7 cm     CONCLUSION: 1.  Infrarenal abdominal aortic aneurysm measuring 5.3 cm in maximal dimension. The aneurysm measured 5.1 cm on recent CT angiogram dated 7/10/2017. 2.  Aneurysmal bilateral common iliac arteries.      I reviewed his ultrasound with the patient. The raw numbers show the aneurysm measures 5.3 x 5.2 cm, compared to 5.5 cm on ultrasound on the previous occasion.    Assessment/Plan: Mr. Dalton appears to be doing well.   The aneurysm is still too small to require intervention.   He will return in 6 months for a repeat ultrasound.      Sanya Stark MD

## 2021-06-15 NOTE — TELEPHONE ENCOUNTER
RN cannot approve Refill Request    RN can NOT refill this medication PCP messaged that patient is overdue for Labs. Last office visit: 10/29/2020 Bekah Castanon MD Last Physical: 4/1/2019 Last MTM visit: Visit date not found Last visit same specialty: 10/29/2020 Bekah Castanon MD.  Next visit within 3 mo: Visit date not found  Next physical within 3 mo: Visit date not found      Cynthia Mathews, [facility] Triage/Med Refill 3/11/2021    Requested Prescriptions   Pending Prescriptions Disp Refills     TRULICITY 0.75 mg/0.5 mL PnIj [Pharmacy Med Name: TRULICITY 0.75 MG/0.5ML Solution Pen-injector]  0     Sig: INJECT  0.75MG  UNDER  THE  SKIN EVERY 7 DAYS       Insulin/GLP-1 Refill Protocol Failed - 3/11/2021  3:29 PM        Failed - Microalbumin in last year     Microalbumin, Random Urine   Date Value Ref Range Status   11/04/2019 0.85 0.00 - 1.99 mg/dL Final                  Passed - Visit with PCP or prescribing provider visit in last 6 months     Last office visit with prescriber/PCP: 10/29/2020 OR same dept: 10/29/2020 Bekah Castanon MD OR same specialty: 10/29/2020 Bekah Castanon MD Last physical: Visit date not found Last MTM visit: Visit date not found     Next appt within 3 mo: Visit date not found  Next physical within 3 mo: Visit date not found  Prescriber OR PCP: Bekah Castanon MD  Last diagnosis associated with med order: 1. Type 2 diabetes mellitus without complication, without long-term current use of insulin (H)  - TRULICITY 0.75 mg/0.5 mL PnIj [Pharmacy Med Name: TRULICITY 0.75 MG/0.5ML Solution Pen-injector]; INJECT  0.75MG  UNDER  THE  SKIN EVERY 7 DAYS; Refill: 0    If protocol passes may refill for 6 months if within 3 months of last provider visit (or a total of 9 months).              Passed - A1C in last 6 months     Hemoglobin A1c   Date Value Ref Range Status   10/29/2020 5.4 <=5.6 % Final     Comment:     Normal <5.7% Prediabete 5.7-6.4% Diabletes 6.5% or higher - adopted from ADA  consensus guidelines               Passed - Blood pressure in last year     BP Readings from Last 1 Encounters:   10/29/20 136/78             Passed - Creatinine done in last year     Creatinine   Date Value Ref Range Status   02/01/2021 1.15 0.70 - 1.30 mg/dL Final

## 2021-06-15 NOTE — TELEPHONE ENCOUNTER
Refill Approved    Rx renewed per Medication Renewal Policy. Medication was last renewed on 9/19/20, last OV 10/29/20.    Denise Medrano, [facility] Triage/Med Refill 3/14/2021     Requested Prescriptions   Pending Prescriptions Disp Refills     traZODone (DESYREL) 50 MG tablet [Pharmacy Med Name: TRAZODONE HYDROCHLORIDE 50 MG Tablet] 90 tablet 1     Sig: TAKE 1 TABLET AT BEDTIME AS NEEDED FOR SLEEP       Tricyclics/Misc Antidepressant/Antianxiety Meds Refill Protocol Passed - 3/13/2021  3:41 PM        Passed - PCP or prescribing provider visit in last year     Last office visit with prescriber/PCP: Visit date not found OR same dept: 10/29/2020 Bekah Castanon MD OR same specialty: 10/29/2020 Bekah Castanon MD  Last physical: Visit date not found Last MTM visit: Visit date not found   Next visit within 3 mo: Visit date not found  Next physical within 3 mo: Visit date not found  Prescriber OR PCP: Fifi Driscoll MD  Last diagnosis associated with med order: 1. Chronic obstructive pulmonary disease, unspecified COPD type (H)  - traZODone (DESYREL) 50 MG tablet [Pharmacy Med Name: TRAZODONE HYDROCHLORIDE 50 MG Tablet]; TAKE 1 TABLET AT BEDTIME AS NEEDED FOR SLEEP  Dispense: 90 tablet; Refill: 1    If protocol passes may refill for 12 months if within 3 months of last provider visit (or a total of 15 months).

## 2021-06-16 PROBLEM — M54.9 BACK PAIN: Status: ACTIVE | Noted: 2018-03-29

## 2021-06-16 PROBLEM — E11.9 TYPE 2 DIABETES MELLITUS (H): Status: ACTIVE | Noted: 2017-10-12

## 2021-06-16 PROBLEM — K63.5 POLYP OF COLON: Status: ACTIVE | Noted: 2018-09-27

## 2021-06-16 PROBLEM — D12.2 BENIGN NEOPLASM OF ASCENDING COLON: Status: ACTIVE | Noted: 2018-10-01

## 2021-06-16 PROBLEM — I71.40 AAA (ABDOMINAL AORTIC ANEURYSM) WITHOUT RUPTURE (H): Status: ACTIVE | Noted: 2019-03-21

## 2021-06-16 PROBLEM — Z86.0100 HISTORY OF COLONIC POLYPS: Status: ACTIVE | Noted: 2018-10-01

## 2021-06-16 PROBLEM — K64.9 HEMORRHOIDS: Status: ACTIVE | Noted: 2018-09-27

## 2021-06-16 PROBLEM — R07.9 CHEST PAIN: Status: ACTIVE | Noted: 2019-08-19

## 2021-06-16 PROBLEM — I71.40 AAA (ABDOMINAL AORTIC ANEURYSM) (H): Status: ACTIVE | Noted: 2019-04-09
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
## 2021-06-16 NOTE — TELEPHONE ENCOUNTER
Telephone Encounter by Radha Dickey at 2/26/2019  4:47 PM     Author: Rdaha Dickey Service: -- Author Type: --    Filed: 2/26/2019  4:49 PM Encounter Date: 2/22/2019 Status: Signed    : Radha Dickey APPROVED:    Approval start date: 2/26/2019  Approval end date:2/26/2020    Pharmacy has been notified of approval and will contact patient when medication is ready for pickup.

## 2021-06-16 NOTE — TELEPHONE ENCOUNTER
Refill Approved    Rx renewed per Medication Renewal Policy. Medication was last renewed on 09/07/2020.  Last office visit was 10/29/2020 with PCP.    Carrie Horner, [facility] Triage/Med Refill 3/17/2021     Requested Prescriptions   Pending Prescriptions Disp Refills     terazosin (HYTRIN) 10 MG capsule [Pharmacy Med Name: TERAZOSIN HCL 10 MG Capsule] 180 capsule 1     Sig: TAKE 2 CAPSULES AT BEDTIME       Alpha Blockers Refill Protocol  Passed - 3/15/2021  2:22 PM        Passed - PCP or prescribing provider visit in past 12 months       Last office visit with prescriber/PCP: Visit date not found OR same dept: 10/29/2020 Bekah Castanon MD OR same specialty: 10/29/2020 Bekah Castanon MD  Last physical: Visit date not found Last MTM visit: Visit date not found   Next visit within 3 mo: Visit date not found  Next physical within 3 mo: Visit date not found  Prescriber OR PCP: Fifi Driscoll MD  Last diagnosis associated with med order: 1. Benign prostatic hyperplasia with urinary frequency  - terazosin (HYTRIN) 10 MG capsule [Pharmacy Med Name: TERAZOSIN HCL 10 MG Capsule]; TAKE 2 CAPSULES AT BEDTIME  Dispense: 180 capsule; Refill: 1    If protocol passes may refill for 12 months if within 3 months of last provider visit (or a total of 15 months).             Passed - Blood pressure filed in past 12 months     BP Readings from Last 1 Encounters:   10/29/20 136/78

## 2021-06-16 NOTE — TELEPHONE ENCOUNTER
Telephone Encounter by Radha Dickey at 2/26/2019 10:02 AM     Author: Radha Dickey Service: -- Author Type: --    Filed: 2/26/2019 10:03 AM Encounter Date: 2/22/2019 Status: Signed    : Radha Dickey       [facility] team  [phone]    PA has been initiated.

## 2021-06-17 NOTE — PATIENT INSTRUCTIONS - HE
Patient Instructions by Kobe Weaver RN at 2/13/2019 10:20 AM     Author: Kobe Weaver RN Service: -- Author Type: Registered Nurse    Filed: 2/13/2019 11:02 AM Encounter Date: 2/13/2019 Status: Addendum    : Kobe Weaver RN (Registered Nurse)    Related Notes: Original Note by Kobe Weaver RN (Registered Nurse) filed at 2/13/2019 11:01 AM       Please drink plenty of fluids and rest since your blood pressure is low. If symptoms worsen, go to the ER. Please go see Dr. Castanon TODAY AT 2:40PM.     Patient Education     Surgery for Abdominal Aortic Aneurysm  During surgery for abdominal aortic aneurysm (AAA), the weakened aortic wall is replaced with a hollow manmade tube (a graft).  Reaching the aneurysm  The aorta can be reached through open surgery. Or a less invasive endovascular procedure may be done. Your surgeon will choose the best approach for you.  Open surgery  An incision is made in your abdomen. Once inside, your surgeon gently moves aside your organs to reach the damaged section of the aorta.  Endovascular procedure  Near your groin, 2 small incisions are made. Then a catheter (a thin, flexible tube) is threaded into the artery at the incision. A graft is placed inside the catheter and guided toward the damaged part of the aorta.  Placing the graft    The goal is to safely route blood past the aneurysm.  During open surgery  Here is what to expect:    The aneurysm is opened and cleaned of any blood clot.    The graft is sewn to the aorta.    The wall of the aorta is wrapped around the graft to protect it. The wall is then sewn up.    The incision site is closed with sutures or staples.  During an endovascular procedure  Here is what to expect:    Watching the catheter on a video monitor, the surgeon places a catheter in the best position. This position is confirmed by an angiogram (dye study).     The graft is guided through the catheter and expanded so blood can flow through it.    The blood is now  re-routed through the graft and does not fill the aneurysm anymore.    The graft is attached inside the artery. It is held in place with stents (metal springs), hooks, or pins.    The catheter is removed. The incision sites are closed with sutures or staples.  Risks and complications  Here are potential problems to be aware of:     Infection    Blood clots in legs    Bleeding    Kidney failure    Respiratory failure    Injury to the colons blood supply    Erectile dysfunction    Spinal cord injury    Heart attack, stroke, or death   Date Last Reviewed: 5/1/2016 2000-2017 [employer]. [address] [place] [id number]. All rights reserved. This information is not intended as a substitute for professional medical care. Always follow your healthcare professional's instructions.

## 2021-06-17 NOTE — PROGRESS NOTES
VASCULAR SURGERY OUTPATIENT CONSULT OR VISIT   VASCULAR SURGEON: Marquita Brian MD    LOCATION:  [facility]    Raf Dalton   Medical Record #:  [id number]  YOB: 1950  Age:  71 y.o.     Date of Service: 5/20/2021    PRIMARY CARE PROVIDER: Bekah Castanon MD      Reason for consultation: Surveillance after endovascular aneurysm repair    IMPRESSION: Patient underwent endovascular and resume repair by me in April 2019.  Clinically doing very well.  CT scan demonstrates continued sac size reduction to now 4.3 cm from 4.6 comparing from his scan a year ago.  No evidence of endoleak on duplex imaging.  Does continue to smoke.  Is on a statin and aspirin therapy.    RECOMMENDATION: Overall doing very well and with good prognosis from standpoint of aneurysm.  Continue to follow for development of any issues with a repeat noncontrast CT abdomen and pelvis and a duplex of his aorta in 1 years time.    HPI:  Raf Dalton is a 71 y.o. male who was seen today in surveillance of his abdominal aortic aneurysm repair.  Clinically doing very well with no complaints.  Continues to smoke unfortunately and recognizes this is an issue.  Unfortunately his wife also smokes and he feels that it is very challenging to have a quitting plan given that there is smoking in the family.  On a positive aspect he has received his Covid vaccine.  He is trying to convince his wife to get hers.  I strongly supported this given that the state is opening up and that the risk of her getting significant Covid will be quite high and potentially serious.    Patient has been on statin and aspirin for a long time and is doing well in this regard.    Other active health issues are sciatica in the right leg for which he is receiving evaluation and therapy at a nearby clinic.        PHH:    Past Medical History:   Diagnosis Date     Aneurysm Of The Abdominal Aorta     Created by Conversion      Benign Prostatic Hypertrophy      Created by Conversion      Benign Prostatic Hypertrophy With Urinary Obstruction     Created by Conversion      Cataract     Created by Conversion      Cervicalgia     Created by Conversion      Chest pain 08/19/2019     Chronic Obstructive Pulmonary Disease     Created by Conversion      Diabetes mellitus, type II (H)      High cholesterol      Hyperglycemia     Created by Conversion      Hyperlipidemia     Created by Conversion      Hypertension     Created by Conversion      Hypoxia     Created by Conversion      Lower Back Sprain     Created by Conversion      Obstructive Sleep Apnea     Created by Conversion      Shortness of breath      Vitamin D Deficiency     Created by Conversion         Past Surgical History:   Procedure Laterality Date     ABDOMINAL AORTIC ANEURYSM REPAIR  04/09/2019     FINGER SURGERY         ALLERGIES:  Patient has no known allergies.    MEDS:    Current Outpatient Medications:      ACCU-CHEK TORI Misc, , Disp: , Rfl:      ACCU-CHEK SMARTVIEW TEST STRIP strips, TEST BLOOD SUGAR TWICE DAILY, Disp: 200 strip, Rfl: 3     acetaminophen (TYLENOL) 500 MG tablet, Take 500 mg by mouth as needed., Disp: , Rfl:      albuterol (PROAIR HFA;PROVENTIL HFA;VENTOLIN HFA) 90 mcg/actuation inhaler, Inhale 2 puffs every 6 (six) hours as needed for wheezing., Disp: 1 each, Rfl: 6     albuterol (PROVENTIL) 2.5 mg /3 mL (0.083 %) nebulizer solution, Take 3 mL (2.5 mg total) by nebulization every 6 (six) hours as needed for wheezing or shortness of breath., Disp: 25 vial, Rfl: 2     aspirin 81 MG EC tablet, Take 1 tablet (81 mg total) by mouth daily., Disp: , Rfl: 0     budesonide-formoterol (SYMBICORT) 80-4.5 mcg/actuation inhaler, Inhale 2 puffs 2 (two) times a day., Disp: 1 Inhaler, Rfl: 12     buPROPion (WELLBUTRIN SR) 150 MG 12 hr tablet, TAKE 1 TABLET TWICE DAILY, Disp: 180 tablet, Rfl: 2     cholecalciferol, vitamin D3, (VITAMIN D3) 5,000 unit Tab, Take 1 tablet (5,000 Units total) by mouth daily.,  Disp: 90 tablet, Rfl: 3     cyanocobalamin 1000 MCG tablet, Take 1 tablet (1,000 mcg total) by mouth daily., Disp: 100 tablet, Rfl: 3     fluticasone propionate (FLONASE) 50 mcg/actuation nasal spray, 1 spray into each nostril 2 (two) times a day as needed., Disp: 16 g, Rfl: 11     gabapentin (NEURONTIN) 300 MG capsule, Take 3 capsules in the morning, 2 in the afternoon and 3 capsules in the evening, Disp: 720 capsule, Rfl: 3     generic lancets (ACCU-CHEK FASTCLIX), USE TO TEST TWICE DAILY, Disp: 200 each, Rfl: 3     inhalational spacing device Spcr, Use two times a day with symbicort, Disp: 1 each, Rfl: 0     ketoconazole (NIZORAL) 2 % cream, ketoconazole 2 % topical cream  APPLY TO FUNGUS NAILS DAILY FOR 90 DAYS, Disp: , Rfl:      lisinopriL (PRINIVIL,ZESTRIL) 20 MG tablet, TAKE 1 TABLET TWICE DAILY FOR BLOOD PRESSURE, Disp: 180 tablet, Rfl: 1     metFORMIN (GLUCOPHAGE-XR) 500 MG 24 hr tablet, TAKE 2 TABLETS TWICE DAILY, Disp: 360 tablet, Rfl: 3     oxybutynin (DITROPAN) 5 MG tablet, , Disp: , Rfl:      sildenafil (REVATIO) 20 mg tablet, Take by mouth as needed.    , Disp: , Rfl: 3     simvastatin (ZOCOR) 40 MG tablet, TAKE 1 AND 1/2 TABLETS EVERY DAY, Disp: 135 tablet, Rfl: 3     terazosin (HYTRIN) 10 MG capsule, TAKE 2 CAPSULES AT BEDTIME, Disp: 180 capsule, Rfl: 2     tiotropium (SPIRIVA WITH HANDIHALER) 18 mcg inhalation capsule, INHALE CONTENTS OF 1 CAPSULE DAILY, Disp: 30 capsule, Rfl: 5     traZODone (DESYREL) 50 MG tablet, Take 1 tablet (50 mg total) by mouth at bedtime as needed for sleep., Disp: 90 tablet, Rfl: 1     TRULICITY 0.75 mg/0.5 mL PnIj, INJECT  0.75MG  UNDER  THE  SKIN EVERY 7 DAYS, Disp: 12 Syringe, Rfl: 3     testosterone (ANDROGEL) 1.62 % (40.5 mg/2.5 gram) GlPk, Apply 1 pck daily (Patient taking differently: as needed. Apply 1 pck daily   ), Disp: 75 g, Rfl: 5    SOCIAL HABITS:    Social History     Tobacco Use   Smoking Status Current Some Day Smoker     Packs/day: 0.50     Years: 55.00  "    Pack years: 27.50     Types: Cigarettes     Last attempt to quit: 10/18/2017     Years since quitting: 3.5   Smokeless Tobacco Never Used   Tobacco Comment    .5PPD       Social History     Substance and Sexual Activity   Alcohol Use Yes     Alcohol/week: 21.0 standard drinks     Types: 21 Cans of beer per week    Comment: 21 beers per week       Social History     Substance and Sexual Activity   Drug Use No       FAMILY HISTORY:    Family History   Problem Relation Age of Onset     Snoring Father        REVIEW OF SYSTEMS:    A 12 point ROS was reviewed and except for what is listed in the HPI above, all others are negative    PE:  /78   Pulse 60   Temp 98.3  F (36.8  C)   Resp 16   Ht 5' 8.5\" (1.74 m)   Wt 219 lb (99.3 kg)   BMI 32.81 kg/m    Wt Readings from Last 1 Encounters:   05/20/21 219 lb (99.3 kg)     Body mass index is 32.81 kg/m .    EXAM:  GENERAL: This is a well-developed 71 y.o. male who appears his stated age  EYES: Grossly normal.  MOUTH: Buccal mucosa normal   MUSCULOSKELETAL: Grossly normal and both lower extremities are intact.  HEME/LYMPH: No lymphedema  NEUROLOGIC: Focally intact, Alert and oriented x 3.   PSYCH: appropriate affect  INTEGUMENT: No open lesions or ulcers        DIAGNOSTIC STUDIES:     Images:  Ct Abdomen Pelvis Without Oral Without Iv Contrast    Result Date: 5/20/2021  EXAM: CT ABDOMEN PELVIS WO ORAL WO IV CONTRAST LOCATION: [facility] DATE/TIME: 5/20/2021 9:03 AM INDICATION: AAA post repair COMPARISON: 08/19/2019 TECHNIQUE: CT scan of the abdomen and pelvis was performed without oral or IV contrast. Multiplanar reformats were obtained. Dose reduction techniques were used. CONTRAST: None. FINDINGS: LOWER CHEST: Stable mild scarring in the lung bases. Mild bibasilar bronchiectasis. Coronary artery calcification. HEPATOBILIARY: Hepatic steatosis. PANCREAS: Normal. SPLEEN: Normal. ADRENAL GLANDS: Stable enlargement of the adrenal glands " without a discrete nodule. KIDNEY/BLADDER: Normal. BOWEL: Diverticulosis of the colon. No acute inflammatory change. No obstruction. LYMPH NODES: A left para-aortic retroperitoneal node measures 1.1 cm in short axis, previously 0.8 cm, image 88:4. A few additional smaller retroperitoneal lymph nodes are also increased in size. A periportal lymph node measures 1.9 cm in short axis, previously 1.6 cm, image 66:4. VASCULATURE: Stable position of an aortobiiliac stent graft. The excluded abdominal aortic artery aneurysm measures 4.3 x 3.7 cm, previously 4.3 x 4.3 cm, image 96:4. PELVIC ORGANS: Normal. MUSCULOSKELETAL: Stable small fat-containing umbilical hernia. Degenerative changes of the spine and hips.     1.  Decrease size of the excluded abdominal aortic artery aneurysm. 2.  Aortobiiliac stent graft without evidence of complication on this noncontrast examination. 3.  Mildly increased periportal and retroperitoneal lymphadenopathy. Recommend a follow-up computed tomography examination of the abdomen and pelvis in 3-6 months.    Us Aorta Iliac Graft Complete    Result Date: 5/20/2021  Aorta Endovascular Stent Graft Ultrasound (05/20/21) Indication: Surveillance Endovascular Stent Graft Endovascular Repair Date: 04/19/2019 Previous: 05/20/2021-CTA History: Current Smoker, Hypertension, Hyperlipidemia, Vascular Surgery and Surgical Follow-up Technique: Duplex imaging is performed utilizing gray-scale, two dimensional images, and color-flow imaging. Doppler waveform analysis and spectral Doppler imagine is also performed. Waveforms: Triphasic= T, Biphasic= B, Monophasic=M  Diameter (cm) AP x width Velocities cm/sec Waveform Pattern Proximal Aorta: 3.10   X   3.00 63 T Mid Aorta: 2.63   X   2.30 32 T Distal Aorta: 4.21   X   4.20 37 T Rt FRANCISCO Prx: 3.00   X   3.20 50 T Rt FRANCISCO Mid: 2.88   X   3.16 90 T Rt FRANCISCO Dst: 2.12   X   2.18 45 T Rt Outflow: 1.41   X   1.48 103 T Lt FRANCISCO Prx:  2.20   X   2.30 29 T Lt FRANCISCO Mid: 2.23   X    2.06 28 T Lt FRANCISCO Dst: 1.85   X   1.78 36 T Lt Outflow: 1.46   X   1.34  78 T EndoLeak: No  Location of Aneurysm:  Infrarenal Aneurysm greatest diameter dimensions (AP x Width):   4.21cm   X  4.20cm  Impressions: Abdominal aortic aneurysm now decreased to 4.2 cm in maximum diameter.  Good positioning of endograft with widely patent triphasic flow and no evidence of endoleak into the aneurysm sac. Reference: Category Normal Intermediate Severe Occluded  PSV  cm/s 151-300 cm/s <45 or >300cm/s Absent Flow Ratio <1.5 1.5-3.4 >3.4 N/A       I personally reviewed the images and my interpretation is that his noncontrast CT shows further reduction in aneurysm sac size with good positioning of the endograft.  Duplex imaging also shows no evidence of endoleak and good flow through the graft..    LABS:      Sodium   Date Value Ref Range Status   02/01/2021 141 136 - 145 mmol/L Final   10/29/2020 141 136 - 145 mmol/L Final   03/16/2020 139 136 - 145 mmol/L Final     Potassium   Date Value Ref Range Status   02/01/2021 4.2 3.5 - 5.0 mmol/L Final   10/29/2020 4.8 3.5 - 5.0 mmol/L Final   03/16/2020 4.3 3.5 - 5.0 mmol/L Final     Chloride   Date Value Ref Range Status   02/01/2021 104 98 - 107 mmol/L Final   10/29/2020 104 98 - 107 mmol/L Final   03/16/2020 102 98 - 107 mmol/L Final     BUN   Date Value Ref Range Status   02/01/2021 14 8 - 28 mg/dL Final   10/29/2020 15 8 - 28 mg/dL Final   03/16/2020 12 8 - 28 mg/dL Final     Creatinine   Date Value Ref Range Status   02/01/2021 1.15 0.70 - 1.30 mg/dL Final   10/29/2020 1.28 0.70 - 1.30 mg/dL Final   03/16/2020 0.80 0.70 - 1.30 mg/dL Final     Hemoglobin   Date Value Ref Range Status   03/16/2020 14.8 14.0 - 18.0 g/dL Final   08/19/2019 15.1 14.0 - 18.0 g/dL Final   04/17/2019 13.4 (L) 14.0 - 18.0 g/dL Final     Platelets   Date Value Ref Range Status   03/16/2020 200 140 - 440 thou/uL Final   08/19/2019 161 140 - 440 thou/uL Final   04/17/2019 273 140 - 440 thou/uL Final          Marquita Brian MD  VASCULAR SURGERY

## 2021-06-17 NOTE — PROGRESS NOTES
"CT AND US BEFORE-OV; 1 yr f/u; endovascular aneurysm repair 4/2019. ASA.    Pt lost 20lbs in last 6 months, not trying.    Pt states pain to right leg when laying down \"burning, not sure if it is my sciatic nerve or my neuropathy\"    .5PPD \"occassionaly, usually when I am drinking my beer\"    No abd or back pain in last year.     Questions for Faizer: \"I want something done about neuropathy\" \"dont think my gabapentin is doing it, need something stronger\"                    "

## 2021-06-17 NOTE — PROGRESS NOTES
[facility]   [facility]-Pelvic Initial Evaluation        [facility] Certification Request    May 9, 2018      Patient: Raf Dalton  MR Number: [id number]  YOB: 1950  Date of Visit: 5/9/2018      Dear Chavo Lazaro PA-C    Thank you for this referral.   We are seeing Raf Dalton for Physical Therapy of low back pain.    Medicare and/or Medicaid requires physician review and approval of the treatment plan. Please review the plan of care and verify that you agree with the therapy plan of care by co-signing this note.      If you have any questions or concerns, please don't hesitate to call.    Sincerely,      Luci Buchanan        Physician recommendation:     ___ Follow therapist's recommendation        ___ Modify therapy      *Physician co-signature indicates they certify the need for these services furnished within this plan and while under their care.        Patient Name: Raf Dalton  Date of evaluation: 5/9/2018  Referral Diagnosis: Lumbalgia  Referring provider: Chavo Lazaro PA-C  Visit Diagnosis:     ICD-10-CM    1. Acute right-sided low back pain with right-sided sciatica M54.41    2. Generalized muscle weakness M62.81        Assessment:      Pt. is appropriate for skilled PT intervention as outlined in the Plan of Care (POC).  Pt. is a good candidate for skilled PT services to improve pain levels and function.  Patient presents with history of low back pain with right buttock and leg symptoms, which has not been painful in a week.  He feels most of his symptoms have resolved [facility]  He did have pain which may have started from getting out of his car.  He did have an X-ray with the results below.  Feel patient will benefit from physical therapy to work on strengthening, stretches and learn different strategies to lift properly and hope to prevent symptoms from occurring in the future.  Strengthening his legs and core will also help his  stability if he feels he may fall at times.  Patient cares for his wife so feel it will be important to start a strengthening program to continue to stay healthy.  Patient is in agreement with this.     Goals:  Pt. will be independent with home exercise program in : 6 weeks  Pt will: Able to learn different strategies to help prevent symptoms from occurring or not as intense when they do occur within 4 weeks    Patient's expectations/goals are realistic.    Barriers to Learning or Achieving Goals:  Co-morbidities or other medical factors.  overweight, HTN, diabetes    POC, goals and pathology of condition were reviewed with the patient.  Patient is in agreement with the POC.       Plan / Patient Instructions:        Plan of Care:   Authorization / Certification Start Date: 05/09/18  Authorization / Certification End Date: 06/08/18  Authorization / Certification Number of Visits: 6  Communication with: Referral Source  Patient Related Instruction: Nature of Condition;Treatment plan and rationale;Self Care instruction;Basis of treatment;Body mechanics;Posture  Times per Week: 2  Number of Weeks: 4  Number of Visits: 6  Therapeutic Exercise: ROM;Stretching;Strengthening  Neuromuscular Reeducation: kinesio tape;posture;core  Manual Therapy: soft tissue mobilization;myofascial release;joint mobilization;muscle energy;strain counterstrain  Modalities: electrical stimulation    Plan for next visit: review stretches prn                                Add LE nerve glide prn                                Posture edu and body mechanics                               Strengthening for core, hips and LE     Subjective:         Social information:   Living Situation:condo and lives with others    Occupation:retired   Equipment Available: None, he does have a walker that he can use    History of Present Illness:    Raf is a 67 y.o. male who presents to therapy today with complaints of low back pain. Date of onset/duration of  symptoms is 2018. Onset was gradual with an insidious onset.  The pain may have started when patient was getting in and out of his car. He went to the doctor where he had an X-ray.  FINDINGS: 5 lumbar-type vertebral bodies. Vertebral body heights are maintained. No acute compression fracture deformity. Alignment is normal. Marked loss of disc space height L5-S1 and L2-L3 and moderate changes L1-L2 and L3-L4. Mild loss of disc space height L4-L5. Infrarenal abdominal aortic aneurysm better appreciated on previous CT and ultrasound. Mild to moderate lower lumbar facet arthropathy.  [facility]  Over the past week + much of the pain has resolved.  He may get mild tingling into his right lateral thigh.  He takes care of his handicapped wife who is a double amputee.  Symptoms are intermittent and getting better.     Pain Ratin  Pain rating at best: 0  Pain rating at worst: 10  Pain description: stiffness.   Prior to two weeks ago he felt burning, tingling, weakness, stiffness, aching    Functional limitations are described as occurring with:   manage symptoms    Patient reports benefit from:  stretching         Objective:      Note: Items left blank indicates the item was not performed or not indicated at the time of the evaluation.    Patient Outcome Measures :    Modified Oswestry Low Back Pain Disablity Questionnaire  in %: 48   Scores range from 0-100%, where a score of 0% represents minimal pain and maximal function. The minimal clinically important difference is a score reduction of 12%.  APTA score: 12    Examination  1. Acute right-sided low back pain with right-sided sciatica     2. Generalized muscle weakness       Involved side: Right  Posture Observation:      General standing posture is fair., bilateral pes planus                Pt does have diabetic shoes but does not wear them as they are not comfortable.    Lumbar ROM:         Within Normal Limits unless  noted  Date: 5/9/18     *Indicate scale AROM AROM AROM   Lumbar Flexion Hands to low shins     Lumbar Extension       Right Left Right Left Right Left   Lumbar Sidebending  pull       Lumbar Rotation         Thoracic Flexion      Thoracic Extension      Thoracic Sidebending         Thoracic Rotation           Lower Extremity Strength:     Date: 5/9/18     LE strength/5 Right Left Right Left Right Left   Hip Flexion (L1-3) 4+ 4+       Hip Extension (L5-S1)         Hip Abduction (L4-5) 5 5       Hip Adduction (L2-3) 4+ 4+       Hip External Rotation         Hip Internal Rotation         Knee Extension (L3-4) 5 5       Knee Flexion 5 5       Ankle Dorsiflexion (L4-5)         Great Toe Extension (L5)         Ankle Plantar flexion (S1)         Abdominals        Sensation           Reflex Testing  Lumbar Dermatomes Right Left UE Reflexes Right Left   Iliac Crest and Groin (L1) - - Biceps (C5-6)     Anterior Medial Thigh (L2) - - Brachioradialis (C5-6)     Anterior Thigh, Medial Epicondyle Femur (L3) - - Triceps (C7-8)     Lateral Thigh, Anterior Knee, Medial Leg/Malleolus (L4) - - Rosie s test     Lateral Leg, Dorsal Foot (L5) - - LE Reflexes     Lateral Foot (S1)   Patellar (L3-4)     Posterior Leg (S2)   Achilles (S1-2)     Other:   Babinski Response       Palpation: denies any tenderness to palpation  1 Leg Stance: L=1-2 seconds, R=6 seconds  Trendelenberg:  Unable to determine  Squat: bilateral knee adduction  Bridging: weakness demonstrated with quality of task    Lumbar Special Tests:     Lumbar Special Tests Right Left SI Tests Right  Left   Quadrant test   SI Compression - -   Straight leg raise - - SI Distraction     Crossover response   POSH Test     Slump - - Sacral Thrust     Sit-up test  FADIR     Trunk extensor endurance test  Resisted Abduction     Prone instability test  Other:     Pubic shotgun  Other:         LE Screen:  did not reproduce any symptoms, bilateral hip IR is decreased.      Exercises:  Exercise #1: Stretches: sitting hamstring, single knee to chest, lower trunk rotation, supine piriformis   hold 30 seconds x 1-3 reps    Treatment Today    Short appt secondary to pt in bathroom  TREATMENT MINUTES COMMENTS   Evaluation 30 lumbar   Self-care/ Home management     Manual therapy     Neuromuscular Re-education     Therapeutic Activity     Therapeutic Exercises 15 Edu on DX, POC, start of HEP-please see above or flow sheet   Gait training     Modality__________________                Total 45    Blank areas are intentional and mean the treatment did not include these items.       PT Evaluation Code: (Please list factors)  Patient History/Comorbidities: overweight, HTN, diabetes, neuropathies in feet from diabetes, COPD,  Examination: decrease in leg strength, abdominal weakness  Clinical Presentation: stable  Clinical Decision Making: low    Patient History/  Comorbidities Examination  (body structures and functions, activity limitations, and/or participation restrictions) Clinical Presentation Clinical Decision Making (Complexity)   No documented Comorbidities or personal factors 1-2 Elements Stable and/or uncomplicated Low   1-2 documented comorbidities or personal factor 3 Elements Evolving clinical presentation with changing characteristics Moderate   3-4 documented comorbidities or personal factors 4 or more Unstable and unpredictable High              Luci Buchanan, PT  5/9/2018  9:59 AM

## 2021-06-17 NOTE — PROGRESS NOTES
ASSESSMENT:     Diagnoses and all orders for this visit:    Lumbalgia  -     Ambulatory referral to Physical Therapy    Lumbar radiculitis  -     Ambulatory referral to Physical Therapy    Myofascial pain  -     Ambulatory referral to Physical Therapy            Raf Dalton is a 67 y.o. male  with a BMI of Body mass index is 34.85 kg/(m^2). who presents today in consultation at the request of Bekah Arreguin MD  for new patient evaluation of history of lumbalgia, with radicular pain to the right lower extremity that has resolved.  Patient symptoms started 4 weeks ago without a preceding trauma.  Patient symptoms has resolved after starting Medrol Dosepak, gabapentin 100 mg, Norco as needed for pain that was prescribed by his primary care provider.  Lumbar x-rays showed marked loss of disc space height at the L5-S1.  Patient symptoms are likely due to disc herniation at the lower lumbar spine.  No red flags.        ANGELICA:  14  WHO-5:  18    PLAN:  A shared decision making model was used.  The patient's values and choices were respected.  The following represents what was discussed and decided upon by the physician and the patient.      1.  DIAGNOSTIC TESTS:  lumbar x-rays on March 23 of 2018 that showed no acute compression fracture, subluxation, dislocation.  He has marked loss of disc space height at the L5-S1, L2-L3 and moderate changes at the L1-L2 and L3-L4.  I reviewed the lumbar x-ray imaging of the report with the patient today.  He verbalizes understanding.  2.  PHYSICAL THERAPY:  Discussed the importance of core strengthening, ROM, stretching exercises with the patient and how each of these entities is important in decreasing pain.  Explained to the patient that the purpose of physical therapy is to teach the patient a home exercise program.  These exercises need to be performed every day in order to decrease pain and prevent future occurrences of pain.  Likened it to brushing one's teeth.  I recommend  patient to start on physical therapy program for strengthening the core muscles.  Patient will start on physical therapy at [facility] in [place], [place].  His request.  3.  MEDICATIONS: Patient will restart on gabapentin 100 mg if his symptoms reappears.  4.  INTERVENTIONS: No therapeutic steroid injections at this time.   5.  PATIENT EDUCATION: I thoroughly discussed the plan with the patient today.  He verbalizes understanding and agrees with the plan.      FOLLOW-UP:   Patient will follow up with me in as needed.  All questions were answered.      FRANCESCA Bradley, MPA-C          SUBJECTIVE:  Raf Dalton  Is a 67 y.o. male who presents today for new patient evaluation of low back pain.  Patient presents to the clinic today reporting resolution of low back pain radiating to the right lower extremity that started 1 month ago.  Patient states that he had right-sided back pain radiating to the right gluteal right lateral posterior thigh, right lower leg and the right foot.  He was prescribed Medrol Dosepak, gabapentin 100 mg, and B[place]il from his primary care provider and had complete resolution of his symptoms.  Patient denies trauma to his lower back or lower extremity in the past.  He denies history of back surgeries, motor vehicle accidents,.  At this time he reports 0 out of 10 intensity pain in the lower back.  He has absolutely no radicular pain to the lower extremity.  He denies weakness in the lower extremity.  He had lumbar x-rays on March 23 of 2018 that showed no acute compression fracture, subluxation, dislocation.  He has marked loss of disc space height at the L5-S1, L2-L3 and moderate changes at the L1-L2 and L3-L4.  Patient denies urinary or bowel incontinence, unintentional weight loss, saddle anesthesia, fever or chills, balance difficulties.    Medications:    Current Outpatient Prescriptions   Medication Sig Dispense Refill     ACCU-CHEK TORI Misc        albuterol (PROAIR HFA;PROVENTIL  HFA;VENTOLIN HFA) 90 mcg/actuation inhaler Inhale 2 puffs every 6 (six) hours as needed for wheezing. 1 each 6     aspirin 81 MG EC tablet Take 1 tablet (81 mg total) by mouth daily.  0     blood glucose test (ACCU-CHEK SMARTVIEW TEST STRIP) strips Use 1 each As Directed 2 (two) times a day. Dispense brand per patient's insurance at pharmacy discretion. 200 strip 3     budesonide-formoterol (SYMBICORT) 80-4.5 mcg/actuation inhaler Inhale 2 puffs 2 (two) times a day. 1 Inhaler 12     buPROPion (WELLBUTRIN SR) 150 MG 12 hr tablet TAKE 1 TABLET BY MOUTH TWICE DAILY 180 tablet 2     cholecalciferol, vitamin D3, (VITAMIN D3) 5,000 unit Tab Take 1 tablet (5,000 Units total) by mouth daily. 90 tablet 3     gabapentin (NEURONTIN) 100 MG capsule Take 1 capsule by mouth at HS. May increase to capsules after 3 days and then 3 capsules at HS after 3 days 90 capsule 1     generic lancets (ACCU-CHEK FASTCLIX) USE TO TEST TWICE DAILY 200 each 3     glipiZIDE (GLUCOTROL) 5 MG tablet Take 1 tablet (5 mg total) by mouth 2 (two) times a day before meals. 1/2 Hour BEFORE meals 60 tablet 11     lisinopril (PRINIVIL,ZESTRIL) 20 MG tablet TAKE 1 TABLET BY MOUTH TWICE DAILY FOR BLOOD PRESSURE 180 tablet 3     metFORMIN (GLUCOPHAGE XR) 500 MG 24 hr tablet Take 2 tablets (1,000 mg total) by mouth 2 (two) times a day. 360 tablet 3     methylPREDNISolone (MEDROL DOSEPACK) 4 mg tablet follow package directions 21 tablet 0     simvastatin (ZOCOR) 40 MG tablet TAKE 1 AND 1/2 TABLETS BY MOUTH EVERY  tablet 3     terazosin (HYTRIN) 5 MG capsule Take 1 capsule (5 mg total) by mouth at bedtime. 90 capsule 3     testosterone (ANDROGEL) 1.62 % (40.5 mg/2.5 gram) GlPk Apply 1 pck daily 75 g 5     tiotropium (SPIRIVA WITH HANDIHALER) 18 mcg inhalation capsule INHALE CONTENTS OF 1 CAPSULE DAILY 30 capsule 5     HYDROcodone-acetaminophen (NORCO) 5-325 mg per tablet Take 1 tablet by mouth every 8 (eight) hours as needed for pain. 15 tablet 0     No  current facility-administered medications for this encounter.        Allergies: No Known Allergies    PMH:    Past Medical History:   Diagnosis Date     Aneurysm Of The Abdominal Aorta     Created by Conversion      Benign Prostatic Hypertrophy     Created by Conversion      Benign Prostatic Hypertrophy With Urinary Obstruction     Created by Conversion      Cataract     Created by Conversion      Cervicalgia     Created by Conversion      Chronic Obstructive Pulmonary Disease     Created by Conversion      Diabetes mellitus, type II      High cholesterol      Hyperglycemia     Created by Conversion      Hyperlipidemia     Created by Conversion      Hypertension     Created by Conversion      Hypoxia     Created by Conversion      Lower Back Sprain     Created by Conversion      Obstructive Sleep Apnea     Created by Conversion      Vitamin D Deficiency     Created by Conversion        PSH:    Past Surgical History:   Procedure Laterality Date     FINGER SURGERY         Family History:    Family History   Problem Relation Age of Onset     Snoring Father        Social History:   Social History     Social History     Marital status:      Spouse name: N/A     Number of children: N/A     Years of education: N/A     Occupational History     Not on file.     Social History Main Topics     Smoking status: Former Smoker     Packs/day: 0.25     Years: 54.00     Types: Cigarettes     Quit date: 10/18/2017     Smokeless tobacco: Never Used     Alcohol use 12.6 oz/week     21 Cans of beer per week      Comment: 21 beers per week     Drug use: No     Sexual activity: No     Other Topics Concern     Not on file     Social History Narrative       ROS: History of low back pain and radicular pain to the right lower extremity.  Specifically negative for bowel/bladder dysfunction, fevers,chills, appetite changes, unexplained weight loss.   Otherwise 13 systems reviewed are negative.  Please see the patient's intake  questionnaire from today for details.      OBJECTIVE:  PHYSICAL EXAMINATION:    CONSTITUTIONAL:  Vital signs as above.  No acute distress.  The patient is well nourished and well groomed.  PSYCHIATRIC:  The patient is awake, alert, oriented to person, place, time and answering questions appropriately with clear speech.    SKIN:  Skin over the face, bilateral lower extremities, and posterior torso is clean, dry, intact without rashes.    GAIT:  Gait is non-antalgic.  The patient is able to heel and toe walk without significant difficulty.    STANDING EXAMINATION: No tenderness at the lower back bilaterally.  MUSCLE STRENGTH:  The patient has 5/5 strength for the bilateral hip flexors, knee flexors/extensors, ankle dorsiflexors/plantar flexors, great toe extensors, ankle evertors/invertors.  NEUROLOGICAL:  2/4 patellar, medial hamstring, and achilles reflexes bilaterally.  Negative Babinski's bilaterally.  No ankle clonus bilaterally. Sensation to light touch is intact in the bilateral L4, L5, and S1 dermatomes.  VASCULAR:  2/4  pulses bilaterally.  Bilateral lower extremities are warm.  There is no pitting edema of the bilateral lower extremities.  ABDOMINAL:  Soft, non-distended, non-tender throughout all quadrants.  No pulsatile mass palpated in the left lower quadrant.  LYMPH NODES:  No palpable or tender inguinal/popliteal lymph nodes.  MUSCULOSKELETAL: Negative straight leg raise bilaterally.  Negative hip impingement Jimmie test bilaterally.      RESULTS:      XR LUMBAR SPINE 2 OR 3 VWS  3/23/2018 2:48 PM     INDICATION: Right buttock pain radiating down right leg.  COMPARISON: CT abdomen pelvis of 07/10/2017.     FINDINGS: 5 lumbar-type vertebral bodies. Vertebral body heights are maintained. No acute compression fracture deformity. Alignment is normal. Marked loss of disc space height L5-S1 and L2-L3 and moderate changes L1-L2 and L3-L4. Mild loss of disc space   height L4-L5. Infrarenal abdominal aortic  aneurysm better appreciated on previous CT and ultrasound. Mild to moderate lower lumbar facet arthropathy.

## 2021-06-17 NOTE — PATIENT INSTRUCTIONS - HE
Patient Instructions by Marilu Marin RN at 5/8/2019  1:40 PM     Author: Marilu Marin RN Service: -- Author Type: Registered Nurse    Filed: 5/8/2019  2:05 PM Encounter Date: 5/8/2019 Status: Signed    : Marilu Marin RN (Registered Nurse)       Patient Education       CTA SCHEDULED AT [facility] ON 5/6/2020 AT THE [facility]. CHECK IN AT 1215PM. NOTHING TO EAT OR DRINK THREE HOURS PRIOR.     Computed Tomography Angiography (CTA)     CTA can make 3D images, such as the carotid arteries shown here.     Computed tomography angiography (CTA) is an imaging test. It uses X-rays and computer technology to make detailed pictures of your arteries. Before the test, an X-ray dye (contrast medium) is injected into your vein. The dye makes it easier to see your blood vessels on the X-ray. Pictures are then taken with the CT scanner. A computer turns the images into 2-D and 3-D pictures.  Why CTA is done  CTA may be used to:    Check arteries in your belly, neck, lungs, pelvis, kidneys, or brain.    Look for a ballooning of the blood vessel wall (aneurysm) or a tear (dissection).    Check if a tube (stent) used to keep an artery open is working well.    Find damage to your arteries due to injuries.    Collect details on blood vessels that supply blood to tumors.  Getting ready for your test  Tell your health care provider if you:    Have diabetes    Have kidney disease    Are allergic to X-ray dye or other medicines    Are pregnant or think you may be pregnant    Are taking any medicines, herbs, or supplements, including prescription drugs, street drugs, and over-the-counter medicines such as aspirin or ibuprofen    You may be told not to eat or drink anything for a few hours before the CTA. Follow any other instructions from your health care provider.  During your test    You will be asked to remove any hair clips, jewelry, false teeth, or other metal items that could show up on the X-ray.    You  will lie down on the scanning table. An IV line will be put in a vein in your arm or hand.    The scanning table will be properly placed. The part of your body being checked will be inside the doughnut-shaped CT scanner.    One image may be taken first to be sure you are in the proper position for the test.    The IV will be hooked up to an automatic injection machine. This controls how often and how fast the X-ray dye is injected. The injection may continue during part of the exam.    The dye will be put into your vein through the IV line. You may feel warmth through your body when the dye is injected.    You cant move while the X-rays are being taken. Pillows and foam pads may be used to help you stay still. You will be told to hold your breath for 10 to 25 seconds at a time.    The whole procedure may take 10 to 25 minutes.  After your test    Drink plenty of fluids to help flush the X-ray dye from your body.    You may eat as soon as you want to.  Risks of CTA  All procedures have some risks. A CTA has some possible minor risks. These include:    Problems due to the X-ray dye, such as an allergic reaction or kidney damage    Skin damage from leaking X-ray dye near where the IV was put in   Date Last Reviewed: 10/1/2017    8924-8932 [employer]. [address] [place] [address], [place] [id number]. All rights reserved. This information is not intended as a substitute for professional medical care. Always follow your healthcare professional's instructions.

## 2021-06-17 NOTE — PROGRESS NOTES
[facility] Daily Progress     Patient Name: Raf Dalton  Date: 2018  Visit #: 2  PTA visit #:  0  Referral Diagnosis: Lumbar spine pain  Referring provider: Chavo Lazaro PA-C  Visit Diagnosis:     ICD-10-CM    1. Acute right-sided low back pain with right-sided sciatica M54.41    2. Generalized muscle weakness M62.81          Assessment:   Patient is partially compliant with the home program and encouraged patient to complete 2x/day at least. PT also provided education on stretching in the AM to help relieve stiffness.    HEP/POC compliance is  fair .  Patient is appropriate to continue with skilled physical therapy intervention, as indicated by initial plan of care.    Goal Status:  Pt. will be independent with home exercise program in : 6 weeks  Pt will: Able to learn different strategies to help prevent symptoms from occurring or not as intense when they do occur within 4 weeks    Plan / Patient Education:     Continue with initial plan of care.  Progress with home program as tolerated.    PLAN for next visit: progress as tolerated  Subjective:     Pain Ratin 3/10 in AM  He continues to do better overall. He is noting AM stiffness of about 3/10.  PT and patient discussed stretching in AM.       Objective:   Pt instructed on and gave handout for proper supine<--> sit mechnics  Exercises:  Exercise #1: Stretches: sitting hamstring, single knee to chest, lower trunk rotation, supine piriformis   hold 30 seconds x 1-3 reps  Comment #1: Bridge x 10  Exercise #2: B Heel/toe raises x 10  Comment #2: Standing B hip ABDuction x 10/leg alternating      Treatment Today     TREATMENT MINUTES COMMENTS   Evaluation     Self-care/ Home management     Manual therapy     Neuromuscular Re-education     Therapeutic Activity     Therapeutic Exercises 30 See flow sheet   Gait training     Modality__________________                Total 30    Blank areas are intentional and mean the treatment did not include  these items.       Micaela Figueroa  5/11/2018

## 2021-06-18 NOTE — PROGRESS NOTES
"[facility] Daily Progress     Patient Name: Raf Dalton  Date: 2018  Visit #: 18-18  PTA visit #:  0  Referral Diagnosis: Lumbar spine pain  Referring provider: Chavo Lazaro PA-C  Visit Diagnosis:     ICD-10-CM    1. Acute right-sided low back pain with right-sided sciatica M54.41    2. Generalized muscle weakness M62.81          Assessment:   Patient demonstrated good form and body mechanics during reviewed HEP exercises with verbal and tactile cueing. PT added a body weight squat to his HEP to increase LE strength in order to decrease strain on his lower back. While performing the exercises, patient noted fatigue primarily in his quadriceps but continued to work through the exercises. Patient needed breaks to catch his breath between exercises, but this was expected. Patient will progress his exercises by increasing the reps as he can tolerate while he continues in his HEP.    HEP/POC compliance is  fair .  Patient is appropriate to continue with skilled physical therapy intervention, as indicated by initial plan of care.    Goal Status:  Pt. will be independent with home exercise program in : 6 weeks  Pt will: Able to learn different strategies to help prevent symptoms from occurring or not as intense when they do occur within 4 weeks    Plan / Patient Education:     Continue with initial plan of care.  Progress with home program as tolerated.    PLAN for next visit: review HEP, progress as tolerated, 1 more visit.    Subjective:     Pain Ratin /10     Patient states that \"everything is getting better\" since he has started PT. Patient notes he is motivated to start an exercise program called [employer]. Patient notes not pain currently but that he left leg/hip feels a little tight.     Objective:   Patient instructed on mechanics of the hip, knee, and trunk as he performed exercises from his HEP as well as the newly added body weight squat exercise. Pt verbally " instructed and modified exercise to facilitate patient understanding of where the target muscle was located and ways to modify an exercise to better target weaker muscles.  Exercises:  Exercise #1: Stretches: sitting hamstring, single knee to chest, lower trunk rotation, supine piriformis   hold 30 seconds x 1-3 reps  Comment #1: Bridge x 15  Exercise #2: B Heel/toe raises x 15  Comment #2: Standing B hip ABDuction x 10/leg alternating  Exercise #3: nustep 5.5 minutes  Comment #3: Total gym level 22 B squats x 20  Exercise #4: High stepping in place at counter x 30 seconds  Comment #4: clamshells  X 20   TODAY  Exercise #5: all 4's  leg ext.   Hold 10 seconds X 3-6 reps    TODAY  Comment #5: heel and toe raises:  20 reps each  TODAY  Exercise #6: free standing squat x 10      Treatment Today     TREATMENT MINUTES COMMENTS   Evaluation     Self-care/ Home management     Manual therapy     Neuromuscular Re-education     Therapeutic Activity     Therapeutic Exercises 25 See flow sheet or above     Gait training     Modality__________________                Total 25    Blank areas are intentional and mean the treatment did not include these items.     Sushila Barth, SPT  Micaela Figueroa , PT  6/8/2018

## 2021-06-18 NOTE — PATIENT INSTRUCTIONS - HE
Patient Instructions by Kobe Weaver RN at 5/13/2020  2:30 PM     Author: Kobe Weaver RN Service: -- Author Type: Registered Nurse    Filed: 5/13/2020  2:45 PM Encounter Date: 5/13/2020 Status: Signed    : Kobe Weaver RN (Registered Nurse)       Aorta Duplex    Description  Abdominal aortic ultrasound examinations are performed to rule out an abdominal aneurysm. They are usually pain free and easy for the patient. The vascular laboratory will contain a bed and just two or three pieces of equipment. You will be asked raise your shirt to the level of the ribs. It usually takes about 30 minutes.  The technician will tuck a towel under your shirt and under your underpants at the level of the belly button. The gel is water-soluble and will not stain your skin or clothes.  Ultrasound gel, usually warmed for your comfort, will be placed on your abdomen.    Through the gel, the technician will apply a small hand-held device to your abdomen that emits sound waves.  When the test is completed, the technician will remove excess gel from your abdomen.  Risks  There are typically no side effects or complications associated with aorta duplex ultrasound examinations.  How to Prepare  Patients will need to fast 8 hours before an abdominal aortic duplex ultrasound. Fasting for 8 hours is crucial for an accurate and reliable exam.  If it is critical for the patient to take medications, they can be consumed with a small sip of water.  What Can I Expect After the Test?  The technician will send the ultrasound images to your vascular surgeon for evaluation. Typically, a report is available in 2-3 days. If anything critical is found, it is standard practice to notify the vascular surgeon immediately.  Reference: https://vascular.org/patient-resources/vascular-tests    Patient Education     CT Scan     During the test, relax and remain as still as you can.   CT scan is a test that combines X-rays and computer scans. The result is a  "detailed picture that can show problems with soft tissues (such as the lining of your sinuses), organs (such as your kidneys or lungs), blood vessels, and bones.  Tell the technologist   Be sure to tell the technologist if you:    Have allergies or kidney problems    Take diabetes medicine    Are pregnant or think you may be    Ate or drank anything before the test  Before your test    Be sure to tell your healthcare provider if you have ever had a reaction to contrast material (\"X-ray dye\"). If you have had a reaction, you may need to take medicine before your scan, so be sure to tell your provider ahead of time.     Be sure to mention the medicines you take. Ask if it's OK to take them before the test.     Follow any directions youre given for not eating or drinking before the procedure. Your provider will give you instructions if required. You may be required to drink contrast by mouth before arriving for the study depending on the type of exam you are having. Your provider or the imaging site will provide this for you.    The length of the procedure may vary, depending on your condition and your provider's practices.    Arrive on time to check in.    When you arrive, you may be asked to change into a hospital gown. Remove all metal near the part of your body that will be scanned, including jewelry, eyeglasses, and dentures. Women may need to remove any bra that has metal underwire.     During your test    You may be given contrast through an IV (intravenous) line or by mouth.    You will lie on a table. The table slides into the CT scanner.    The technologist will ask you to hold your breath for a few seconds during your scan.    After your test    You can go back to your normal diet and activities right away. Any contrast will pass naturally through your body within a day.    Before leaving, you may need to wait briefly while your images are being reviewed. Your healthcare provider will discuss the test " results with you during a follow-up appointment or over the phone.    Your next appointment is:__________________    Date Last Reviewed: 6/1/2019 2000-2019 [employer]. [address] [place] [address], [place] [address]. All rights reserved. This information is not intended as a substitute for professional medical care. Always follow your healthcare professional's instructions.

## 2021-06-18 NOTE — PROGRESS NOTES
[facility] Daily Progress     Patient Name: Raf Dalton  Date: 2018  Visit #: 3  PTA visit #:  0  Referral Diagnosis: Lumbar spine pain  Referring provider: Chavo Lazaro PA-C  Visit Diagnosis:     ICD-10-CM    1. Acute right-sided low back pain with right-sided sciatica M54.41    2. Generalized muscle weakness M62.81          Assessment:   Patient is partially compliant with the home program and encouraged patient to complete 2x/day at least. PT also provided education on stretching in the AM to help relieve stiffness. Patient requires some cueing to stay on task.    HEP/POC compliance is  fair .  Patient is appropriate to continue with skilled physical therapy intervention, as indicated by initial plan of care.    Goal Status:  Pt. will be independent with home exercise program in : 6 weeks  Pt will: Able to learn different strategies to help prevent symptoms from occurring or not as intense when they do occur within 4 weeks    Plan / Patient Education:     Continue with initial plan of care.  Progress with home program as tolerated.    PLAN for next visit: progress as tolerated  Subjective:     Pain Ratin 2/10 in AM  Patient continues to note soreness in the AM when getting out of bed. He has to sleep on the L side and is unable to roll over.  He states that he is trying to complete the exercise. He is doing them       Objective:   Pt instructed on and gave handout for proper supine<--> sit mechnics  Exercises:  Exercise #1: Stretches: sitting hamstring, single knee to chest, lower trunk rotation, supine piriformis   hold 30 seconds x 1-3 reps  Comment #1: Bridge x 10  Exercise #2: B Heel/toe raises x 10  Comment #2: Standing B hip ABDuction x 10/leg alternating      Treatment Today     TREATMENT MINUTES COMMENTS   Evaluation     Self-care/ Home management     Manual therapy     Neuromuscular Re-education     Therapeutic Activity     Therapeutic Exercises 25 See flow sheet   Gait training      Modality__________________                Total 25    Blank areas are intentional and mean the treatment did not include these items.       Micaela Figueroa  5/21/2018

## 2021-06-18 NOTE — LETTER
Letter by Zoe Carrera MD at      Author: Zoe Carrera MD Service: -- Author Type: --    Filed:  Encounter Date: 3/12/2019 Status: (Other)       Bekah Castanon MD  [address]  [facility] [address]  [facility] [address]                                  March 12, 2019    Patient: Raf Dalton   MR Number: [id number]   YOB: 1950   Date of Visit: 3/12/2019     Dear Dr. Lg MD:    Thank you for referring Raf Dalton to me for evaluation. Below are the relevant portions of my assessment and plan of care.    If you have questions, please do not hesitate to call me. I look forward to following Raf along with you.    Sincerely,        Zoe Carrera MD          CC  MD Debi Lamb Sakina Batool, MD  3/12/2019  8:08 PM  Sign at close encounter  CCx:Pre-op evaluation prior to a AAA repair    HPI:Mr. Dalton is a 68-year-old gentleman with a past medical history significant for moderate obesity, obstructive sleep apnea, HTN, low back pain, hypercholesterolemia, hyperglycemia, type 2 diabetes, BPH and asthma/COPD who presents to clinic for the aforementioned chief complaint.  He was last seen by Dr. Caballero in our clinic in March 2017 time he had describe that his dyspnea was worsening in the setting of his wife smoking in the house and exertion;  He had also noted compliance with his CPAP machine at the time.  At the time he had been using Symbicort and Spiriva.  He recently presented to the emergency room with hypoxia and was noted to have an oxygen saturation of 84% in the clinic.  He had a benign exam and was administered 2 L of supplemental oxygen with significant improvement in his saturations because of which he was discharged to follow-up with us as an outpatient.  He is presently compliant with his Symbicort and Spiriva inhalers and states that he only uses his albuterol inhaler sparingly. He continues to be compliant with his CPAP at night and bleeds supplemental oxygen through this. He  is due to undergo a AAA repair and was due to undergo a NM stress test and a cardiac MRI earlier this week. Unfortunately, the MRI machine was not working and this test has been rescheduled.  He specifically denies fevers, chills, night sweats, chest pain, wheezing or abdominal pain.    ROS:  Pertinent positives alluded to in the HPI. Remainder of 10 point ROS is negative.       PMH:  1. Morbid obesity  2. JOSE  3. HTN  4. Low back pain  5. Hypercholesterolemia  6. Hyperglycemia  7. Type 2 diabetes  8. BPH  9. Asthma/COPD overlap  10. Hypoxia     Allergies:  Reviewed in Epic.    Family HX:  Family History   Problem Relation Age of Onset   ? Snoring Father        Social Hx:  Social History     Socioeconomic History   ? Marital status:      Spouse name: None   ? Number of children: None   ? Years of education: None   ? Highest education level: None   Occupational History   ? None   Social Needs   ? Financial resource strain: None   ? Food insecurity:     Worry: None     Inability: None   ? Transportation needs:     Medical: None     Non-medical: None   Tobacco Use   ? Smoking status: Former Smoker     Packs/day: 1.50     Years: 55.00     Pack years: 82.50     Types: Cigarettes     Last attempt to quit: 10/18/2017     Years since quittin.3   ? Smokeless tobacco: Never Used   Substance and Sexual Activity   ? Alcohol use: Yes     Alcohol/week: 12.6 oz     Types: 21 Cans of beer per week     Comment: 21 beers per week   ? Drug use: No   ? Sexual activity: No     Partners: Female     Birth control/protection: None   Lifestyle   ? Physical activity:     Days per week: None     Minutes per session: None   ? Stress: None   Relationships   ? Social connections:     Talks on phone: None     Gets together: None     Attends [employer] service: None     Active member of club or organization: None     Attends meetings of clubs or organizations: None     Relationship status: None   ? Intimate partner violence:     Fear of  current or ex partner: None     Emotionally abused: None     Physically abused: None     Forced sexual activity: None   Other Topics Concern   ? None   Social History Narrative   ? None     Current Meds:  Current Outpatient Medications   Medication Sig Dispense Refill   ? ACCU-CHEK TORI Misc      ? ACCU-CHEK SMARTVIEW TEST STRIP strips TEST BLOOD SUGAR TWICE DAILY 200 strip 3   ? albuterol (PROAIR HFA;PROVENTIL HFA;VENTOLIN HFA) 90 mcg/actuation inhaler Inhale 2 puffs every 6 (six) hours as needed for wheezing. 1 each 6   ? albuterol (PROVENTIL) 2.5 mg /3 mL (0.083 %) nebulizer solution Take 3 mL (2.5 mg total) by nebulization every 6 (six) hours as needed for wheezing or shortness of breath. 25 vial 2   ? aspirin 81 MG EC tablet Take 1 tablet (81 mg total) by mouth daily.  0   ? budesonide-formoterol (SYMBICORT) 80-4.5 mcg/actuation inhaler Inhale 2 puffs 2 (two) times a day. 1 Inhaler 12   ? buPROPion (WELLBUTRIN SR) 150 MG 12 hr tablet TAKE 1 TABLET BY MOUTH TWICE DAILY 180 tablet 2   ? cholecalciferol, vitamin D3, (VITAMIN D3) 5,000 unit Tab Take 1 tablet (5,000 Units total) by mouth daily. 90 tablet 3   ? gabapentin (NEURONTIN) 300 MG capsule Take 1 capsule (300 mg total) by mouth 3 (three) times a day. 270 capsule 3   ? generic lancets (ACCU-CHEK FASTCLIX) USE TO TEST TWICE DAILY 200 each 3   ? lisinopril (PRINIVIL,ZESTRIL) 20 MG tablet TAKE 1 TABLET TWICE DAILY FOR BLOOD PRESSURE 180 tablet 1   ? metFORMIN (GLUCOPHAGE XR) 500 MG 24 hr tablet Take 2 tablets (1,000 mg total) by mouth daily with breakfast. 180 tablet 3   ? simvastatin (ZOCOR) 40 MG tablet TAKE 1 AND 1/2 TABLETS EVERY  tablet 3   ? terazosin (HYTRIN) 10 MG capsule Take 1 capsule (10 mg total) by mouth at bedtime. 90 capsule 3   ? tiotropium (SPIRIVA WITH HANDIHALER) 18 mcg inhalation capsule INHALE CONTENTS OF 1 CAPSULE DAILY 30 capsule 5   ? traZODone (DESYREL) 50 MG tablet Take 1 tablet (50 mg total) by mouth at bedtime. As needed for  sleep 90 tablet 0   ? testosterone (ANDROGEL) 1.62 % (40.5 mg/2.5 gram) GlPk Apply 1 pck daily 75 g 5     No current facility-administered medications for this visit.      Labs:  Current Outpatient Medications   Medication Sig Dispense Refill   ? ACCU-CHEK TORI Misc      ? ACCU-CHEK SMARTVIEW TEST STRIP strips TEST BLOOD SUGAR TWICE DAILY 200 strip 3   ? albuterol (PROAIR HFA;PROVENTIL HFA;VENTOLIN HFA) 90 mcg/actuation inhaler Inhale 2 puffs every 6 (six) hours as needed for wheezing. 1 each 6   ? albuterol (PROVENTIL) 2.5 mg /3 mL (0.083 %) nebulizer solution Take 3 mL (2.5 mg total) by nebulization every 6 (six) hours as needed for wheezing or shortness of breath. 25 vial 2   ? aspirin 81 MG EC tablet Take 1 tablet (81 mg total) by mouth daily.  0   ? budesonide-formoterol (SYMBICORT) 80-4.5 mcg/actuation inhaler Inhale 2 puffs 2 (two) times a day. 1 Inhaler 12   ? buPROPion (WELLBUTRIN SR) 150 MG 12 hr tablet TAKE 1 TABLET BY MOUTH TWICE DAILY 180 tablet 2   ? cholecalciferol, vitamin D3, (VITAMIN D3) 5,000 unit Tab Take 1 tablet (5,000 Units total) by mouth daily. 90 tablet 3   ? gabapentin (NEURONTIN) 300 MG capsule Take 1 capsule (300 mg total) by mouth 3 (three) times a day. 270 capsule 3   ? generic lancets (ACCU-CHEK FASTCLIX) USE TO TEST TWICE DAILY 200 each 3   ? lisinopril (PRINIVIL,ZESTRIL) 20 MG tablet TAKE 1 TABLET TWICE DAILY FOR BLOOD PRESSURE 180 tablet 1   ? metFORMIN (GLUCOPHAGE XR) 500 MG 24 hr tablet Take 2 tablets (1,000 mg total) by mouth daily with breakfast. 180 tablet 3   ? simvastatin (ZOCOR) 40 MG tablet TAKE 1 AND 1/2 TABLETS EVERY  tablet 3   ? terazosin (HYTRIN) 10 MG capsule Take 1 capsule (10 mg total) by mouth at bedtime. 90 capsule 3   ? tiotropium (SPIRIVA WITH HANDIHALER) 18 mcg inhalation capsule INHALE CONTENTS OF 1 CAPSULE DAILY 30 capsule 5   ? traZODone (DESYREL) 50 MG tablet Take 1 tablet (50 mg total) by mouth at bedtime. As needed for sleep 90 tablet 0   ?  "testosterone (ANDROGEL) 1.62 % (40.5 mg/2.5 gram) GlPk Apply 1 pck daily 75 g 5     No current facility-administered medications for this visit.      Imaging studies:  CT CAP 8/8/18:  1.  Infrarenal abdominal aortic aneurysm measuring 5 cm in maximal dimension. No significant change in comparison to previous study. Proximal neck measures 2.5 cm in diameter with an approximate length of 2.9 cm. 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   2.  Right calcified granuloma with associated right hilar calcified nodes. Findings consistent with old granulomatous disease.  3.  Ectasia of the ascending thoracic aorta measuring 3.9 cm.    PFT's 3/6/19:  FEV1/FVC is 56% and is reduced.  FEV1 is 1.53L (47%) predicted and is reduced.  FVC is 2.72L (63%) predicted and reduced.  There was improvement in spirometry after a single inhaled dose of bronchodilator.  TLC is 7.93L (111%) predicted and is normal.  RV is 4.81L (182%) predicted and is increased.  DLCO is 23.53ml/min/hg (98%) predicted and is normal when it is corrected for hemoglobin.  Flow volume loops indicate scooping of the expiratory limb.    Impression:  Full Pulmonary Function Test is abnormal.  PFTs are consistent with severe obstructive disease.  Spirometry is consistent with reversibility.  There is no hyperinflation.  There is air-trapping.  Diffusion capacity when corrected for hemoglobin is normal.     Physical Exam:  /82   Pulse 74   Ht 5' 10\" (1.778 m)   Wt (!) 236 lb 5 oz (107.2 kg)   SpO2 94%   BMI 33.91 kg/m     No Data Recorded  Physical Exam   Constitutional: He is oriented to person, place, and time. He appears well-developed and well-nourished.   Very pleasant gentleman.   HENT:   Head: Normocephalic and atraumatic.   Eyes: Pupils are equal, round, and reactive to light.   Neck: Normal range of motion.   Cardiovascular: Normal rate and regular rhythm.   Pulmonary/Chest: Effort normal and " breath sounds normal.   Abdominal: Soft.   Musculoskeletal: Normal range of motion. He exhibits no edema.   Neurological: He is alert and oriented to person, place, and time.   Skin: Skin is warm.   Psychiatric: He has a normal mood and affect.         Assessment and Plan:Raf Dalton is a 68 y.o. with a past medical history significant for moderate obesity, obstructive sleep apnea, HTN, low back pain, hypercholesterolemia, hyperglycemia, type 2 diabetes, BPH and asthma/COPD who presents to clinic today for a pre-op evaluation prior to a AAA. For the present we would recommend;    1. Asthma/COPD:Is noted to have severe obstruction on his spirometry with reversibility, air trapping and no evidence of diffusion limitation. His symptoms seems to be well controlled on his current regimen on bronchodilators.    Continue Symbicort two times a day; was reminded to gargle after using this.    Continue Spiriva daily.    As needed albuterol for rescue.  2. Pre-operative Evaluation: Given that he is having a AAA repair, has a history of COPD and is aged between 60-69y, he has a class 4 risk, which translates to a 10.1% risk of respiratory failure post operatively.  3. Follow-up:6 months with Dr. Caballero.      Zoe Carrera  Pulmonary and Critical Care  [id number]

## 2021-06-18 NOTE — PROGRESS NOTES
[facility] Daily Progress     Patient Name: Raf Dalton  Date: 2018  Visit #: 4/6  18-18  PTA visit #:  0  Referral Diagnosis: Lumbar spine pain  Referring provider: Bekah Castanon MD  Visit Diagnosis:     ICD-10-CM    1. Acute right-sided low back pain with right-sided sciatica M54.41    2. Generalized muscle weakness M62.81          Assessment:   Patient demonstrated good technique with all of the new exercises for his HEP.  He was able to feel the correct muscle engagement which will help strengthen his core and LE to help support his low back  Patient's legs fatigued quickly and required frequent breaks.  He will continue to work on increasing the reps as the days and weeks continue    HEP/POC compliance is  fair .  Patient is appropriate to continue with skilled physical therapy intervention, as indicated by initial plan of care.    Goal Status:  Pt. will be independent with home exercise program in : 6 weeks  Pt will: Able to learn different strategies to help prevent symptoms from occurring or not as intense when they do occur within 4 weeks    Plan / Patient Education:     Continue with initial plan of care.  Progress with home program as tolerated.    PLAN for next visit: progress as tolerated, 1-2 more visits    Subjective:     Pain Ratin 2/10 in AM, as he moves around it subsides    Patient continues to note soreness in the AM when getting out of bed. I know it is the bed as it is too firm for me.  It is helpful for my wife but hurts my back   ( suggested trying a piece of foam or something on his side of his bed to add a layer of softness)  Otherwise the exercises are going well.     Objective:   Pt instructed on and gave handout for proper supine<--> sit mechnics  Exercises:  Exercise #1: Stretches: sitting hamstring, single knee to chest, lower trunk rotation, supine piriformis   hold 30 seconds x 1-3 reps  Comment #1: Bridge x 15  Exercise #2: B Heel/toe raises x  15  Comment #2: Standing B hip ABDuction x 10/leg alternating  Exercise #3: Recumbent bike x 5 minutes  Comment #3: Total gym level 20 B squats x 15   TODAY  Exercise #4: High stepping in place at counter x 30 seconds  Comment #4: clamshells  X 20   TODAY  Exercise #5: all 4's  leg ext.   Hold 10 seconds X 3-6 reps    TODAY  Comment #5: heel and toe raises:  20 reps each  TODAY      Treatment Today     TREATMENT MINUTES COMMENTS   Evaluation     Self-care/ Home management     Manual therapy     Neuromuscular Re-education     Therapeutic Activity     Therapeutic Exercises 25 See flow sheet or above  Edu/discussed mattresses   Gait training     Modality__________________                Total 25    Blank areas are intentional and mean the treatment did not include these items.       Luci Buchanan , PT  5/30/2018

## 2021-06-18 NOTE — LETTER
Letter by Bekah Castanon MD at      Author: Bekah Castanon MD Service: -- Author Type: --    Filed:  Encounter Date: 2/15/2019 Status: (Other)       Raf Dalton  [address]  [place] [address]             February 15, 2019         Dear Mr. Dalton,    Below are the results from your recent visit:    Resulted Orders   Glycosylated Hemoglobin A1c   Result Value Ref Range    Hemoglobin A1c 6.0 3.5 - 6.0 %   Comprehensive Metabolic Panel   Result Value Ref Range    Sodium 138 136 - 145 mmol/L    Potassium 4.2 3.5 - 5.0 mmol/L    Chloride 102 98 - 107 mmol/L    CO2 25 22 - 31 mmol/L    Anion Gap, Calculation 11 5 - 18 mmol/L    Glucose 193 (H) 70 - 125 mg/dL    BUN 10 8 - 22 mg/dL    Creatinine 0.94 0.70 - 1.30 mg/dL    GFR MDRD Af Amer >60 >60 mL/min/1.73m2    GFR MDRD Non Af Amer >60 >60 mL/min/1.73m2    Bilirubin, Total 1.0 0.0 - 1.0 mg/dL    Calcium 8.9 8.5 - 10.5 mg/dL    Protein, Total 6.6 6.0 - 8.0 g/dL    Albumin 3.6 3.5 - 5.0 g/dL    Alkaline Phosphatase 61 45 - 120 U/L    AST 22 0 - 40 U/L    ALT 26 0 - 45 U/L    Narrative    Fasting Glucose reference range is 70-99 mg/dL per  American Diabetes Association (ADA) guidelines.   HM2(CBC w/o Differential)   Result Value Ref Range    WBC 6.0 4.0 - 11.0 thou/uL    RBC 4.79 4.40 - 6.20 mill/uL    Hemoglobin 15.4 14.0 - 18.0 g/dL    Hematocrit 47.5 40.0 - 54.0 %    MCV 99 80 - 100 fL    MCH 32.2 27.0 - 34.0 pg    MCHC 32.5 32.0 - 36.0 g/dL    RDW 10.6 (L) 11.0 - 14.5 %    Platelets 153 140 - 440 thou/uL    MPV 7.6 7.0 - 10.0 fL       Lab results look okay.  Diabetes is under good control.  Please stop glipizide as we discussed and continue metformin 1000 mg daily.  Hemoglobin is normal.  Kidney and liver function tests are normal.    Please call with questions or contact us using MyChart.    Sincerely,        Electronically signed by Bekah Castanon MD       
No

## 2021-06-19 NOTE — LETTER
Letter by Bekah Castanon MD at      Author: Bekah aCstanon MD Service: -- Author Type: --    Filed:  Encounter Date: 4/2/2019 Status: (Other)         Raf Dalton  [address]  [place] [address]             April 2, 2019         Dear Mr. Dalton,    Below are the results from your recent visit:    Resulted Orders   Glycosylated Hemoglobin A1c   Result Value Ref Range    Hemoglobin A1c 7.1 (H) 3.5 - 6.0 %   Hemoglobin   Result Value Ref Range    Hemoglobin 15.2 14.0 - 18.0 g/dL   Comprehensive Metabolic Panel   Result Value Ref Range    Sodium 143 136 - 145 mmol/L    Potassium 5.0 3.5 - 5.0 mmol/L    Chloride 104 98 - 107 mmol/L    CO2 30 22 - 31 mmol/L    Anion Gap, Calculation 9 5 - 18 mmol/L    Glucose 144 (H) 70 - 125 mg/dL    BUN 12 8 - 22 mg/dL    Creatinine 0.91 0.70 - 1.30 mg/dL    GFR MDRD Af Amer >60 >60 mL/min/1.73m2    GFR MDRD Non Af Amer >60 >60 mL/min/1.73m2    Bilirubin, Total 0.7 0.0 - 1.0 mg/dL    Calcium 9.8 8.5 - 10.5 mg/dL    Protein, Total 7.2 6.0 - 8.0 g/dL    Albumin 4.0 3.5 - 5.0 g/dL    Alkaline Phosphatase 68 45 - 120 U/L    AST 19 0 - 40 U/L    ALT 23 0 - 45 U/L    Narrative    Fasting Glucose reference range is 70-99 mg/dL per  American Diabetes Association (ADA) guidelines.       Lab results are okay.  Kidney function and electrolytes are normal.  Liver enzymes are normal.    Please call with questions or contact us using AVST.    Sincerely,        Electronically signed by Bekah Castanon MD

## 2021-06-19 NOTE — PROGRESS NOTES
VASCULAR SURGERY CLINIC CONSULTATION    VASCULAR SURGEON: Marquita Brian MD    LOCATION:  [facility]    Raf Dalton   Medical Record #:  [id number]  YOB: 1950  Age:  68 y.o.     Date of Service: 2018    PRIMARY CARE PROVIDER: Bekah Castanon MD      Reason for visit: Elevation of abdominal aortic aneurysm    IMPRESSION: 5.3 cm abdominal aortic aneurysm that is asymptomatic and unchanged in size from prior visit 6 months ago.    RECOMMENDATION/RISKS: Return visit in 6 months with noncontrast CT abdomen pelvis.    HPI:  Raf Dalton is a 68 y.o. male who was seen today in consultation for abdominal aortic aneurysm that was being followed by Dr. Stark.  Discovered several years ago incidentally on imaging for what he does not recall.  Never had any symptoms from it.  Has been over 5 cm but under 5.5 for his last 3 visits.  Quit smoking in October of last year.  No family history of abdominal aortic aneurysm.  Has COPD.  On puffers.  No active cardiac disease or prior cardiac history.  Prior type 2 diabetes.  No prior strokes.  On aspirin and statin.    Father may have  from an MI.    No other active new health issues.    REVIEW OF SYSTEMS:    A 12 point ROS was reviewed and except for what is listed in the HPI above, all others are negative    PHH:    Past Medical History:   Diagnosis Date     Aneurysm Of The Abdominal Aorta     Created by Conversion      Benign Prostatic Hypertrophy     Created by Conversion      Benign Prostatic Hypertrophy With Urinary Obstruction     Created by Conversion      Cataract     Created by Conversion      Cervicalgia     Created by Conversion      Chronic Obstructive Pulmonary Disease     Created by Conversion      Diabetes mellitus, type II (H)      High cholesterol      Hyperglycemia     Created by Conversion      Hyperlipidemia     Created by Conversion      Hypertension     Created by Conversion      Hypoxia     Created by Conversion      Lower  Back Sprain     Created by Conversion      Obstructive Sleep Apnea     Created by Conversion      Vitamin D Deficiency     Created by Conversion         Past Surgical History:   Procedure Laterality Date     FINGER SURGERY         ALLERGIES:  Review of patient's allergies indicates no known allergies.    MEDS:    Current Outpatient Prescriptions:      ACCU-CHEK TORI Misc, , Disp: , Rfl:      ACCU-CHEK SMARTVIEW TEST STRIP strips, TEST BLOOD SUGAR TWICE DAILY, Disp: 200 strip, Rfl: 3     albuterol (PROAIR HFA;PROVENTIL HFA;VENTOLIN HFA) 90 mcg/actuation inhaler, Inhale 2 puffs every 6 (six) hours as needed for wheezing., Disp: 1 each, Rfl: 6     aspirin 81 MG EC tablet, Take 1 tablet (81 mg total) by mouth daily., Disp: , Rfl: 0     budesonide-formoterol (SYMBICORT) 80-4.5 mcg/actuation inhaler, Inhale 2 puffs 2 (two) times a day., Disp: 1 Inhaler, Rfl: 12     buPROPion (WELLBUTRIN SR) 150 MG 12 hr tablet, TAKE 1 TABLET BY MOUTH TWICE DAILY, Disp: 180 tablet, Rfl: 2     cholecalciferol, vitamin D3, (VITAMIN D3) 5,000 unit Tab, Take 1 tablet (5,000 Units total) by mouth daily., Disp: 90 tablet, Rfl: 3     gabapentin (NEURONTIN) 100 MG capsule, TAKE 1 CAPSULE BY MOUTH AT BEDTIME. MAY INCREASE TO 2 CAPSULES AFTER 3 DAYS AND THEN 3 CAPSULES AT BEDTIME AFTER 3 DAYS, Disp: 90 capsule, Rfl: 0     generic lancets (ACCU-CHEK FASTCLIX), USE TO TEST TWICE DAILY, Disp: 200 each, Rfl: 3     glipiZIDE (GLUCOTROL) 5 MG tablet, Take 1 tablet (5 mg total) by mouth 2 (two) times a day before meals. 1/2 Hour BEFORE meals, Disp: 60 tablet, Rfl: 11     HYDROcodone-acetaminophen (NORCO) 5-325 mg per tablet, Take 1 tablet by mouth every 8 (eight) hours as needed for pain., Disp: 15 tablet, Rfl: 0     lisinopril (PRINIVIL,ZESTRIL) 20 MG tablet, TAKE 1 TABLET BY MOUTH TWICE DAILY FOR BLOOD PRESSURE, Disp: 180 tablet, Rfl: 3     metFORMIN (GLUCOPHAGE XR) 500 MG 24 hr tablet, Take 2 tablets (1,000 mg total) by mouth 2 (two) times a day., Disp:  "360 tablet, Rfl: 3     simvastatin (ZOCOR) 40 MG tablet, TAKE 1 AND 1/2 TABLETS BY MOUTH EVERY DAY, Disp: 135 tablet, Rfl: 3     terazosin (HYTRIN) 5 MG capsule, Take 1 capsule (5 mg total) by mouth at bedtime., Disp: 90 capsule, Rfl: 3     testosterone (ANDROGEL) 1.62 % (40.5 mg/2.5 gram) GlPk, Apply 1 pck daily, Disp: 75 g, Rfl: 5     tiotropium (SPIRIVA WITH HANDIHALER) 18 mcg inhalation capsule, INHALE CONTENTS OF 1 CAPSULE DAILY, Disp: 30 capsule, Rfl: 5    SOCIAL HABITS:    History   Smoking Status     Former Smoker     Packs/day: 0.25     Years: 54.00     Types: Cigarettes     Quit date: 10/18/2017   Smokeless Tobacco     Never Used       History   Alcohol Use     12.6 oz/week     21 Cans of beer per week     Comment: 21 beers per week       History   Drug Use No       FAMILY HISTORY:    Family History   Problem Relation Age of Onset     Snoring Father            PE:  /72  Pulse 76  Temp 98.3  F (36.8  C)  Ht 5' 10\" (1.778 m)  Wt (!) 237 lb 14.4 oz (107.9 kg)  BMI 34.14 kg/m2  Wt Readings from Last 1 Encounters:   08/08/18 (!) 237 lb 14.4 oz (107.9 kg)     Body mass index is 34.14 kg/(m^2).    EXAM:  GENERAL: This is a well-developed 68 y.o. male who appears his stated age  EYES: Grossly normal.  MOUTH: Buccal mucosa normal   MUSCULOSKELETAL: Grossly normal and both lower extremities are intact.  HEME/LYMPH: No lymphedema  NEUROLOGIC: Focally intact, Alert and oriented x 3.   PSYCH: appropriate affect  INTEGUMENT: No open lesions or ulcers          DIAGNOSTIC STUDIES:     Images:  No results found.    I personally reviewed the images and my interpretation is his aortic aneurysm is clearly seen on ultrasound and is less than 5 and half centimeters in size..    LABS:      Sodium   Date Value Ref Range Status   03/23/2018 143 136 - 145 mmol/L Final   10/12/2017 143 136 - 145 mmol/L Final   10/31/2016 137 136 - 145 mmol/L Final     Potassium   Date Value Ref Range Status   03/23/2018 4.8 3.5 - 5.0 " mmol/L Final   10/12/2017 4.9 3.5 - 5.0 mmol/L Final   10/31/2016 4.5 3.5 - 5.0 mmol/L Final     Chloride   Date Value Ref Range Status   03/23/2018 103 98 - 107 mmol/L Final   10/12/2017 102 98 - 107 mmol/L Final   10/31/2016 101 98 - 107 mmol/L Final     BUN   Date Value Ref Range Status   03/23/2018 8 8 - 22 mg/dL Final   10/12/2017 13 8 - 22 mg/dL Final   10/31/2016 15 8 - 22 mg/dL Final     Creatinine   Date Value Ref Range Status   03/23/2018 0.79 0.70 - 1.30 mg/dL Final   10/12/2017 1.11 0.70 - 1.30 mg/dL Final   10/31/2016 0.88 0.70 - 1.30 mg/dL Final     Hemoglobin   Date Value Ref Range Status   07/26/2016 15.2 14.0 - 18.0 g/dL Final   04/01/2016 15.1 14.0 - 18.0 g/dL Final   07/17/2012 15.1 14.0 - 18.0 g/dL Final     Platelets   Date Value Ref Range Status   07/26/2016 168 140 - 440 thou/uL Final   07/17/2012 170 140 - 440 thou/uL Final   05/03/2010 192 140 - 440 thou/uL Final     Marquita Biran MD  VASCULAR SURGERY

## 2021-06-19 NOTE — LETTER
Letter by Bekah Castanon MD at      Author: Bekah Castanon MD Service: -- Author Type: --    Filed:  Encounter Date: 8/23/2019 Status: (Other)         Raf Dalton  [address]  [place] [address]             August 23, 2019         Dear Mr. Dalton,    Below are the results from your recent visit:    Resulted Orders   Glycosylated Hemoglobin A1c   Result Value Ref Range    Hemoglobin A1c 6.2 (H) 3.5 - 6.0 %     Your diabetes is under good control.    Please call with questions or contact us using [employer].    Sincerely,        Electronically signed by Bekah Castanon MD

## 2021-06-19 NOTE — LETTER
Letter by Bekah Castanon MD at      Author: Bekah Castanon MD Service: -- Author Type: --    Filed:  Encounter Date: 11/5/2019 Status: Signed         [address]af [address]  [place] [address]             November 5, 2019         Dear Mr. Dalton,    Below are the results from your recent visit:    Resulted Orders   Microalbumin, Random Urine   Result Value Ref Range    Microalbumin, Random Urine 0.85 0.00 - 1.99 mg/dL    Creatinine, Urine 75.2 mg/dL    Microalbumin/Creatinine Ratio Random Urine 11.3 <=19.9 mg/g    Narrative    Microalbumin, Random Urine  <2.0 mg/dL . . . . . . . . Normal  3.0-30.0 mg/dL . . . . . . Microalbuminuria  >30.0 mg/dL . . . . . .  . Clinical Proteinuria    Microalbumin/Creatinine Ratio, Random Urine  <20 mg/g . . . . .. . . . Normal   mg/g . . . . . . . Microalbuminuria  >300 mg/g . . . . . . . . Clinical Proteinuria           Urine test results are normal.    Please call with questions or contact us using [employer].    Sincerely,        Electronically signed by Bekah Castanon MD

## 2021-06-19 NOTE — LETTER
Letter by Bekah Castanon MD at      Author: Bekah Castanon MD Service: -- Author Type: --    Filed:  Encounter Date: 8/23/2019 Status: (Other)         08/23/19     Raf Dalton  [address]  [place] [address]          Dear Raf,       When you were in the hospital, the discharging provider recommended you schedule a Medication Therapy Management (MTM) appointment. MTM is designed to help you get the most of out of your medications. We will focus primarily on your medications from your most recent hospitalization.     During an MTM appointment, you will meet with a specially trained pharmacist to review all of your medications, both prescription and over-the-counter. This pharmacist is available at [facility] and works closely with Dr. Castanon. They will make sure your medications are the best choice for you, safe, and working well. The MTM pharmacist works together with you and your doctor to help you understand your medications, solve any problems related to your medications, and help you meet your health goals.     To make an appointment, please call the MTM scheduling line at [phone] and toll free [phone].      We look forward to hearing from you!    Sincerely,       Caryl Mcclelland, Pharm.D., [facility]  Medication Therapy Management Pharmacist  [facility]

## 2021-06-19 NOTE — LETTER
Letter by Bekah Castanon MD at      Author: Bekah Castanon MD Service: -- Author Type: --    Filed:  Encounter Date: 4/18/2019 Status: (Other)         Raf Dalton  [address]  [place] [address]             April 18, 2019         Dear Mr. Dalton,    Below are the results from your recent visit:    Resulted Orders   HM2(CBC w/o Differential)   Result Value Ref Range    WBC 7.1 4.0 - 11.0 thou/uL    RBC 4.20 (L) 4.40 - 6.20 mill/uL    Hemoglobin 13.4 (L) 14.0 - 18.0 g/dL    Hematocrit 41.3 40.0 - 54.0 %    MCV 98 80 - 100 fL    MCH 32.0 27.0 - 34.0 pg    MCHC 32.5 32.0 - 36.0 g/dL    RDW 11.5 11.0 - 14.5 %    Platelets 273 140 - 440 thou/uL    MPV 6.9 (L) 7.0 - 10.0 fL   Comprehensive Metabolic Panel   Result Value Ref Range    Sodium 136 136 - 145 mmol/L    Potassium 4.6 3.5 - 5.0 mmol/L    Chloride 99 98 - 107 mmol/L    CO2 26 22 - 31 mmol/L    Anion Gap, Calculation 11 5 - 18 mmol/L    Glucose 161 (H) 70 - 125 mg/dL    BUN 14 8 - 22 mg/dL    Creatinine 0.97 0.70 - 1.30 mg/dL    GFR MDRD Af Amer >60 >60 mL/min/1.73m2    GFR MDRD Non Af Amer >60 >60 mL/min/1.73m2    Bilirubin, Total 0.5 0.0 - 1.0 mg/dL    Calcium 9.2 8.5 - 10.5 mg/dL    Protein, Total 6.8 6.0 - 8.0 g/dL    Albumin 3.4 (L) 3.5 - 5.0 g/dL    Alkaline Phosphatase 68 45 - 120 U/L    AST 19 0 - 40 U/L    ALT 22 0 - 45 U/L    Narrative    Fasting Glucose reference range is 70-99 mg/dL per  American Diabetes Association (ADA) guidelines.   Glycosylated Hemoglobin A1c   Result Value Ref Range    Hemoglobin A1c 7.0 (H) 3.5 - 6.0 %       Results look okay.  Kidney function is normal.  Liver enzymes are normal.    Please call with questions or contact us using MyChart.    Sincerely,        Electronically signed by Bekah Castanon MD

## 2021-06-19 NOTE — PROGRESS NOTES
6 mo f/u - abdominal aortic aneurysm. Previous US showed the aneurysm measures 5.3 x 5.2 cm. He has been doing well, with no significant abdominal or back pain.  HTN, DM2. ASA, statin. Pt is having neuropathy pain in bilateral feet, right worse than left. He states that he has increased his gabapentin to 300mg at bedtime.

## 2021-06-19 NOTE — LETTER
Letter by Robert Rivero MD at      Author: Robert Rivero MD Service: -- Author Type: --    Filed:  Encounter Date: 3/19/2019 Status: (Other)         Raf Dalton  [address]  [place] [address]             March 19, 2019         Dear Mr. Dalton,    Below are the results from your recent visit:    Resulted Orders   IgE Allergen Panel Respiratory with Total IgE   Result Value Ref Range    Aspergillus fumigatus IgE <0.35 <0.35 kU/L    Alternaria Alternata IgE <0.35 <0.35 kU/L    Cladosporium herbarum IgE <0.35 <0.35 kU/L    Cat Dander IgE <0.35 <0.35 kU/L    Cockroach,Mauritian IgE <0.35 <0.35 kU/L    Common Ragweed IgE <0.35 <0.35 kU/L    [place]on Tree IgE <0.35 <0.35 kU/L    D farinae IgE <0.35 <0.35 kU/L    Dog Dander <0.35 <0.35 kU/L    D. pteronyssinus IgE <0.35 <0.35 kU/L    Elm Tree IgE <0.35 <0.35 kU/L    [place] Blue Grass IgE <0.35 <0.35 kU/L    Maple/Edina IgE <0.35 <0.35 kU/L    Oak IgE <0.35 <0.35 kU/L    Orchard Grass IgE <0.35 <0.35 kU/L    Common Silver Birch IgE <0.35 <0.35 kU/L    Jesus Grass IgE <0.35 <0.35 kU/L    White Primitivo IgE <0.35 <0.35 kU/L    Immunoglobulin E 55.60 0.00 - 100.00 kU/L    Narrative    ImmunoCAP Specific IgE Blood Test Quantitative Scoring                        IgE (kU/L  Level  -----------------------------------------------------------------------------    <0.35   Absent/undetectable    0.35-0.69  Low    0.70-3.49  Moderate    3.50-17.49  High    >=17.50  Very High  -----------------------------------------------------------------------------  *Please note, a high or low specific IgE level may not   necessarily correlate to the degree of symptom severity,   as each person's symptomatic threshold varies.       Your allergy tests are negative.    Please call with questions or contact us using "[employer].    Sincerely,        Electronically signed by Robert Rivero MD

## 2021-06-19 NOTE — LETTER
Letter by Bekah Castanon MD at      Author: Bekah Castanon MD Service: -- Author Type: --    Filed:  Encounter Date: 5/15/2019 Status: (Other)         May 15, 2019     Patient: Raf Dalton   YOB: 1950   Date of Visit: 5/15/2019       To Whom It May Concern:    Raf Dalton is under my care at the [facility]. He has a pet dog that is needed by him for emotional support.    If you have any questions or concerns, please don't hesitate to call.    Sincerely,        Electronically signed by Bekah Castanon MD

## 2021-06-19 NOTE — PROGRESS NOTES
[facility] Discharge Summary  Patient Name: Raf Dalton  Date: 7/9/2018  Referral Diagnosis: lumbar pain  Referring provider: Chavo Lazaro PA-C  Visit Diagnosis:   1. Acute right-sided low back pain with right-sided sciatica     2. Generalized muscle weakness         Goals:  Pt. will be independent with home exercise program in : 6 weeks  Pt will: Able to learn different strategies to help prevent symptoms from occurring or not as intense when they do occur within 4 weeks    Patient was seen for 5 visits from 5/9/18 to 6/8/18 with 0 missed appointments.  Patient cancelled last appt.    Therapy will be discontinued at this time.  The patient will need a new referral to resume.    Thank you for your referral.  Micaela Figueroa  7/9/2018  9:54 AM

## 2021-06-20 NOTE — LETTER
Letter by Bekah Castanon MD at      Author: Bekah Castanon MD Service: -- Author Type: --    Filed:  Encounter Date: 3/17/2020 Status: (Other)         Raf Dalton  [address]  [place] [address]             March 17, 2020         Dear Mr. Dalton,    Below are the results from your recent visit:    Resulted Orders   Glycosylated Hemoglobin A1c   Result Value Ref Range    Hemoglobin A1c 5.6 3.5 - 6.0 %   Comprehensive Metabolic Panel   Result Value Ref Range    Sodium 139 136 - 145 mmol/L    Potassium 4.3 3.5 - 5.0 mmol/L    Chloride 102 98 - 107 mmol/L    CO2 27 22 - 31 mmol/L    Anion Gap, Calculation 10 5 - 18 mmol/L    Glucose 102 70 - 125 mg/dL    BUN 12 8 - 28 mg/dL    Creatinine 0.80 0.70 - 1.30 mg/dL    GFR MDRD Af Amer >60 >60 mL/min/1.73m2    GFR MDRD Non Af Amer >60 >60 mL/min/1.73m2    Bilirubin, Total 0.7 0.0 - 1.0 mg/dL    Calcium 9.3 8.5 - 10.5 mg/dL    Protein, Total 6.8 6.0 - 8.0 g/dL    Albumin 3.7 3.5 - 5.0 g/dL    Alkaline Phosphatase 77 45 - 120 U/L    AST 16 0 - 40 U/L    ALT 16 0 - 45 U/L    Narrative    Fasting Glucose reference range is 70-99 mg/dL per  American Diabetes Association (ADA) guidelines.   Lipid Cascade RANDOM   Result Value Ref Range    Cholesterol 133 <=199 mg/dL    Triglycerides 79 <=149 mg/dL    HDL Cholesterol 42 >=40 mg/dL    LDL Calculated 75 <=129 mg/dL    Patient Fasting > 8hrs? Yes    Thyroid Stimulating Hormone (TSH)   Result Value Ref Range    TSH 1.19 0.30 - 5.00 uIU/mL   HM1 (CBC with Diff)   Result Value Ref Range    WBC 7.4 4.0 - 11.0 thou/uL    RBC 4.54 4.40 - 6.20 mill/uL    Hemoglobin 14.8 14.0 - 18.0 g/dL    Hematocrit 43.3 40.0 - 54.0 %    MCV 95 80 - 100 fL    MCH 32.6 27.0 - 34.0 pg    MCHC 34.2 32.0 - 36.0 g/dL    RDW 12.5 11.0 - 14.5 %    Platelets 200 140 - 440 thou/uL    MPV 7.9 7.0 - 10.0 fL    Neutrophils % 72 (H) 50 - 70 %    Lymphocytes % 19 (L) 20 - 40 %    Monocytes % 6 2 - 10 %    Eosinophils % 2 0 - 6 %    Basophils % 1 0 - 2 %     Neutrophils Absolute 5.4 2.0 - 7.7 thou/uL    Lymphocytes Absolute 1.4 0.8 - 4.4 thou/uL    Monocytes Absolute 0.5 0.0 - 0.9 thou/uL    Eosinophils Absolute 0.1 0.0 - 0.4 thou/uL    Basophils Absolute 0.0 0.0 - 0.2 thou/uL     Lab results look great!  Diabetes is under good control. Cholesterol levels are very good. Kidney and liver function tests are normal.     Please call with questions or contact us using [employer].    Sincerely,        Electronically signed by Bekah Castanon MD

## 2021-06-21 NOTE — PROGRESS NOTES
Assessment/Plan:     1. Type 2 diabetes mellitus with complication, without long-term current use of insulin (H)  Glycosylated Hemoglobin A1c    Comprehensive Metabolic Panel    Lipid Cascade RANDOM    Vitamin B12    Microalbumin, Random Urine   2. Diaphoresis  HM2(CBC w/o Differential)    Thyroid Stimulating Hormone (TSH)   3. Vitamin D deficiency  Vitamin D, Total (25-Hydroxy)   4. BPH (benign prostatic hyperplasia)  Ambulatory referral to Urology   5. Cough  XR Chest 2 Views   6. COPD exacerbation (H)     7. JOSE (obstructive sleep apnea)     8. Peripheral neuropathy     9. Aneurysm Of The Abdominal Aorta     10. Mixed hyperlipidemia     11. Hypertension     12. Low testosterone in male         Diagnoses and all orders for this visit:    Type 2 diabetes mellitus with complication, without long-term current use of insulin (H)  -     Glycosylated Hemoglobin A1c  -     Comprehensive Metabolic Panel  -     Vitamin B12  -     Microalbumin, Random Urine  -Lipid Cascade  -Blood pressure is controlled.  A1c is at goal.  Continue current dose of metformin and glipizide.  Continue current dose of lisinopril.  Continue statin and aspirin.  Follow-up in 3 months.  Check above labs today.      Diaphoresis  -     HM2(CBC w/o Differential)  -     Thyroid Stimulating Hormone (TSH)  -This is not a new symptom.  Could be a little bit more prominent recently due to symptoms of COPD exacerbation as well as occasional episodes of hypoglycemia.  We will check hemogram and TSH today.  Will treat COPD exacerbation.  He had stress test in November 2017 that was negative.    Vitamin D deficiency  -     Vitamin D, Total (25-Hydroxy)    BPH (benign prostatic hyperplasia)  -     Ambulatory referral to Urology  -Increase terazosin to 10 mg daily    Cough  -     XR Chest 2 Views  -Chest x-ray was reviewed and is negative    COPD exacerbation (H)  Continue current inhalers.  Start azithromycin.  Counseled on use of medication and side  effects.  Follow-up if symptoms worsening or not improving.    JOSE (obstructive sleep apnea)  Compliant with CPAP    Peripheral neuropathy  Increase gabapentin to 300 mg twice daily and then increase to 300 mg 3 times daily after 1 week as tolerated.    Aneurysm Of The Abdominal Aorta  Followed at vascular center.  Next follow-up in February 2019.  Continue efforts at risk factor reduction including control of blood pressure, diabetes and lipids.  Continue aspirin and statin.    Mixed hyperlipidemia  Check lipid cascade.  Continue statin    Hypertension  Controlled.  Continue current medications    Low testosterone in male  Not able to afford cost of testosterone replacement     Healthcare maintenance  -     Pneumococcal polysaccharide vaccine 23-valent 3 yo or older, subq/IM  -Pneumovax booster administered today.  Counseled on vaccine and side effects.  Already received influenza vaccine for this season.               Subjective:      Raf Dalton is a 68 y.o. male who comes in today for follow-up on diabetes as well as multiple other chronic medical problems.  He is accompanied today by his wife.  He has not been seen in about 6 months.  Reviewed interim health history.  He has had follow-up at the vascular center regarding his abdominal aortic aneurysm.  He is scheduled again for six-month follow-up in February 2019 with repeat CT of the abdomen.  Currently asymptomatic in regards to the aneurysm and continues with observation.  He has diabetes and remains on glipizide and metformin.  Recently has noticed some fluctuations in his blood sugars.  Has had a couple episodes of hypoglycemia.  This is associated with symptoms of weakness and dizziness.  He has had a cold for the past couple of weeks.  He attributes the lower blood sugars to possibly eating better.  They have tried making some dietary changes.  He has lost about 8 pounds since last office visit.  Reports that generally blood sugars range between 120  and 150.  He is experiencing some increased nocturnal urinary frequency.  He is currently terrazosin 5 mg daily.  He reports that he is compliant with use of his CPAP machine.  He is states he has had eye exam in 2018.  He continues to experience some symptoms of sleep difficulty and diaphoresis.  States he has always been someone who sweats a lot.  He has been diagnosed with low testosterone by Dr. Katz.  However insurance did not cover testosterone replacement.  Over the past couple of weeks he has had cold symptoms.  This has been associated with phlegm in the throat, frequent coughing, rhinorrhea and sinus congestion.  Denies increased dyspnea or fevers or chills.  Occasionally produces some phlegm that is a little bit greenish in color.  He is also complaining of increased symptoms of neuropathy in his feet.  He currently has prescription for gabapentin 100 mg.  States that this really does not do much.  Tried taking his wife's 300 mg capsules and that seems to work a little bit better.  Does not experience drowsiness with the gabapentin.  Review of systems is assessed and is otherwise negative.  No other concerns or questions today.    Current Outpatient Prescriptions   Medication Sig Dispense Refill     ACCU-CHEK TORI Misc        ACCU-CHEK SMARTVIEW TEST STRIP strips TEST BLOOD SUGAR TWICE DAILY 200 strip 3     albuterol (PROAIR HFA;PROVENTIL HFA;VENTOLIN HFA) 90 mcg/actuation inhaler Inhale 2 puffs every 6 (six) hours as needed for wheezing. 1 each 6     aspirin 81 MG EC tablet Take 1 tablet (81 mg total) by mouth daily.  0     budesonide-formoterol (SYMBICORT) 80-4.5 mcg/actuation inhaler Inhale 2 puffs 2 (two) times a day. 1 Inhaler 12     buPROPion (WELLBUTRIN SR) 150 MG 12 hr tablet TAKE 1 TABLET BY MOUTH TWICE DAILY 180 tablet 2     cholecalciferol, vitamin D3, (VITAMIN D3) 5,000 unit Tab Take 1 tablet (5,000 Units total) by mouth daily. 90 tablet 3     generic lancets (ACCU-CHEK FASTCLIX) USE TO  TEST TWICE DAILY 200 each 3     glipiZIDE (GLUCOTROL) 5 MG tablet Take 1 tablet (5 mg total) by mouth 2 (two) times a day before meals. 1/2 Hour BEFORE meals 60 tablet 11     HYDROcodone-acetaminophen (NORCO) 5-325 mg per tablet Take 1 tablet by mouth every 8 (eight) hours as needed for pain. 15 tablet 0     lisinopril (PRINIVIL,ZESTRIL) 20 MG tablet TAKE 1 TABLET TWICE DAILY FOR BLOOD PRESSURE 180 tablet 1     metFORMIN (GLUCOPHAGE XR) 500 MG 24 hr tablet Take 2 tablets (1,000 mg total) by mouth 2 (two) times a day. 360 tablet 3     simvastatin (ZOCOR) 40 MG tablet TAKE 1 AND 1/2 TABLETS BY MOUTH EVERY  tablet 3     tiotropium (SPIRIVA WITH HANDIHALER) 18 mcg inhalation capsule INHALE CONTENTS OF 1 CAPSULE DAILY 30 capsule 5     azithromycin (ZITHROMAX) 250 MG tablet Take 2 tabs on day 1 and then 1 tab daily for next 4 days 6 tablet 0     gabapentin (NEURONTIN) 300 MG capsule Take 1 capsule (300 mg total) by mouth 3 (three) times a day. 270 capsule 3     terazosin (HYTRIN) 10 MG capsule Take 1 capsule (10 mg total) by mouth at bedtime. 90 capsule 3     testosterone (ANDROGEL) 1.62 % (40.5 mg/2.5 gram) GlPk Apply 1 pck daily 75 g 5     No current facility-administered medications for this visit.        Past Medical History, Family History, and Social History reviewed.  Past Medical History:   Diagnosis Date     Aneurysm Of The Abdominal Aorta     Created by Conversion      Benign Prostatic Hypertrophy     Created by Conversion      Benign Prostatic Hypertrophy With Urinary Obstruction     Created by Conversion      Cataract     Created by Conversion      Cervicalgia     Created by Conversion      Chronic Obstructive Pulmonary Disease     Created by Conversion      Diabetes mellitus, type II (H)      High cholesterol      Hyperglycemia     Created by Conversion      Hyperlipidemia     Created by Conversion      Hypertension     Created by Conversion      Hypoxia     Created by Conversion      Lower Back Sprain      Created by Conversion      Obstructive Sleep Apnea     Created by Conversion      Vitamin D Deficiency     Created by Conversion      Past Surgical History:   Procedure Laterality Date     FINGER SURGERY       Review of patient's allergies indicates no known allergies.  Family History   Problem Relation Age of Onset     Snoring Father      Social History     Social History     Marital status:      Spouse name: N/A     Number of children: N/A     Years of education: N/A     Occupational History     Not on file.     Social History Main Topics     Smoking status: Former Smoker     Packs/day: 0.25     Years: 54.00     Types: Cigarettes     Quit date: 10/18/2017     Smokeless tobacco: Never Used     Alcohol use 12.6 oz/week     21 Cans of beer per week      Comment: 21 beers per week     Drug use: No     Sexual activity: No     Other Topics Concern     Not on file     Social History Narrative         Review of systems is as stated in HPI, and the remainder of the 10 system review is otherwise negative.    Objective:     Vitals:    10/22/18 1439   BP: 137/68   Patient Site: Right Arm   Patient Position: Sitting   Cuff Size: Adult Large   Pulse: 98   Temp: 98.7  F (37.1  C)   TempSrc: Oral   SpO2: 91%   Weight: (!) 239 lb 9 oz (108.7 kg)    Body mass index is 34.37 kg/(m^2).      General appearance: alert, appears stated age and cooperative  Head: Normocephalic, without obvious abnormality, atraumatic  Eyes: negative  Ears: normal TM's and external ear canals both ears  Nose: Nares normal. Septum midline. Mucosa normal. No drainage or sinus tenderness.  Throat: lips, mucosa, and tongue normal; teeth and gums normal  Neck: no adenopathy and thyroid not enlarged, symmetric, no tenderness/mass/nodules  Lungs: clear to auscultation bilaterally  Heart: regular rate and rhythm, S1, S2 normal, no murmur, click, rub or gallop  Extremities: extremities normal, atraumatic, no cyanosis or edema and foot exam is normal,  monofilament sensation shows decreased sensation over right great toe  Pulses: 2+ and symmetric    Results: Chest x-ray was performed in clinic and personally reviewed.  No infiltrate present    This note has been dictated using voice recognition software. Any grammatical or context distortions are unintentional and inherent to the the software.

## 2021-06-21 NOTE — LETTER
Letter by Bekah Castanon MD at      Author: Bekah Castanon MD Service: -- Author Type: --    Filed:  Encounter Date: 11/3/2020 Status: (Other)         Raf Dalton  [address]  [place] [address]             November 3, 2020         Dear Mr. Dalton,    Below are the results from your recent visit:    Resulted Orders   Glycosylated Hemoglobin A1c   Result Value Ref Range    Hemoglobin A1c 5.4 <=5.6 %      Comment:      Normal <5.7% Prediabete 5.7-6.4% Diabletes 6.5% or higher - adopted from ADA consensus guidelines   Comprehensive Metabolic Panel   Result Value Ref Range    Sodium 141 136 - 145 mmol/L    Potassium 4.8 3.5 - 5.0 mmol/L    Chloride 104 98 - 107 mmol/L    CO2 27 22 - 31 mmol/L    Anion Gap, Calculation 10 5 - 18 mmol/L    Glucose 92 70 - 125 mg/dL    BUN 15 8 - 28 mg/dL    Creatinine 1.28 0.70 - 1.30 mg/dL    GFR MDRD Af Amer >60 >60 mL/min/1.73m2    GFR MDRD Non Af Amer 55 (L) >60 mL/min/1.73m2    Bilirubin, Total 0.5 0.0 - 1.0 mg/dL    Calcium 9.2 8.5 - 10.5 mg/dL    Protein, Total 7.1 6.0 - 8.0 g/dL    Albumin 3.9 3.5 - 5.0 g/dL    Alkaline Phosphatase 68 45 - 120 U/L    AST 15 0 - 40 U/L    ALT 14 0 - 45 U/L    Narrative    Fasting Glucose reference range is 70-99 mg/dL per  American Diabetes Association (ADA) guidelines.   Lipid Cascade RANDOM   Result Value Ref Range    Cholesterol 127 <=199 mg/dL    Triglycerides 108 <=149 mg/dL    HDL Cholesterol 39 (L) >=40 mg/dL    LDL Calculated 66 <=129 mg/dL    Patient Fasting > 8hrs? Yes    Vitamin B12   Result Value Ref Range    Vitamin B-12 203 (L) 213 - 816 pg/mL   Folate, Serum   Result Value Ref Range    Folate 10.1 >=3.5 ng/mL   Testosterone, Total   Result Value Ref Range    Testosterone 255 221 - 716 ng/dL       Your diabetes is under good control. Your cholesterol levels are good. Your kidney and liver function tests are good. Your B12 level is low. I recommend that you start a daily B12 supplement. This may help improve your  fatigue and some of your neuropathy.  I will send this to your pharmacy.  Your testosterone level is at the lower end of the normal range.     Please call with questions or contact us using [employer].    Sincerely,        Electronically signed by Bekah Castanon MD

## 2021-06-23 NOTE — TELEPHONE ENCOUNTER
Needs office visit to discuss med change.  Needs to bring in blood sugar readings so I can review and adjust meds appropriately.  If having low blood sugars, would recommend he reduce glipizide to once daily until he is seen in clinic

## 2021-06-23 NOTE — TELEPHONE ENCOUNTER
Spoke with pt, advised he should schedule an office visit to discuss blood sugars, He was asked to bring in recorded blood sugar readings for review . Medication may need to be adjusted depending on his readings. Pt agrees and will schedule an office visit with DR Castanon.

## 2021-06-23 NOTE — TELEPHONE ENCOUNTER
Pt called, states his blood sugars have been running a little low , 77-82. He feels dizzy when this happens. He would like to discuss this with Dr Castanon by phone, he is also having increased numbness and tingling in his feet and would like to try a medication to help with that. He doesn't want to make an appointment at this time.

## 2021-06-24 NOTE — PROGRESS NOTES
Assessment/Plan:     1. Acute respiratory failure with hypoxia (H)     2. Type 2 diabetes mellitus (H)  metFORMIN (GLUCOPHAGE XR) 500 MG 24 hr tablet    Glycosylated Hemoglobin A1c   3. Hypotension, unspecified hypotension type  Comprehensive Metabolic Panel    HM2(CBC w/o Differential)    Electrocardiogram Perform and Read   4. Cough  albuterol nebulizer solution 3 mL (PROVENTIL)    Nebulizer    XR Chest 2 Views       Diagnoses and all orders for this visit:    Acute respiratory failure with hypoxia (H)  Patient presents today with oxygen of 84% on room air.  This is a significant change from his baseline.  Patient reports that he does have an oximeter at home and monitors his oxygen and that frequently it is in the 80s.  However we do not have any documentation of oxygen levels in this range in his medical record.  He does not chronically use oxygen.  He does have oxygen that he uses with his CPAP machine that he reports but he has not been compliant with use of CPAP machine.  He did receive albuterol nebulizer treatment in clinic.  Oxygen level improved only marginally to about 86% on room air.  He was placed on 2.5 L of oxygen with oxygen sats remaining around 90%.  Because of this acute change in status and recent hypotension earlier this morning, recommend the patient be transported to emergency department by EMS for further evaluation and treatment.  Patient initially resistant to this recommendation as he was very concerned about leaving his wife home alone.  His wife has bilateral above-the-knee amputations and needs quite a bit of assistance.  We were able to contact his wife and explained the recommendation for emergency room evaluation.  His wife was able to contact with her daughter who will come and stay with her this evening.  Patient then agreed to go to emergency department for evaluation.  Spoke with emergency department provider and notified them of patient's status and reasoning for ED  recommendation.    Type 2 diabetes mellitus (H)  -     metFORMIN (GLUCOPHAGE XR) 500 MG 24 hr tablet  Dispense: 180 tablet; Refill: 3  -     Glycosylated Hemoglobin A1c  -Recommend patient discontinue glipizide due to episodes of hypoglycemia.  Continue metformin at this time.  Did plan to check A1c at this visit but ultimately this was not dryness patient was sent to emergency department.  Will follow up with patient again for further medication adjustments.    Hypotension, unspecified hypotension type  -     Comprehensive Metabolic Panel  -     HM2(CBC w/o Differential)  -     Electrocardiogram Perform and Read  -EKG does not show acute changes.  Did initially plan to check hemogram and comprehensive metabolic panel the patient was sent to emergency department for further evaluations of these labs were not drawn.  Could be component of dehydration as patient admits to not drinking enough fluids and does drink quite a bit of alcohol.  Further evaluation per emergency department provider.    Cough  -     albuterol nebulizer solution 3 mL (PROVENTIL)  -     Nebulizer  -     XR Chest 2 Views  -Due to COPD.  Not compliant with inhalers.  Chest x-ray is negative today.  -Recommend pt have nebulizer machine and albuterol solution available for use at home to treat cough, wheezing and dyspnea as needed due to underlying COPD. Today's visit will serve as a face to face encounter for patient to have nebulizer machine, supplies and albuterol solution.    Healthcare maintenance  -     Td, Preservative Free (green label)  -Counseled on vaccine and side effects.                 Subjective:      Raf Dalton is a 68 y.o. male who comes in today due to concern about hypotension.  He has history of type 2 diabetes, COPD and abdominal aortic aneurysm.  He was seen this morning at the vascular center for routine follow-up on his aneurysm.  He has some additional testing ordered including stress test, pulmonary function tests and  pulmonary consult in anticipation of repair of this aneurysm.  While at his appointment his blood pressure was noted to be 80/54 and on recheck 82/60.  His vascular surgeon wanted him to be seen for further evaluation so he was scheduled for appointment today in clinic.  We reviewed his health history.  He reports that he is feeling well.  Currently his blood pressure is still low at 90/50.  We reviewed his medications and allergies and updated the chart.  He states that he has not been compliant with use of his CPAP machine for treatment of sleep apnea.  He also has not been compliant with use of his inhalers for management of COPD.  He does have a chronic cough but he reports that it is at baseline and not unchanged.  It is not more frequent and it is not more productive.  He has not had fevers or chills.  He does have dyspnea with exertion.  He has not had new concerns with chest pressure or tightness in the chest.  He does report he has been having intermittent episodes of dizziness and lightheadedness.  Also his blood sugars have been fluctuating and he has been having frequent episodes of hypoglycemia.  He is currently on glipizide and metformin.  He has lost weight.  On review of the records he was 247 pounds last March and today he is 233 pounds.  In October 2017 he was 253 pounds.  He reports no increased lower extremity edema.  He complains of paresthesias in his feet.  He also complains of irritability.  He does drink about 21 cans of beer per week.  He does admit that he probably does not drink enough water.  He has a history of tobacco use but is not a current smoker.  His wife still smokes.  Review of systems is assessed and is otherwise negative.  No other concerns or questions today.    Current Outpatient Medications   Medication Sig Dispense Refill     ACCU-CHEK [facility]        ACCU-CHEK SMARTVIEW TEST STRIP strips TEST BLOOD SUGAR TWICE DAILY 200 strip 3     albuterol (PROAIR HFA;PROVENTIL  HFA;VENTOLIN HFA) 90 mcg/actuation inhaler Inhale 2 puffs every 6 (six) hours as needed for wheezing. 1 each 6     aspirin 81 MG EC tablet Take 1 tablet (81 mg total) by mouth daily.  0     budesonide-formoterol (SYMBICORT) 80-4.5 mcg/actuation inhaler Inhale 2 puffs 2 (two) times a day. 1 Inhaler 12     buPROPion (WELLBUTRIN SR) 150 MG 12 hr tablet TAKE 1 TABLET BY MOUTH TWICE DAILY 180 tablet 2     cholecalciferol, vitamin D3, (VITAMIN D3) 5,000 unit Tab Take 1 tablet (5,000 Units total) by mouth daily. 90 tablet 3     gabapentin (NEURONTIN) 300 MG capsule Take 1 capsule (300 mg total) by mouth 3 (three) times a day. 270 capsule 3     generic lancets (ACCU-CHEK FASTCLIX) USE TO TEST TWICE DAILY 200 each 3     lisinopril (PRINIVIL,ZESTRIL) 20 MG tablet TAKE 1 TABLET TWICE DAILY FOR BLOOD PRESSURE 180 tablet 1     simvastatin (ZOCOR) 40 MG tablet TAKE 1 AND 1/2 TABLETS BY MOUTH EVERY  tablet 3     terazosin (HYTRIN) 10 MG capsule Take 1 capsule (10 mg total) by mouth at bedtime. 90 capsule 3     tiotropium (SPIRIVA WITH HANDIHALER) 18 mcg inhalation capsule INHALE CONTENTS OF 1 CAPSULE DAILY 30 capsule 5     traZODone (DESYREL) 50 MG tablet Take 1 tablet (50 mg total) by mouth at bedtime. As needed for sleep 90 tablet 0     metFORMIN (GLUCOPHAGE XR) 500 MG 24 hr tablet Take 2 tablets (1,000 mg total) by mouth daily with breakfast. 180 tablet 3     testosterone (ANDROGEL) 1.62 % (40.5 mg/2.5 gram) GlPk Apply 1 pck daily 75 g 5     No current facility-administered medications for this visit.        Past Medical History, Family History, and Social History reviewed.  Past Medical History:   Diagnosis Date     Aneurysm Of The Abdominal Aorta     Created by Conversion      Benign Prostatic Hypertrophy     Created by Conversion      Benign Prostatic Hypertrophy With Urinary Obstruction     Created by Conversion      Cataract     Created by Conversion      Cervicalgia     Created by Conversion      Chronic  Obstructive Pulmonary Disease     Created by Conversion      Diabetes mellitus, type II (H)      High cholesterol      Hyperglycemia     Created by Conversion      Hyperlipidemia     Created by Conversion      Hypertension     Created by Conversion      Hypoxia     Created by Conversion      Lower Back Sprain     Created by Conversion      Obstructive Sleep Apnea     Created by Conversion      Vitamin D Deficiency     Created by Conversion      Past Surgical History:   Procedure Laterality Date     FINGER SURGERY       Patient has no known allergies.  Family History   Problem Relation Age of Onset     Snoring Father      Social History     Socioeconomic History     Marital status:      Spouse name: Not on file     Number of children: Not on file     Years of education: Not on file     Highest education level: Not on file   Social Needs     Financial resource strain: Not on file     Food insecurity - worry: Not on file     Food insecurity - inability: Not on file     Transportation needs - medical: Not on file     Transportation needs - non-medical: Not on file   Occupational History     Not on file   Tobacco Use     Smoking status: Former Smoker     Packs/day: 0.25     Years: 54.00     Pack years: 13.50     Types: Cigarettes     Last attempt to quit: 10/18/2017     Years since quittin.3     Smokeless tobacco: Never Used   Substance and Sexual Activity     Alcohol use: Yes     Alcohol/week: 12.6 oz     Types: 21 Cans of beer per week     Comment: 21 beers per week     Drug use: No     Sexual activity: No     Partners: Female     Birth control/protection: None   Other Topics Concern     Not on file   Social History Narrative     Not on file         Review of systems is as stated in HPI, and the remainder of the 10 system review is otherwise negative.    Objective:     Vitals:    19 1439   BP: 102/60   Patient Site: Right Arm   Patient Position: Sitting   Cuff Size: Adult Large   Pulse: 92   Temp: 98.8   F (37.1  C)   TempSrc: Oral   SpO2: (!) 84%   Weight: (!) 236 lb 5 oz (107.2 kg)    Body mass index is 33.91 kg/m .        General appearance: alert, appears stated age and cooperative  Head: Normocephalic, without obvious abnormality, atraumatic  Eyes: negative  Ears: normal TM's and external ear canals both ears  Nose: Nares normal. Septum midline. Mucosa normal. No drainage or sinus tenderness.  Throat: lips, mucosa, and tongue normal; teeth and gums normal  Neck: no adenopathy  Lungs: clear to auscultation bilaterally  Heart: regular rate and rhythm, S1, S2 normal, no murmur, click, rub or gallop  Extremities: extremities normal, atraumatic, no cyanosis or edema    Results: Chest x-ray was performed in clinic.  This was personally reviewed.  It was negative    EKG was performed in clinic and personally reviewed.  It showed normal sinus rhythm with incomplete right bundle branch block.    This note has been dictated using voice recognition software. Any grammatical or context distortions are unintentional and inherent to the the software.

## 2021-06-24 NOTE — TELEPHONE ENCOUNTER
Fax received from [facility], they have started the Prior Authorization Process via Cover My Meds    CoverMyMeds Key: [id number]    Medication Name: Simvastatin    Insurance Plan: [employer] [employer] Part D  PBM:   Patient ID: not provided on fax    Please complete the PA process

## 2021-06-24 NOTE — TELEPHONE ENCOUNTER
Refill Approved    Rx renewed per Medication Renewal Policy. Medication was last renewed on 10/16/2017.    Wilson Monterroso, [facility] Connection Triage/Med Refill 2/20/2019     Requested Prescriptions   Pending Prescriptions Disp Refills     simvastatin (ZOCOR) 40 MG tablet [Pharmacy Med Name: SIMVASTATIN 40 MG Tablet] 135 tablet 3     Sig: TAKE 1 AND 1/2 TABLETS EVERY DAY    Statins Refill Protocol (Hmg CoA Reductase Inhibitors) Passed - 2/19/2019  8:21 PM       Passed - PCP or prescribing provider visit in past 12 months     Last office visit with prescriber/PCP: 2/13/2019 Bekah Castanon MD OR same dept: 2/13/2019 Bekah Castanon MD OR same specialty: 2/13/2019 Bekah Castanon MD  Last physical: Visit date not found Last MTM visit: Visit date not found   Next visit within 3 mo: Visit date not found  Next physical within 3 mo: Visit date not found  Prescriber OR PCP: Bekah Castanon MD  Last diagnosis associated with med order: 1. Hyperlipemia  - simvastatin (ZOCOR) 40 MG tablet [Pharmacy Med Name: SIMVASTATIN 40 MG Tablet]; TAKE 1 AND 1/2 TABLETS EVERY DAY  Dispense: 135 tablet; Refill: 3    If protocol passes may refill for 12 months if within 3 months of last provider visit (or a total of 15 months).

## 2021-06-24 NOTE — TELEPHONE ENCOUNTER
Refill Request  Did you contact pharmacy: No pharmacy is calling  Medication name:   Requested Prescriptions     Pending Prescriptions Disp Refills     gabapentin (NEURONTIN) 300 MG capsule 270 capsule 3     Sig: Take 1 capsule (300 mg total) by mouth 3 (three) times a day.     Who prescribed the medication: Lg  Pharmacy Name and Location: [facility]  Is patient out of medication: Yes  Patient notified refills processed in 72 hours:  yes  Okay to leave a detailed message: yes

## 2021-06-24 NOTE — TELEPHONE ENCOUNTER
[facility] representative calling, they can not authorize the MR Cardiac Stress Test MD Brian ordered since patient is asymptomatic for his 5.6cm AAA.  They can authorize a nuclear stress test or if he does want the MR Cardiac Stress Test they can send it to the [facility] medical director for a 2nd review.      A text was sent to the [facility] md team for order.  Writer will call [facility] with determination.  Call back #[phone]

## 2021-06-24 NOTE — PROGRESS NOTES
RESPIRATORY CARE NOTE     Patient Name: Raf Dalton  Today's Date: 3/6/2019     Complete PFT done. Pt performed tests with good effort. Test results meet ATS standards for acceptability and repeatability.  Pt very anxious and coughed frequently with testing. Results scanned into epic. Pt left in no distress.       Mary Yoon, SHOAIBT

## 2021-06-24 NOTE — TELEPHONE ENCOUNTER
Called [facility] again, LM with pt info and what supplies are needed for CPAP .  Pt notified we are waiting to hear back about the supplies. Pt states he spoke with [facility] earlier today and they will be sending him the supplies needed.

## 2021-06-24 NOTE — TELEPHONE ENCOUNTER
Refill Approved    Rx renewed per Medication Renewal Policy. Medication was last renewed on 10/22/18  Last office visit was 2/13/19.    Leia Medina, [facility] [place] Connection Triage/Med Refill 2/23/2019     Requested Prescriptions   Pending Prescriptions Disp Refills     gabapentin (NEURONTIN) 300 MG capsule 270 capsule 3     Sig: Take 1 capsule (300 mg total) by mouth 3 (three) times a day.    Gabapentin/Levetiracetam/Tiagabine Refill Protocol  Passed - 2/23/2019 12:03 PM       Passed - PCP or prescribing provider visit in past 12 months or next 3 months    Last office visit with prescriber/PCP: Visit date not found OR same dept: Visit date not found OR same specialty: Visit date not found  Last physical: Visit date not found Last MTM visit: Visit date not found   Next visit within 3 mo: Visit date not found  Next physical within 3 mo: Visit date not found  Prescriber OR PCP: Leia Medina RN  Last diagnosis associated with med order: 1. Back pain  - gabapentin (NEURONTIN) 300 MG capsule; Take 1 capsule (300 mg total) by mouth 3 (three) times a day.  Dispense: 270 capsule; Refill: 3    If protocol passes may refill for 12 months if within 3 months of last provider visit (or a total of 15 months).

## 2021-06-24 NOTE — TELEPHONE ENCOUNTER
Looks like PA was approved yesterday and the pharmacy is to reach out to the patient once his prescription is ready for .

## 2021-06-24 NOTE — PROGRESS NOTES
Assessment:    Symptoms of allergic rhinitis.  Also consider nonallergic vasomotor rhinitis.    Plan:    We will do specific IgE allergy testing for environmental allergy panel.  Regardless of whether allergies are identified, I would recommend fluticasone nasal spray 1 spray each nostril    Consider using a Sarai pot daily.  Described process.  ____________________________________________________________________________     Burning comes in today for allergy evaluation.  He reports chronic nasal congestion, rhinorrhea, sneezing.  He feels that his symptoms are year-round.  He has had these for years.  There is no obvious trigger.  He is not routinely use allergy medication.  Occasionally he will use his wife's over-the-counter nasal spray.  He is not sure what is in it.  He does have a history of asthma COPD overlap.  He has seen pulmonary for this.  He does not report recurrent sinus infections.  He does not report triggers such as smoke or perfume.    Review of symptoms:  As above, otherwise negative    Past medical history: Obstructive sleep apnea, hyperlipidemia, BPH, hypertension, chronic bronchitis, aneurysm of abdominal aorta, type 2 diabetes    Allergies: No known allergies to medications, latex, foods or hymenoptera venom    Family history: 3 sisters with asthma.  He is unaware of environmental allergy in the family.    Social history: Currently is lived in the same apartment for 5 years.  No visible water seepage or mold.  He has had a cat in the home for less than a year.  He said a dog for 8 years.  His wife does smoke indoors.  Patient was a previous smoker smoking off and on since he was a teenager.    Medications: reviewed in chart    Physical Exam:  General:  Alert and in no apparent distress.  Eyes:  Sclera clear.  Ears: TMs translucent grey with bony landmarks visible. Nose: Pale, boggy mucosal membranes.  Throat: Pink, moist.  No lesions.  Neck: Supple.  No lymphadenopathy.  Lungs: CTA.  CV:  Regular rate and rhythm. Extremities: Well perfused.  No clubbing or cyanosis. Skin: No rash

## 2021-06-24 NOTE — TELEPHONE ENCOUNTER
"Medication Question or Clarification  Who is calling: Patient  What medication are you calling about?: simvastatin (ZOCOR) 40 MG tablet  What dose do you take?: 60 mg  How often are you taking the medication?: Daily  Who prescribed the medication?: Bekah Castanon MD  What is your question/concern?: Patient calling to check on status. Let him know PA was initiated. Patient confused about PA and why its needed stating \"humza been on the medication for years\". Explained process to patient, explained it is something that can happen with the new year of insurance, etc.    Patient wondering if Bekah Castanon MD wants to change medication versus dealing with PA.    Patient does have 1-2 weeks of medication left just was calling to get this situated.    Please advise.  Pharmacy: [facility]  Okay to leave a detailed message?: Yes  Site CMT - Please call the pharmacy to obtain any additional needed information.    Patient gave verbal okay to speak with wife if patient is not available.   "

## 2021-06-24 NOTE — PROGRESS NOTES
VASCULAR SURGERY OUTPATIENT CONSULT OR VISIT   VASCULAR SURGEON: Marquita Brian MD    LOCATION:  [facility]    Raf Dalton   Medical Record #:  [id number]  YOB: 1950  Age:  68 y.o.     Date of Service: 2/13/2019    PRIMARY CARE PROVIDER: Bekah Castanon MD      Reason for consultation: Reevaluation of known abdominal aortic aneurysm    IMPRESSION: Abdominal aortic aneurysm now 5.6 cm in maximum diameter, and a threshold for operative intervention.  Infrarenal and amenable to both endovascular as well as open surgery.  Patient discloses concern over need for long-term surveillance with endovascular repair, but it is unclear whether he is healthy enough to recover well from open surgery.  His most recently having issues with hypoglycemia from his diabetes medicines.  Tells me that he has successfully quit smoking, but that his wife still smokes.  History of COPD that needs careful evaluation before open surgery is considered.  This also, however increases risk of endograft failure in the long run.  No recent cardiac workup either.    RECOMMENDATION: Patient with 5.6 cm abdominal aortic aneurysm with somewhat between a 5 and 10% annual risk of rupture.  In this context, and an independent living 68-year-old, open repair versus endovascular repair should be considered.  We will have him undergo MR stress test to evaluate his heart.  We will have her undergo PFTs and pulmonary consult to evaluate his ability to tolerate open surgery.  I will have him revisit with a primary care physician to try to optimize his diabetes medications and resolve his hypotension.  Return visit with me in 3 weeks or so at which time we will do a CTA with contrast to be able to properly plan for endovascular repair should this be the best option.    HPI:  Raf Dalton is a 68 y.o. male who was seen today in follow-up for his abdominal aortic aneurysm.  On his last visit his aneurysm was 5.3 cm in maximum  diameter and he had only just quit smoking.  In this context it made sense to continue to follow things until he met criteria for repair.  In the intervening time not much has happened.  He has no abdominal complaints.  The biggest issue is that he has been having episodes of hypotension and has been cutting back on his metformin dosing.  Even in clinic this morning blood pressure was 80/50 and it is very likely that he was hypoglycemic.    Had a long discussion about him now meeting criteria for aneurysm repair voiced a lot of concerns about the endovascular technology and need for long-term surveillance.  We discussed the pros and cons at length.    PHH:    Past Medical History:   Diagnosis Date     Aneurysm Of The Abdominal Aorta     Created by Conversion      Benign Prostatic Hypertrophy     Created by Conversion      Benign Prostatic Hypertrophy With Urinary Obstruction     Created by Conversion      Cataract     Created by Conversion      Cervicalgia     Created by Conversion      Chronic Obstructive Pulmonary Disease     Created by Conversion      Diabetes mellitus, type II (H)      High cholesterol      Hyperglycemia     Created by Conversion      Hyperlipidemia     Created by Conversion      Hypertension     Created by Conversion      Hypoxia     Created by Conversion      Lower Back Sprain     Created by Conversion      Obstructive Sleep Apnea     Created by Conversion      Vitamin D Deficiency     Created by Conversion         Past Surgical History:   Procedure Laterality Date     FINGER SURGERY         ALLERGIES:  Patient has no known allergies.    MEDS:    Current Outpatient Medications:      ACCU-CHEK TORI Misc, , Disp: , Rfl:      ACCU-CHEK SMARTVIEW TEST STRIP strips, TEST BLOOD SUGAR TWICE DAILY, Disp: 200 strip, Rfl: 3     albuterol (PROAIR HFA;PROVENTIL HFA;VENTOLIN HFA) 90 mcg/actuation inhaler, Inhale 2 puffs every 6 (six) hours as needed for wheezing., Disp: 1 each, Rfl: 6     aspirin 81 MG EC  tablet, Take 1 tablet (81 mg total) by mouth daily., Disp: , Rfl: 0     budesonide-formoterol (SYMBICORT) 80-4.5 mcg/actuation inhaler, Inhale 2 puffs 2 (two) times a day., Disp: 1 Inhaler, Rfl: 12     buPROPion (WELLBUTRIN SR) 150 MG 12 hr tablet, TAKE 1 TABLET BY MOUTH TWICE DAILY, Disp: 180 tablet, Rfl: 2     cholecalciferol, vitamin D3, (VITAMIN D3) 5,000 unit Tab, Take 1 tablet (5,000 Units total) by mouth daily., Disp: 90 tablet, Rfl: 3     gabapentin (NEURONTIN) 300 MG capsule, Take 1 capsule (300 mg total) by mouth 3 (three) times a day., Disp: 270 capsule, Rfl: 3     generic lancets (ACCU-CHEK FASTCLIX), USE TO TEST TWICE DAILY, Disp: 200 each, Rfl: 3     glipiZIDE (GLUCOTROL) 5 MG tablet, Take 1 tablet (5 mg total) by mouth 2 (two) times a day before meals. 1/2 Hour BEFORE meals, Disp: 60 tablet, Rfl: 11     HYDROcodone-acetaminophen (NORCO) 5-325 mg per tablet, Take 1 tablet by mouth every 8 (eight) hours as needed for pain., Disp: 15 tablet, Rfl: 0     lisinopril (PRINIVIL,ZESTRIL) 20 MG tablet, TAKE 1 TABLET TWICE DAILY FOR BLOOD PRESSURE, Disp: 180 tablet, Rfl: 1     simvastatin (ZOCOR) 40 MG tablet, TAKE 1 AND 1/2 TABLETS BY MOUTH EVERY DAY, Disp: 135 tablet, Rfl: 3     terazosin (HYTRIN) 10 MG capsule, Take 1 capsule (10 mg total) by mouth at bedtime., Disp: 90 capsule, Rfl: 3     tiotropium (SPIRIVA WITH HANDIHALER) 18 mcg inhalation capsule, INHALE CONTENTS OF 1 CAPSULE DAILY, Disp: 30 capsule, Rfl: 5     traZODone (DESYREL) 50 MG tablet, Take 1 tablet (50 mg total) by mouth at bedtime. As needed for sleep, Disp: 90 tablet, Rfl: 0     metFORMIN (GLUCOPHAGE XR) 500 MG 24 hr tablet, Take 2 tablets (1,000 mg total) by mouth 2 (two) times a day., Disp: 360 tablet, Rfl: 3     testosterone (ANDROGEL) 1.62 % (40.5 mg/2.5 gram) GlPk, Apply 1 pck daily, Disp: 75 g, Rfl: 5    SOCIAL HABITS:    Social History     Tobacco Use   Smoking Status Former Smoker     Packs/day: 0.25     Years: 54.00     Pack years:  13.50     Types: Cigarettes     Last attempt to quit: 10/18/2017     Years since quittin.3   Smokeless Tobacco Never Used       Social History     Substance and Sexual Activity   Alcohol Use Yes     Alcohol/week: 12.6 oz     Types: 21 Cans of beer per week    Comment: 21 beers per week       Social History     Substance and Sexual Activity   Drug Use No       FAMILY HISTORY:    Family History   Problem Relation Age of Onset     Snoring Father        REVIEW OF SYSTEMS:    A 12 point ROS was reviewed and except for what is listed in the HPI above, all others are negative    PE:  BP 90/50 (Patient Site: Left Arm)   Pulse 88   Temp 98.5  F (36.9  C)   Resp 16   Wt (!) 233 lb 14.4 oz (106.1 kg)   BMI 33.56 kg/m    Wt Readings from Last 1 Encounters:   19 (!) 233 lb 14.4 oz (106.1 kg)     Body mass index is 33.56 kg/m .    EXAM:  GENERAL: This is a well-developed 68 y.o. male who appears his stated age  EYES: Grossly normal.  MOUTH: Buccal mucosa normal   CARDIAC:  Not assessed  CHEST/LUNG:  Not Assessed  GASTROINTESINAL (ABDOMEN):Not Assessed   MUSCULOSKELETAL: Grossly normal and both lower extremities are intact.  HEME/LYMPH: No lymphedema  NEUROLOGIC: Focally intact, Alert and oriented x 3.   PSYCH: appropriate affect  INTEGUMENT: No open lesions or ulcers    DIAGNOSTIC STUDIES:     Images:  No results found.    I personally reviewed the images and my interpretation is that his noncontrast CT shows that he does not have a thoracic aortic aneurysm.  His abdominal aortic aneurysm is now 5.6 cm in maximum diameter.  It is infrarenal.    LABS:      Sodium   Date Value Ref Range Status   10/22/2018 140 136 - 145 mmol/L Final   2018 143 136 - 145 mmol/L Final   10/12/2017 143 136 - 145 mmol/L Final     Potassium   Date Value Ref Range Status   10/22/2018 4.5 3.5 - 5.0 mmol/L Final   2018 4.8 3.5 - 5.0 mmol/L Final   10/12/2017 4.9 3.5 - 5.0 mmol/L Final     Chloride   Date Value Ref Range Status    10/22/2018 101 98 - 107 mmol/L Final   03/23/2018 103 98 - 107 mmol/L Final   10/12/2017 102 98 - 107 mmol/L Final     BUN   Date Value Ref Range Status   10/22/2018 7 (L) 8 - 22 mg/dL Final   03/23/2018 8 8 - 22 mg/dL Final   10/12/2017 13 8 - 22 mg/dL Final     Creatinine   Date Value Ref Range Status   10/22/2018 0.97 0.70 - 1.30 mg/dL Final   03/23/2018 0.79 0.70 - 1.30 mg/dL Final   10/12/2017 1.11 0.70 - 1.30 mg/dL Final     Hemoglobin   Date Value Ref Range Status   10/22/2018 14.0 14.0 - 18.0 g/dL Final   07/26/2016 15.2 14.0 - 18.0 g/dL Final   04/01/2016 15.1 14.0 - 18.0 g/dL Final     Platelets   Date Value Ref Range Status   10/22/2018 194 140 - 440 thou/uL Final   07/26/2016 168 140 - 440 thou/uL Final   07/17/2012 170 140 - 440 thou/uL Final       Total time spent 25 minutes face to face with patient with more than 50% time spent in counseling and coordination of care.    Marquita Brian MD  VASCULAR SURGERY

## 2021-06-24 NOTE — TELEPHONE ENCOUNTER
Dr. Castanon did you or Francia leave a VM for this patient to call?  Sounds like he should schedule an appointment to discuss medications etc.       Please advise

## 2021-06-24 NOTE — PATIENT INSTRUCTIONS - HE
Assessment:    Symptoms of allergic rhinitis.  Also consider nonallergic vasomotor rhinitis.    Plan:    We will do specific IgE allergy testing for environmental allergy panel.  Regardless of whether allergies are identified, I would recommend fluticasone nasal spray 1 spray each nostril    Consider using a Sarai pot daily.

## 2021-06-24 NOTE — TELEPHONE ENCOUNTER
RN triage   Call from Kobe at  [facility]ascular [facility] -- pt there now   Pt BP = 80/60 - no symptoms -- had not eaten or taken any meds today before C-T   Kobe gave pt snack -- and recheck = 90/50 --   No symptoms --   T = 98.5  Drinking fluids OK -- and U.O. OK   Provider at vascular clinic wants pt seen   Per protocol = should be seen   Transferred to  for appt -- reviewed when to call back/ be seen sooner   Tiffany Augustine RN [facility] RN triage      Reason for Disposition    Systolic BP < 90 and NOT dizzy, lightheaded or weak    Protocols used: LOW BLOOD PRESSURE-A-OH

## 2021-06-24 NOTE — TELEPHONE ENCOUNTER
Its likely insurance only wants to cover 1 tablet of simvastatin per day.  Patient takes 60 mg of simvastatin daily.      Please see below message and advise  PA is still in process.

## 2021-06-24 NOTE — TELEPHONE ENCOUNTER
Patient called in wondering what kind of prep he needed for stress test. Writer told him to hold caffeine for 24hrs and npo 4hrs before the test.

## 2021-06-24 NOTE — TELEPHONE ENCOUNTER
[facility] MR cardiac stress called to verify what test Raf is to be having. They saw the note from Dinorah Martin RN from 3/7/19 that stated that patient's insurance would not cover MR cardiac stress test but would cover NM pharmacologic stress test. At that time NM pharmacologic stress test was ordered. Patient does not have this test scheduled, but does have an appointment for MR cardiac stress test today.    Called and spoke with [facility] and they will be calling to get test scheduled Let them know I would call Raf and let him know he should not go there today at 12:30.    Spoke with Raf and he is understanding he should not go in for testing today.

## 2021-06-24 NOTE — TELEPHONE ENCOUNTER
Pt notified call was placed to [facility] for his CPAP supplies ([phone]) . Will advise pt once orders come through and have been signed and faxed back to [facility].   Pt is requesting:  Mask  Tubing  Headgear  [facility]

## 2021-06-24 NOTE — PROGRESS NOTES
CCx:Pre-op evaluation prior to a AAA repair    HPI:Mr. Dalton is a 68-year-old gentleman with a past medical history significant for moderate obesity, obstructive sleep apnea, HTN, low back pain, hypercholesterolemia, hyperglycemia, type 2 diabetes, BPH and asthma/COPD who presents to clinic for the aforementioned chief complaint.  He was last seen by Dr. Caballero in our clinic in March 2017 time he had describe that his dyspnea was worsening in the setting of his wife smoking in the house and exertion;  He had also noted compliance with his CPAP machine at the time.  At the time he had been using Symbicort and Spiriva.  He recently presented to the emergency room with hypoxia and was noted to have an oxygen saturation of 84% in the clinic.  He had a benign exam and was administered 2 L of supplemental oxygen with significant improvement in his saturations because of which he was discharged to follow-up with us as an outpatient.  He is presently compliant with his Symbicort and Spiriva inhalers and states that he only uses his albuterol inhaler sparingly. He continues to be compliant with his CPAP at night and bleeds supplemental oxygen through this. He is due to undergo a AAA repair and was due to undergo a NM stress test and a cardiac MRI earlier this week. Unfortunately, the MRI machine was not working and this test has been rescheduled.  He specifically denies fevers, chills, night sweats, chest pain, wheezing or abdominal pain.    ROS:  Pertinent positives alluded to in the HPI. Remainder of 10 point ROS is negative.       PMH:  1. Morbid obesity  2. JOSE  3. HTN  4. Low back pain  5. Hypercholesterolemia  6. Hyperglycemia  7. Type 2 diabetes  8. BPH  9. Asthma/COPD overlap  10. Hypoxia     Allergies:  Reviewed in Epic.    Family HX:  Family History   Problem Relation Age of Onset     Snoring Father        Social Hx:  Social History     Socioeconomic History     Marital status:      Spouse name: None      Number of children: None     Years of education: None     Highest education level: None   Occupational History     None   Social Needs     Financial resource strain: None     Food insecurity:     Worry: None     Inability: None     Transportation needs:     Medical: None     Non-medical: None   Tobacco Use     Smoking status: Former Smoker     Packs/day: 1.50     Years: 55.00     Pack years: 82.50     Types: Cigarettes     Last attempt to quit: 10/18/2017     Years since quittin.3     Smokeless tobacco: Never Used   Substance and Sexual Activity     Alcohol use: Yes     Alcohol/week: 12.6 oz     Types: 21 Cans of beer per week     Comment: 21 beers per week     Drug use: No     Sexual activity: No     Partners: Female     Birth control/protection: None   Lifestyle     Physical activity:     Days per week: None     Minutes per session: None     Stress: None   Relationships     Social connections:     Talks on phone: None     Gets together: None     Attends Baptism service: None     Active member of club or organization: None     Attends meetings of clubs or organizations: None     Relationship status: None     Intimate partner violence:     Fear of current or ex partner: None     Emotionally abused: None     Physically abused: None     Forced sexual activity: None   Other Topics Concern     None   Social History Narrative     None     Current Meds:  Current Outpatient Medications   Medication Sig Dispense Refill     ACCU-CHEK TORI Misc        ACCU-CHEK SMARTVIEW TEST STRIP strips TEST BLOOD SUGAR TWICE DAILY 200 strip 3     albuterol (PROAIR HFA;PROVENTIL HFA;VENTOLIN HFA) 90 mcg/actuation inhaler Inhale 2 puffs every 6 (six) hours as needed for wheezing. 1 each 6     albuterol (PROVENTIL) 2.5 mg /3 mL (0.083 %) nebulizer solution Take 3 mL (2.5 mg total) by nebulization every 6 (six) hours as needed for wheezing or shortness of breath. 25 vial 2     aspirin 81 MG EC tablet Take 1 tablet (81 mg total) by mouth  daily.  0     budesonide-formoterol (SYMBICORT) 80-4.5 mcg/actuation inhaler Inhale 2 puffs 2 (two) times a day. 1 Inhaler 12     buPROPion (WELLBUTRIN SR) 150 MG 12 hr tablet TAKE 1 TABLET BY MOUTH TWICE DAILY 180 tablet 2     cholecalciferol, vitamin D3, (VITAMIN D3) 5,000 unit Tab Take 1 tablet (5,000 Units total) by mouth daily. 90 tablet 3     gabapentin (NEURONTIN) 300 MG capsule Take 1 capsule (300 mg total) by mouth 3 (three) times a day. 270 capsule 3     generic lancets (ACCU-CHEK FASTCLIX) USE TO TEST TWICE DAILY 200 each 3     lisinopril (PRINIVIL,ZESTRIL) 20 MG tablet TAKE 1 TABLET TWICE DAILY FOR BLOOD PRESSURE 180 tablet 1     metFORMIN (GLUCOPHAGE XR) 500 MG 24 hr tablet Take 2 tablets (1,000 mg total) by mouth daily with breakfast. 180 tablet 3     simvastatin (ZOCOR) 40 MG tablet TAKE 1 AND 1/2 TABLETS EVERY  tablet 3     terazosin (HYTRIN) 10 MG capsule Take 1 capsule (10 mg total) by mouth at bedtime. 90 capsule 3     tiotropium (SPIRIVA WITH HANDIHALER) 18 mcg inhalation capsule INHALE CONTENTS OF 1 CAPSULE DAILY 30 capsule 5     traZODone (DESYREL) 50 MG tablet Take 1 tablet (50 mg total) by mouth at bedtime. As needed for sleep 90 tablet 0     testosterone (ANDROGEL) 1.62 % (40.5 mg/2.5 gram) GlPk Apply 1 pck daily 75 g 5     No current facility-administered medications for this visit.      Labs:  Current Outpatient Medications   Medication Sig Dispense Refill     ACCU-CHEK TORI Misc        ACCU-CHEK SMARTVIEW TEST STRIP strips TEST BLOOD SUGAR TWICE DAILY 200 strip 3     albuterol (PROAIR HFA;PROVENTIL HFA;VENTOLIN HFA) 90 mcg/actuation inhaler Inhale 2 puffs every 6 (six) hours as needed for wheezing. 1 each 6     albuterol (PROVENTIL) 2.5 mg /3 mL (0.083 %) nebulizer solution Take 3 mL (2.5 mg total) by nebulization every 6 (six) hours as needed for wheezing or shortness of breath. 25 vial 2     aspirin 81 MG EC tablet Take 1 tablet (81 mg total) by mouth daily.  0      budesonide-formoterol (SYMBICORT) 80-4.5 mcg/actuation inhaler Inhale 2 puffs 2 (two) times a day. 1 Inhaler 12     buPROPion (WELLBUTRIN SR) 150 MG 12 hr tablet TAKE 1 TABLET BY MOUTH TWICE DAILY 180 tablet 2     cholecalciferol, vitamin D3, (VITAMIN D3) 5,000 unit Tab Take 1 tablet (5,000 Units total) by mouth daily. 90 tablet 3     gabapentin (NEURONTIN) 300 MG capsule Take 1 capsule (300 mg total) by mouth 3 (three) times a day. 270 capsule 3     generic lancets (ACCU-CHEK FASTCLIX) USE TO TEST TWICE DAILY 200 each 3     lisinopril (PRINIVIL,ZESTRIL) 20 MG tablet TAKE 1 TABLET TWICE DAILY FOR BLOOD PRESSURE 180 tablet 1     metFORMIN (GLUCOPHAGE XR) 500 MG 24 hr tablet Take 2 tablets (1,000 mg total) by mouth daily with breakfast. 180 tablet 3     simvastatin (ZOCOR) 40 MG tablet TAKE 1 AND 1/2 TABLETS EVERY  tablet 3     terazosin (HYTRIN) 10 MG capsule Take 1 capsule (10 mg total) by mouth at bedtime. 90 capsule 3     tiotropium (SPIRIVA WITH HANDIHALER) 18 mcg inhalation capsule INHALE CONTENTS OF 1 CAPSULE DAILY 30 capsule 5     traZODone (DESYREL) 50 MG tablet Take 1 tablet (50 mg total) by mouth at bedtime. As needed for sleep 90 tablet 0     testosterone (ANDROGEL) 1.62 % (40.5 mg/2.5 gram) GlPk Apply 1 pck daily 75 g 5     No current facility-administered medications for this visit.      Imaging studies:  CT CAP 8/8/18:  1.  Infrarenal abdominal aortic aneurysm measuring 5 cm in maximal dimension. No significant change in comparison to previous study. Proximal neck measures 2.5 cm in diameter with an approximate length of 2.9 cm. 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   2.  Right calcified granuloma with associated right hilar calcified nodes. Findings consistent with old granulomatous disease.  3.  Ectasia of the ascending thoracic aorta measuring 3.9 cm.    PFT's 3/6/19:  FEV1/FVC is 56% and is reduced.  FEV1 is 1.53L (47%) predicted  "and is reduced.  FVC is 2.72L (63%) predicted and reduced.  There was improvement in spirometry after a single inhaled dose of bronchodilator.  TLC is 7.93L (111%) predicted and is normal.  RV is 4.81L (182%) predicted and is increased.  DLCO is 23.53ml/min/hg (98%) predicted and is normal when it is corrected for hemoglobin.  Flow volume loops indicate scooping of the expiratory limb.    Impression:  Full Pulmonary Function Test is abnormal.  PFTs are consistent with severe obstructive disease.  Spirometry is consistent with reversibility.  There is no hyperinflation.  There is air-trapping.  Diffusion capacity when corrected for hemoglobin is normal.     Physical Exam:  /82   Pulse 74   Ht 5' 10\" (1.778 m)   Wt (!) 236 lb 5 oz (107.2 kg)   SpO2 94%   BMI 33.91 kg/m    No Data Recorded  Physical Exam   Constitutional: He is oriented to person, place, and time. He appears well-developed and well-nourished.   Very pleasant gentleman.   HENT:   Head: Normocephalic and atraumatic.   Eyes: Pupils are equal, round, and reactive to light.   Neck: Normal range of motion.   Cardiovascular: Normal rate and regular rhythm.   Pulmonary/Chest: Effort normal and breath sounds normal.   Abdominal: Soft.   Musculoskeletal: Normal range of motion. He exhibits no edema.   Neurological: He is alert and oriented to person, place, and time.   Skin: Skin is warm.   Psychiatric: He has a normal mood and affect.         Assessment and Plan:Raf Dalton is a 68 y.o. with a past medical history significant for moderate obesity, obstructive sleep apnea, HTN, low back pain, hypercholesterolemia, hyperglycemia, type 2 diabetes, BPH and asthma/COPD who presents to clinic today for a pre-op evaluation prior to a AAA. For the present we would recommend;    1. Asthma/COPD:Is noted to have severe obstruction on his spirometry with reversibility, air trapping and no evidence of diffusion limitation. His symptoms seems to be well " controlled on his current regimen on bronchodilators.    Continue Symbicort two times a day; was reminded to gargle after using this.    Continue Spiriva daily.    As needed albuterol for rescue.  2. Pre-operative Evaluation: Given that he is having a AAA repair, has a history of COPD and is aged between 60-69y, he has a class 4 risk, which translates to a 10.1% risk of respiratory failure post operatively.  3. Follow-up:6 months with Dr. Caballero.      Zoe WHITT [facility]  Pulmonary and Critical Care  [id number]

## 2021-06-24 NOTE — TELEPHONE ENCOUNTER
Pt came in today for a lab only. He wanted to see if you guys could please put in a request for a new hose, mask and tray for his cpap machine through [facility]. He would like to get a call from you Francia because he would like to talk to you.   Thanks

## 2021-06-24 NOTE — PROGRESS NOTES
CT BEFORE APPT @ 915 AM in [facility]- 6 month f/u. 5.3 cm abdominal aortic aneurysm that is asymptomatic. No pain.  HTN, DM2. Meds, ASA, statin.    Vitals:    02/13/19 0946   BP: (!) 82/60   Pulse: 88   Resp: 16   Temp: 98.5  F (36.9  C)     Patient did not take meds or have breakfast this morning due to CTA this morning. Asymptomatic at the moment. He thinks he may have low bp due to hypoglycemia. gave him a snack. Notified Dr. Brian.     BP recheck: 90/50. Patient states he is feeling better after the snack. Notified his primary clinic which then forwarded writer to triage. Spoke to triage nurse, Tiffany. Will have patient go in to see Dr. Castanon today at 2:40pm.

## 2021-06-24 NOTE — TELEPHONE ENCOUNTER
Patient Returning Call  Reason for call:  Patient returning call  Information relayed to patient: patient received VM to call clinic . Pt is coming in today for labs,( Pt was sent to ED by ambulance from clinic yesterday)   Patient has additional questions:  Yes  If YES, what are your questions/concerns:  Pt requesting to speak to MD or nurse about his medications & his labs that he was supposed to get drawn yesterday..    Okay to leave a detailed message?:  Yes

## 2021-06-25 NOTE — PROGRESS NOTES
VASCULAR SURGERY OUTPATIENT CONSULT OR VISIT   VASCULAR SURGEON: Marquita Brian MD    LOCATION:  [facility]    Raf Dalton   Medical Record #:  [id number]  YOB: 1950  Age:  68 y.o.     Date of Service: 3/21/2019    PRIMARY CARE PROVIDER: Bekah Castanon MD      Reason for consultation: Follow-up of abdominal aortic aneurysm    IMPRESSION: 5.8 cm abdominal aortic aneurysm that is infrarenal in a good candidate for endovascular repair.  Seen by his pulmonologist who feels that he has at least a 10% risk of major pulmonary complication with intubation and major surgery.  Small area of reversible ischemia on his nuclear stress test. in this context we will try to avoid general anesthesia.    RECOMMENDATION: Tentative plan for endovascular aneurysm repair.  Will use Medtronic device and possibly consider Aptus endo-fixation to increase durability.  Patient supportive of going forward with local anesthesia and sedation.    HPI:  Raf Dalton is a 68 y.o. male who was seen today in follow-up for his abdominal aortic aneurysm.  After I saw him we sent him for stress testing which showed a small area of reversible ischemia.  We also sent in for evaluation of his COPD and he was recognized to have important risk from general anesthesia.  Patient quit smoking in 2017.    This point patient only wants endovascular repair which is appropriate.  We spent some time discussing the surgery as well as the risk of endoleak and need for long-term surveillance and follow-up.    PHH:    Past Medical History:   Diagnosis Date     Aneurysm Of The Abdominal Aorta     Created by Conversion      Benign Prostatic Hypertrophy     Created by Conversion      Benign Prostatic Hypertrophy With Urinary Obstruction     Created by Conversion      Cataract     Created by Conversion      Cervicalgia     Created by Conversion      Chronic Obstructive Pulmonary Disease     Created by Conversion      Diabetes mellitus, type  II (H)      High cholesterol      Hyperglycemia     Created by Conversion      Hyperlipidemia     Created by Conversion      Hypertension     Created by Conversion      Hypoxia     Created by Conversion      Lower Back Sprain     Created by Conversion      Obstructive Sleep Apnea     Created by Conversion      Vitamin D Deficiency     Created by Conversion         Past Surgical History:   Procedure Laterality Date     FINGER SURGERY         ALLERGIES:  Patient has no known allergies.    MEDS:    Current Outpatient Medications:      ACCU-CHEK TORI Misc, , Disp: , Rfl:      ACCU-CHEK SMARTVIEW TEST STRIP strips, TEST BLOOD SUGAR TWICE DAILY, Disp: 200 strip, Rfl: 3     albuterol (PROAIR HFA;PROVENTIL HFA;VENTOLIN HFA) 90 mcg/actuation inhaler, Inhale 2 puffs every 6 (six) hours as needed for wheezing., Disp: 1 each, Rfl: 6     albuterol (PROVENTIL) 2.5 mg /3 mL (0.083 %) nebulizer solution, Take 3 mL (2.5 mg total) by nebulization every 6 (six) hours as needed for wheezing or shortness of breath., Disp: 25 vial, Rfl: 2     aspirin 81 MG EC tablet, Take 1 tablet (81 mg total) by mouth daily., Disp: , Rfl: 0     budesonide-formoterol (SYMBICORT) 80-4.5 mcg/actuation inhaler, Inhale 2 puffs 2 (two) times a day., Disp: 1 Inhaler, Rfl: 12     buPROPion (WELLBUTRIN SR) 150 MG 12 hr tablet, TAKE 1 TABLET BY MOUTH TWICE DAILY, Disp: 180 tablet, Rfl: 2     cholecalciferol, vitamin D3, (VITAMIN D3) 5,000 unit Tab, Take 1 tablet (5,000 Units total) by mouth daily., Disp: 90 tablet, Rfl: 3     fluticasone (FLONASE) 50 mcg/actuation nasal spray, 1 spray into each nostril 2 (two) times a day., Disp: 16 g, Rfl: 11     gabapentin (NEURONTIN) 300 MG capsule, Take 1 capsule (300 mg total) by mouth 3 (three) times a day., Disp: 270 capsule, Rfl: 3     generic lancets (ACCU-CHEK FASTCLIX), USE TO TEST TWICE DAILY, Disp: 200 each, Rfl: 3     lisinopril (PRINIVIL,ZESTRIL) 20 MG tablet, TAKE 1 TABLET TWICE DAILY FOR BLOOD PRESSURE, Disp:  "180 tablet, Rfl: 1     metFORMIN (GLUCOPHAGE XR) 500 MG 24 hr tablet, Take 2 tablets (1,000 mg total) by mouth daily with breakfast., Disp: 180 tablet, Rfl: 3     simvastatin (ZOCOR) 40 MG tablet, TAKE 1 AND 1/2 TABLETS EVERY DAY, Disp: 135 tablet, Rfl: 3     terazosin (HYTRIN) 10 MG capsule, Take 1 capsule (10 mg total) by mouth at bedtime., Disp: 90 capsule, Rfl: 3     tiotropium (SPIRIVA WITH HANDIHALER) 18 mcg inhalation capsule, INHALE CONTENTS OF 1 CAPSULE DAILY, Disp: 30 capsule, Rfl: 5     traZODone (DESYREL) 50 MG tablet, Take 1 tablet (50 mg total) by mouth at bedtime. As needed for sleep, Disp: 90 tablet, Rfl: 0     testosterone (ANDROGEL) 1.62 % (40.5 mg/2.5 gram) GlPk, Apply 1 pck daily, Disp: 75 g, Rfl: 5    SOCIAL HABITS:    Social History     Tobacco Use   Smoking Status Former Smoker     Packs/day: 1.50     Years: 55.00     Pack years: 82.50     Types: Cigarettes     Last attempt to quit: 10/18/2017     Years since quittin.4   Smokeless Tobacco Never Used       Social History     Substance and Sexual Activity   Alcohol Use Yes     Alcohol/week: 12.6 oz     Types: 21 Cans of beer per week    Comment: 21 beers per week       Social History     Substance and Sexual Activity   Drug Use No       FAMILY HISTORY:    Family History   Problem Relation Age of Onset     Snoring Father        REVIEW OF SYSTEMS:    A 12 point ROS was reviewed and except for what is listed in the HPI above, all others are negative    PE:  /72 (Patient Site: Right Arm, Patient Position: Sitting, Cuff Size: Adult Regular)   Pulse 80   Temp 98.3  F (36.8  C) (Oral)   Resp 18   Ht 5' 10\" (1.778 m)   Wt (!) 236 lb (107 kg)   BMI 33.86 kg/m    Wt Readings from Last 1 Encounters:   19 (!) 236 lb (107 kg)     Body mass index is 33.86 kg/m .    EXAM:  GENERAL: This is a well-developed 68 y.o. male who appears his stated age  EYES: Grossly normal.  MOUTH: Buccal mucosa normal   CARDIAC:  Not assessed  CHEST/LUNG:  Not " Assessed  GASTROINTESINAL (ABDOMEN):Not Assessed   MUSCULOSKELETAL: Grossly normal and both lower extremities are intact.  HEME/LYMPH: No lymphedema  NEUROLOGIC: Focally intact, Alert and oriented x 3.   PSYCH: appropriate affect  INTEGUMENT: No open lesions or ulcers          DIAGNOSTIC STUDIES:     Images:  Cta Abdomen Pelvis    Result Date: 3/20/2019  EXAM DATE:         03/20/2019 EXAM: CT ANGIO ABDOMEN and PELVIS WITH or W/O and WITH CONTRAST LOCATION: [facility] DATE/TIME: 3/20/2019 12:45 PM INDICATION: Abdominal aortic aneurysm COMPARISON: 2/13/2019. TECHNIQUE: Helical acquisition through the abdomen and pelvis was performed during the arterial phase of contrast enhancement. 2D and 3D reconstructions were performed by the CT technologist. Dose reduction techniques were used. IV CONTRAST: 100ml IV Isovue 370 administered from a single use vial. SPR I-Stat Cr=0.9mg/dl, eGFR=84. FINDINGS: ANGIOGRAM ABDOMEN/PELVIS: There is an infrarenal abdominal aortic aneurysm beginning 2.9 cm distal to the left renal artery. Of note, there is an accessory right renal artery originating from the aneurysm itself supplying the lower pole of the right kidney. The aneurysm maximally measures 5.7 x 5.5 cm a mild to moderate amount of mural thrombus. Previously, the aneurysm measured 5.4 x 5.5 cm. 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 Mild dilation of the left common iliac artery measuring 1.9 cm. The internal iliac arteries are patent bilaterally. The external iliac arteries are patent bilaterally with normal calibers. Visualized lower extremity arteries appear patent. There is occlusion of the FREDDY origin with reconstitution proximally via the arc of Riolan. The remaining visceral arteries are patent without focal abnormality is visualized. LUNG BASES: Emphysema. Significant respiratory motion artifact. Mild bibasilar atelectasis. No pleural effusion or pneumothorax. No significant  cardiomegaly. ABDOMEN: Suboptimal evaluation the solid organs due to the arterial phase of contrast enhancement. The gallbladder, liver, spleen, right adrenal gland, left kidney, and pancreas are grossly unremarkable. There is a delayed nephrogram involving the lower pole of the right kidney with renal cortical thinning in this area. Small duodenal lipoma in the fourth portion of the duodenum. Small nodule involving the martha of the left adrenal gland is unchanged from the prior study. This nodule measures 1.4 x 1.1 cm. PELVIS: No abnormally dilated loops of bowel. No free fluid or free air. Diverticulosis. MUSCULOSKELETAL: Degenerative disease of the lower lumbar spine and hips bilaterally. CONCLUSION: 1.  Infrarenal abdominal aortic aneurysm maximally measuring 5.7 x 5.5 cm in diameter. Previously, the aneurysm measured 5.4 x 5.5 cm on 2/13/2019. The aneurysm begins 2.9 cm distal to the left main renal artery. Of note, there is an accessory lower right renal artery arising directly from the aneurysm from an area of significant mural thrombus. There is chronically decreased flow within this vessel leading to a delayed nephrogram of the lower pole the right kidney and renal cortical thinning within the area. 2.  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 3.  Emphysema. 4.  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 5.  Diverticulosis. DICOM format image data is available to non-affiliated external healthcare facilities or entities on a secure, media-free, reciprocally searchable basis with patient authorization for at least a 12-month period after the study.     Nm Pharmacologic Stress Test    Result Date: 3/18/2019    The exercise nuclear stress test is abnormal.   Stress nuclear images demonstrate a small area of mild distal anteroseptal ischemia.   The left  ventricular ejection fraction is 65% without wall motion abnormality.   When compared to the images of 11/2/2017, a small area of distal anteroseptal ischemia is new.   The patient is at a low risk of future cardiac ischemic events.        I personally reviewed the images and my interpretation is that his CTA demonstrates an infrarenal aortic aneurysm with adequate anatomy for endovascular repair.    LABS:      Sodium   Date Value Ref Range Status   02/14/2019 138 136 - 145 mmol/L Final   10/22/2018 140 136 - 145 mmol/L Final   03/23/2018 143 136 - 145 mmol/L Final     Potassium   Date Value Ref Range Status   02/14/2019 4.2 3.5 - 5.0 mmol/L Final   10/22/2018 4.5 3.5 - 5.0 mmol/L Final   03/23/2018 4.8 3.5 - 5.0 mmol/L Final     Chloride   Date Value Ref Range Status   02/14/2019 102 98 - 107 mmol/L Final   10/22/2018 101 98 - 107 mmol/L Final   03/23/2018 103 98 - 107 mmol/L Final     BUN   Date Value Ref Range Status   02/14/2019 10 8 - 22 mg/dL Final   10/22/2018 7 (L) 8 - 22 mg/dL Final   03/23/2018 8 8 - 22 mg/dL Final     Creatinine   Date Value Ref Range Status   02/14/2019 0.94 0.70 - 1.30 mg/dL Final   10/22/2018 0.97 0.70 - 1.30 mg/dL Final   03/23/2018 0.79 0.70 - 1.30 mg/dL Final     Hemoglobin   Date Value Ref Range Status   02/14/2019 15.4 14.0 - 18.0 g/dL Final   10/22/2018 14.0 14.0 - 18.0 g/dL Final   07/26/2016 15.2 14.0 - 18.0 g/dL Final     Platelets   Date Value Ref Range Status   02/14/2019 153 140 - 440 thou/uL Final   10/22/2018 194 140 - 440 thou/uL Final   07/26/2016 168 140 - 440 thou/uL Final       Total time spent 25 minutes face to face with patient with more than 50% time spent in counseling and coordination of care.    Marquita Brian MD  VASCULAR SURGERY

## 2021-06-25 NOTE — PROGRESS NOTES
1 month f/u. Here to discuss AAA surgery. Review NM stress from 3/11 and PFT (3/6). Consult with Pulm 3/12/19. Had an appt. with Lg 2/14 for low BP and blood sugar. EVAR

## 2021-06-25 NOTE — TELEPHONE ENCOUNTER
Dr. Rivero    Vernon called and said that he would like to get his test results. Would you be able to give him a call at [phone]? Thank you.

## 2021-06-27 NOTE — PROGRESS NOTES
Progress Notes by Naomi Villalpando MD at 4/3/2019 10:20 AM     Author: Naomi Villalpando MD Service: -- Author Type: Physician    Filed: 4/3/2019 11:42 AM Encounter Date: 4/3/2019 Status: Signed    : Naomi Villalpando MD (Physician)           Click to link to [facility] HEART CARE NOTE    Thank you, Dr. Castanon, for asking us to see Raf Dalton at the [facility].      Assessment/Recommendations   Assessment:    1.  Abnormal Lexiscan nuclear stress test: Suspect may be a false positive given abnormal stress testing done in 2012 with nonobstructive coronary artery disease with only mild plaque detected on coronary angiography.  Patient has no anginal complaints and stress test is low risk.  No need for any further cardiac evaluation prior to planned procedure on Tuesday.  2.  Abdominal aortic aneurysm: With plans for endovascular repair scheduled for next Tuesday by  at [facility].  3.  Hypertension: Well-controlled today  4.  COPD seen by pulmonary for preoperative evaluation  5.  JOSE compliant with CPAP  6.  Quit smoking in 2017    Plan:  1.  No anginal complaints and lower stress testing with nonobstructive coronary artery disease on angiogram 2012.  With this may proceed with surgery as planned for next Tuesday without further cardiac evaluation.  2.  Blood pressure is well controlled today and no further medication changes needed at this time.  3.  May follow up with me in 6 months       History of Present Illness    Mr. Raf Dalton is a 68 y.o. male with history of COPD, JOSE on CPAP, heavy smoking history quit in 2017, hypertension, diabetes mellitus type 2 and abdominal aortic aneurysm with plans for endovascular repair by  next Tuesday at [facility].  He is here today in rapid access clinic for preoperative cardiac evaluation.  Twelve-lead EKG today shows sinus rhythm at 78 with sinus arrhythmia and  incomplete right bundle branch block no significant change compared to prior.  He has history of an abnormal stress test back in 2012 with a medium size area of ischemia in the inferior wall and he underwent a coronary angiogram following this.  This showed just mild nonobstructive coronary artery disease.  Prior to the surgery on Tuesday he was scheduled for a repeat stress test.  He had a Lexiscan nuclear stress test that showed a small area of mild distal anteroseptal ischemia and left ventricular ejection fraction of 65% without wall motion abnormality.  Patient denies any chest discomfort.  He has chronic dyspnea on exertion that has remained unchanged.  No edema orthopnea.  No palpitations or lightheadedness.       Physical Examination Review of Systems   Vitals:    04/03/19 1020   BP: 130/76   Pulse: 72   Resp: 17     Body mass index is 35.36 kg/m .  Wt Readings from Last 3 Encounters:   04/03/19 (!) 236 lb (107 kg)   04/01/19 (!) 232 lb 7 oz (105.4 kg)   03/21/19 (!) 236 lb (107 kg)       General Appearance:   alert, no apparent distress   HEENT:  no scleral icterus; the mucous membranes are pink and moist                                  Neck: jugular venous pressure normal, no thyromegaly   Chest: the spine is straight and the chest is symmetric   Lungs:   respirations unlabored; the lungs are clear to auscultation   Cardiovascular:   regular rhythm with normal first and second heart sounds and no murmurs or gallops;  there are no carotid  bruits.   Abdomen:  no organomegaly, masses, bruits, or tenderness; bowel sounds are present   Extremities: no edema   Skin: no xanthelasma    General: Night Sweats, Weight Loss  Eyes: WNL  Ears/Nose/Throat: WNL  Lungs: Cough, Shortness of Breath, Wheezing  Heart: Shortness of Breath with activity  Stomach: WNL  Bladder: WNL  Muscle/Joints: WNL  Skin: WNL  Nervous System: Daytime Sleepiness, Loss of Balance, Dizziness  Mental Health: WNL     Blood: WNL     Medical  History  Surgical History Family History Social History   Past Medical History:   Diagnosis Date   ? Aneurysm Of The Abdominal Aorta     Created by Conversion    ? Benign Prostatic Hypertrophy     Created by Conversion    ? Benign Prostatic Hypertrophy With Urinary Obstruction     Created by Conversion    ? Cataract     Created by Conversion    ? Cervicalgia     Created by Conversion    ? Chronic Obstructive Pulmonary Disease     Created by Conversion    ? Diabetes mellitus, type II (H)    ? High cholesterol    ? Hyperglycemia     Created by Conversion    ? Hyperlipidemia     Created by Conversion    ? Hypertension     Created by Conversion    ? Hypoxia     Created by Conversion    ? Lower Back Sprain     Created by Conversion    ? Obstructive Sleep Apnea     Created by Conversion    ? Vitamin D Deficiency     Created by Conversion     Past Surgical History:   Procedure Laterality Date   ? FINGER SURGERY      Family History   Problem Relation Age of Onset   ? Snoring Father     Social History     Socioeconomic History   ? Marital status:      Spouse name: Not on file   ? Number of children: Not on file   ? Years of education: Not on file   ? Highest education level: Not on file   Occupational History   ? Not on file   Social Needs   ? Financial resource strain: Not on file   ? Food insecurity:     Worry: Not on file     Inability: Not on file   ? Transportation needs:     Medical: Not on file     Non-medical: Not on file   Tobacco Use   ? Smoking status: Former Smoker     Packs/day: 1.50     Years: 55.00     Pack years: 82.50     Types: Cigarettes     Last attempt to quit: 10/18/2017     Years since quittin.4   ? Smokeless tobacco: Never Used   Substance and Sexual Activity   ? Alcohol use: Yes     Alcohol/week: 12.6 oz     Types: 21 Cans of beer per week     Comment: 21 beers per week   ? Drug use: No   ? Sexual activity: No     Partners: Female     Birth control/protection: None   Lifestyle   ? Physical  activity:     Days per week: Not on file     Minutes per session: Not on file   ? Stress: Not on file   Relationships   ? Social connections:     Talks on phone: Not on file     Gets together: Not on file     Attends Anabaptist service: Not on file     Active member of club or organization: Not on file     Attends meetings of clubs or organizations: Not on file     Relationship status: Not on file   ? Intimate partner violence:     Fear of current or ex partner: Not on file     Emotionally abused: Not on file     Physically abused: Not on file     Forced sexual activity: Not on file   Other Topics Concern   ? Not on file   Social History Narrative   ? Not on file          Medications  Allergies   Current Outpatient Medications   Medication Sig Dispense Refill   ? ACCU-CHEK TORI Misc      ? ACCU-CHEK SMARTVIEW TEST STRIP strips TEST BLOOD SUGAR TWICE DAILY 200 strip 3   ? albuterol (PROAIR HFA;PROVENTIL HFA;VENTOLIN HFA) 90 mcg/actuation inhaler Inhale 2 puffs every 6 (six) hours as needed for wheezing. 1 each 6   ? albuterol (PROVENTIL) 2.5 mg /3 mL (0.083 %) nebulizer solution Take 3 mL (2.5 mg total) by nebulization every 6 (six) hours as needed for wheezing or shortness of breath. 25 vial 2   ? aspirin 81 MG EC tablet Take 1 tablet (81 mg total) by mouth daily.  0   ? budesonide-formoterol (SYMBICORT) 80-4.5 mcg/actuation inhaler Inhale 2 puffs 2 (two) times a day. 1 Inhaler 12   ? buPROPion (WELLBUTRIN SR) 150 MG 12 hr tablet Take 1 tablet (150 mg total) by mouth 2 (two) times a day. 180 tablet 3   ? cholecalciferol, vitamin D3, (VITAMIN D3) 5,000 unit Tab Take 1 tablet (5,000 Units total) by mouth daily. 90 tablet 3   ? fluticasone (FLONASE) 50 mcg/actuation nasal spray 1 spray into each nostril 2 (two) times a day. 16 g 11   ? gabapentin (NEURONTIN) 300 MG capsule Take 2 capsules in the morning, 1 capsule in the afternoon and 3 capsules at night 540 capsule 3   ? generic lancets (ACCU-CHEK FASTCLIX) USE TO TEST  TWICE DAILY 200 each 3   ? lisinopril (PRINIVIL,ZESTRIL) 20 MG tablet TAKE 1 TABLET TWICE DAILY FOR BLOOD PRESSURE 180 tablet 1   ? metFORMIN (GLUCOPHAGE XR) 500 MG 24 hr tablet Take 2 tablets (1,000 mg total) by mouth daily with breakfast. 180 tablet 3   ? simvastatin (ZOCOR) 40 MG tablet TAKE 1 AND 1/2 TABLETS EVERY  tablet 3   ? terazosin (HYTRIN) 10 MG capsule Take 1 capsule (10 mg total) by mouth at bedtime. 90 capsule 3   ? tiotropium (SPIRIVA WITH HANDIHALER) 18 mcg inhalation capsule INHALE CONTENTS OF 1 CAPSULE DAILY 30 capsule 5   ? traZODone (DESYREL) 50 MG tablet Take 1 tablet (50 mg total) by mouth at bedtime. As needed for sleep 90 tablet 0   ? testosterone (ANDROGEL) 1.62 % (40.5 mg/2.5 gram) GlPk Apply 1 pck daily 75 g 5     No current facility-administered medications for this visit.       No Known Allergies      Lab Results    Chemistry/lipid CBC Cardiac Enzymes/BNP/TSH/INR   Lab Results   Component Value Date    CHOL 137 10/22/2018    HDL 33 (L) 10/22/2018    LDLCALC 70 10/22/2018    TRIG 169 (H) 10/22/2018    CREATININE 0.91 04/01/2019    BUN 12 04/01/2019    K 5.0 04/01/2019     04/01/2019     04/01/2019    CO2 30 04/01/2019    Lab Results   Component Value Date    WBC 6.0 02/14/2019    HGB 15.2 04/01/2019    HCT 47.5 02/14/2019    MCV 99 02/14/2019     02/14/2019    Lab Results   Component Value Date    TROPONINI <0.01 07/26/2016    TSH 1.32 10/22/2018

## 2021-07-03 NOTE — ADDENDUM NOTE
Addendum Note by Thomas Wood, PharmD at 4/17/2019 12:30 PM     Author: Thomas Wood, PharmD Service: -- Author Type: Pharmacist    Filed: 4/19/2019  8:37 AM Encounter Date: 4/17/2019 Status: Signed    : Thomas Wood, PharmD (Pharmacist)    Addended by: THOMAS WOOD on: 4/19/2019 08:37 AM        Modules accepted: Orders

## 2021-08-16 NOTE — PROGRESS NOTES
Raf is a 71 year old who is being evaluated via a billable video visit.      How would you like to obtain your AVS? Mail a copy  If the video visit is dropped, the invitation should be resent by: Send to e-mail at: [email or web address]  Will anyone else be joining your video visit? No  Micaela Combs CMA  Does patient have any form of state insurance? yes    Do you have wifi? yes  Do you have a smart phone? NO      Video Start Time: 8:38 AM  Video-Visit Details    Type of service:  Video Visit    Video End Time:8:56 AM    Originating Location (pt. Location): Home    Distant Location (provider location):  [facility]     Platform used for Video Visit: AmWell     Additional 15 minutes was spent performing the following:    -Preparing to see the patient  -Ordering medications, tests, or procedures   -Documenting clinical information in the electronic or other health record     Thank you for the opportunity to participate in the care of Raf Dalton.     He is a 71 year old y/o male patient who comes to the sleep medicine clinic for follow up.  The patient was diagnosed with JOSE on 07/19/10 (AHI=60.6). The patient presents for his annual follow up. He is still getting benefit from CPAP therapy and he would still like to use [facility] as his DME. In the past few months he has been waking up with stomach pains. He is seeing a GI doctor to get this addressed. He will be getting a CT scan of the abdomen soon.       Assessment and Plan:  In summary Raf Dalton is a 71 year old year old male who is here for follow up.    1. JOSE (obstructive sleep apnea)  I congratulated him on his excellent CPAP usage. I will keep him on the same pressure range for now.  - SLEEP EVALUATION & MANAGEMENT REFERRAL - ADULT -[facility] [place] [phone] (Age 15 and up)  - COMPREHENSIVE DME     Compliance Download data for 30 Days:  Pressure setting:APAP 9-13 cwp  95% pressure:11.3 cwp  Residual  AHI:2 events per hour  Leak:Minimal  Compliance:87%  Mask Tolerance:Good  Skin irritation:None  DME:[facility]    Sleep-Wake Cycle:    The patient likes to initiate sleep at around 9-10 PM with a sleep latency of less than 10 minutes. The patient has 3 nocturnal awakenings. Final wake up time is around 5-6 AM.    JOELLEN:  JOELLEN Total Score: 21  Total score - [place]: 9 (8/16/2021 10:00 AM)        Patient Active Problem List   Diagnosis     Obstructive Sleep Apnea     Male Erectile Disorder Due To Physical Condition     Mixed hyperlipidemia     Cataract     Lower Back Sprain     Benign prostatic hyperplasia with weak urinary stream     Hypertension     Cervicalgia     Simple chronic bronchitis (H)     Benign Prostatic Hypertrophy     Aneurysm Of The Abdominal Aorta     Vitamin D Deficiency     Exercise hypoxemia     Type 2 diabetes mellitus (H)     Back pain     AAA (abdominal aortic aneurysm) without rupture (H)     AAA (abdominal aortic aneurysm) (H)     Chest pain     Benign neoplasm of ascending colon     COPD (chronic obstructive pulmonary disease) (H)     Diverticular disease of colon     Hemorrhoids     History of colonic polyps     Hyperglycemia     Hypoxia     Polyp of colon       Past Medical History:   Diagnosis Date     Abdominal aneurysm without mention of rupture     Created by Conversion      Cervicalgia     Created by Conversion      Chest pain 08/19/2019     Chronic airway obstruction, not elsewhere classified     Created by Conversion      Diabetes mellitus, type II (H)      High cholesterol      Hypertrophy of prostate with urinary obstruction and other lower urinary tract symptoms (LUTS)     Created by Conversion      Hypertrophy of prostate without urinary obstruction and other lower urinary tract symptoms (LUTS)     Created by Conversion      Hypoxemia     Created by Conversion      Obstructive sleep apnea (adult) (pediatric)     Created by Conversion      Other abnormal glucose     Created by Conversion       Other and unspecified hyperlipidemia     Created by Conversion      Shortness of breath      Sprain of unspecified site of sacroiliac region     Created by Conversion      Unspecified cataract     Created by Conversion      Unspecified essential hypertension     Created by Conversion      Unspecified vitamin D deficiency     Created by Conversion        Past Surgical History:   Procedure Laterality Date     ABDOMINAL AORTIC ANEURYSM REPAIR  04/09/2019     FINGER SURGERY         Social History     Socioeconomic History     Marital status:      Spouse name: Not on file     Number of children: Not on file     Years of education: Not on file     Highest education level: Not on file   Occupational History     Not on file   Tobacco Use     Smoking status: Current Some Day Smoker     Packs/day: 0.50     Years: 55.00     Pack years: 27.50     Types: Cigarettes     Last attempt to quit: 10/18/2017     Years since quitting: 3.8     Smokeless tobacco: Never Used     Tobacco comment: .5PPD   Substance and Sexual Activity     Alcohol use: Yes     Alcohol/week: 21.0 standard drinks     Comment: Alcoholic Drinks/day: 21 beers per week     Drug use: No     Sexual activity: Never     Partners: Female     Birth control/protection: None   Other Topics Concern     Parent/sibling w/ CABG, MI or angioplasty before 65F 55M? Not Asked   Social History Narrative     Not on file     Social Determinants of Health     Financial Resource Strain:      Difficulty of Paying Living Expenses:    Food Insecurity:      Worried About Running Out of Food in the Last Year:      Ran Out of Food in the Last Year:    Transportation Needs:      Lack of Transportation (Medical):      Lack of Transportation (Non-Medical):    Physical Activity:      Days of Exercise per Week:      Minutes of Exercise per Session:    Stress:      Feeling of Stress :    Social Connections:      Frequency of Communication with Friends and Family:      Frequency of  Social Gatherings with Friends and Family:      Attends Confucianism Services:      Active Member of Clubs or Organizations:      Attends Club or Organization Meetings:      Marital Status:    Intimate Partner Violence:      Fear of Current or Ex-Partner:      Emotionally Abused:      Physically Abused:      Sexually Abused:        Current Outpatient Medications   Medication Sig Dispense Refill     albuterol (PROAIR HFA;PROVENTIL HFA;VENTOLIN HFA) 90 mcg/actuation inhaler [ALBUTEROL (PROAIR HFA;PROVENTIL HFA;VENTOLIN HFA) 90 MCG/ACTUATION INHALER] Inhale 2 puffs every 6 (six) hours as needed for wheezing. 1 each 6     albuterol (PROVENTIL) 2.5 mg /3 mL (0.083 %) nebulizer solution [ALBUTEROL (PROVENTIL) 2.5 MG /3 ML (0.083 %) NEBULIZER SOLUTION] Take 3 mL (2.5 mg total) by nebulization every 6 (six) hours as needed for wheezing or shortness of breath. 25 vial 2     aspirin 81 MG EC tablet [ASPIRIN 81 MG EC TABLET] Take 1 tablet (81 mg total) by mouth daily.  0     budesonide-formoterol (SYMBICORT) 80-4.5 mcg/actuation inhaler [BUDESONIDE-FORMOTEROL (SYMBICORT) 80-4.5 MCG/ACTUATION INHALER] Inhale 2 puffs 2 (two) times a day. 1 Inhaler 12     buPROPion (WELLBUTRIN SR) 150 MG 12 hr tablet [BUPROPION (WELLBUTRIN SR) 150 MG 12 HR TABLET] TAKE 1 TABLET TWICE DAILY 180 tablet 2     cholecalciferol, vitamin D3, (VITAMIN D3) 5,000 unit Tab [CHOLECALCIFEROL, VITAMIN D3, (VITAMIN D3) 5,000 UNIT TAB] Take 1 tablet (5,000 Units total) by mouth daily. 90 tablet 3     cyanocobalamin 1000 MCG tablet [CYANOCOBALAMIN 1000 MCG TABLET] Take 1 tablet (1,000 mcg total) by mouth daily. 100 tablet 3     gabapentin (NEURONTIN) 300 MG capsule [GABAPENTIN (NEURONTIN) 300 MG CAPSULE] Take 3 capsules in the morning, 2 in the afternoon and 3 capsules in the evening 720 capsule 3     generic lancets (ACCU-CHEK FASTCLIX) [GENERIC LANCETS (ACCU-CHEK FASTCLIX)] USE TO TEST TWICE DAILY 200 each 3     inhalational spacing device Spcr [INHALATIONAL  SPACING DEVICE SPCR] Use two times a day with symbicort 1 each 0     ketoconazole (NIZORAL) 2 % cream [KETOCONAZOLE (NIZORAL) 2 % CREAM] ketoconazole 2 % topical cream   APPLY TO FUNGUS NAILS DAILY FOR 90 DAYS       lisinopriL (PRINIVIL,ZESTRIL) 20 MG tablet [LISINOPRIL (PRINIVIL,ZESTRIL) 20 MG TABLET] TAKE 1 TABLET TWICE DAILY FOR BLOOD PRESSURE 180 tablet 1     metFORMIN (GLUCOPHAGE-XR) 500 MG 24 hr tablet [METFORMIN (GLUCOPHAGE-XR) 500 MG 24 HR TABLET] TAKE 2 TABLETS TWICE DAILY 360 tablet 3     oxybutynin (DITROPAN) 5 MG tablet [OXYBUTYNIN (DITROPAN) 5 MG TABLET]        sildenafil (REVATIO) 20 mg tablet [SILDENAFIL (REVATIO) 20 MG TABLET] Take by mouth as needed.         3     simvastatin (ZOCOR) 40 MG tablet [SIMVASTATIN (ZOCOR) 40 MG TABLET] TAKE 1 AND 1/2 TABLETS EVERY  tablet 3     terazosin (HYTRIN) 10 MG capsule [TERAZOSIN (HYTRIN) 10 MG CAPSULE] TAKE 2 CAPSULES AT BEDTIME 180 capsule 2     tiotropium (SPIRIVA WITH HANDIHALER) 18 mcg inhalation capsule [TIOTROPIUM (SPIRIVA WITH HANDIHALER) 18 MCG INHALATION CAPSULE] INHALE CONTENTS OF 1 CAPSULE DAILY 30 capsule 5     TRULICITY 0.75 mg/0.5 mL PnIj [TRULICITY 0.75 MG/0.5 ML PNIJ] INJECT  0.75MG  UNDER  THE  SKIN EVERY 7 DAYS 12 Syringe 3     ACCU-CHEK TORI Misc [ACCU-CHEK TORI MISC]        ACCU-CHEK SMARTVIEW TEST STRIP strips [ACCU-CHEK SMARTVIEW TEST STRIP STRIPS] TEST BLOOD SUGAR TWICE DAILY 200 strip 3     acetaminophen (TYLENOL) 500 MG tablet [ACETAMINOPHEN (TYLENOL) 500 MG TABLET] Take 500 mg by mouth as needed. (Patient not taking: Reported on 8/16/2021)       fluticasone propionate (FLONASE) 50 mcg/actuation nasal spray [FLUTICASONE PROPIONATE (FLONASE) 50 MCG/ACTUATION NASAL SPRAY] 1 spray into each nostril 2 (two) times a day as needed. (Patient not taking: Reported on 8/16/2021) 16 g 11     testosterone (ANDROGEL) 1.62 % (40.5 mg/2.5 gram) GlPk [TESTOSTERONE (ANDROGEL) 1.62 % (40.5 MG/2.5 GRAM) GLPK] Apply 1 pck daily 75 g 5     traZODone  (DESYREL) 50 MG tablet [TRAZODONE (DESYREL) 50 MG TABLET] Take 1 tablet (50 mg total) by mouth at bedtime as needed for sleep. (Patient not taking: Reported on 8/16/2021) 90 tablet 1       No Known Allergies    Physical Exam:  GEN: NAD,  Psych: normal mood, normal affect    Labs/Studies:    No results found for: CATARINA    I reviewed the efficacy and compliance report from his device. Data summarized on the HPI and the PAP compliance flow sheet.     Patient verbalized understanding of these issues, agrees with the plan and all questions were answered today. Patient was given an opportuntity to voice any other symptoms or concerns not listed above. Patient did not have any other symptoms or concerns.      Michel Veronica DO  Board Certified in Internal Medicine and Sleep Medicine    (Note created with Dragon voice recognition and unintended spelling errors and word substitutions may occur)     Audio and visual devices were used for this virtual clinic visit with permission from patient.

## 2021-08-16 NOTE — PATIENT INSTRUCTIONS
Your BMI is Body mass index is 32.36 kg/m .  Weight management is a personal decision.  If you are interested in exploring weight loss strategies, the following discussion covers the approaches that may be successful. Body mass index (BMI) is one way to tell whether you are at a healthy weight, overweight, or obese. It measures your weight in relation to your height.  A BMI of 18.5 to 24.9 is in the healthy range. A person with a BMI of 25 to 29.9 is considered overweight, and someone with a BMI of 30 or greater is considered obese. More than two-thirds of American adults are considered overweight or obese.  Being overweight or obese increases the risk for further weight gain. Excess weight may lead to heart disease and diabetes.  Creating and following plans for healthy eating and physical activity may help you improve your health.  Weight control is part of healthy lifestyle and includes exercise, emotional health, and healthy eating habits. Careful eating habits lifelong are the mainstay of weight control. Though there are significant health benefits from weight loss, long-term weight loss with diet alone may be very difficult to achieve- studies show long-term success with dietary management in less than 10% of people. Attaining a healthy weight may be especially difficult to achieve in those with severe obesity. In some cases, medications, devices and surgical management might be considered.  What can you do?  If you are overweight or obese and are interested in methods for weight loss, you should discuss this with your provider.     Consider reducing daily calorie intake by 500 calories.     Keep a food journal.     Avoiding skipping meals, consider cutting portions instead.    Diet combined with exercise helps maintain muscle while optimizing fat loss. Strength training is particularly important for building and maintaining muscle mass. Exercise helps reduce stress, increase energy, and improves fitness.  Increasing exercise without diet control, however, may not burn enough calories to loose weight.       Start walking three days a week 10-20 minutes at a time    Work towards walking thirty minutes five days a week     Eventually, increase the speed of your walking for 1-2 minutes at time    In addition, we recommend that you review healthy lifestyles and methods for weight loss available through the National Institutes of Health patient information sites:  http://win.niddk.nih.gov/publications/index.htm    And look into health and wellness programs that may be available through your health insurance provider, employer, local community center, or kg club.    Weight management plan: Patient was referred to their PCP to discuss a diet and exercise plan.

## 2021-09-25 PROBLEM — K83.1 BILIARY OBSTRUCTION (H): Status: ACTIVE | Noted: 2021-01-01

## 2021-09-25 PROBLEM — K86.89 PANCREATIC MASS: Status: ACTIVE | Noted: 2021-01-01

## 2021-09-25 PROBLEM — C25.0: Status: ACTIVE | Noted: 2021-01-01

## 2021-09-25 NOTE — PHARMACY-ADMISSION MEDICATION HISTORY
Admission medication history completed at [facility]. Please see Pharmacy - Admission Medication History note from 9/25/2021.

## 2021-09-25 NOTE — CONSULTS
"GI CONSULT NOTE      Name: Raf Dalton  Medical Record #: [id number]  YOB: 1950  Date of Admission: 9/25/2021  Date/Time: 9/25/2021/1:23 PM     CHIEF COMPLAINT: Jaundice    HISTORY OF PRESENT ILLNESS: We were asked to see Raf Dalton by Dr Rueda for evaluation of abnormal liver tests and concerns for biliary obstruction.   Raf Dalton is a 71 year old year old male with history of COPD, lung nodules, ongoing tobacco use, JOSE, AAA repair, lung nodules, and heavy alcohol use who presented to the ER for further evaluation of jaundice. The patient reports not feeling well over the past few onths. He describes having abdominal bloating, weakness, with associated 68# weight loss over the past few months. The patient has been seen by his primary care provider and reportedly had a MRI 9/15/21 that revealed a pancreatic mass. Plans have been put in place for EUS +/- ERCP at [facility] 9/28/21 with Dr Daniels.  The patient reports having more generalized abdominal discomfort, pruritis, and jaundice over the past three days. Given these symptoms, he presented to the ER where total bilirubin was found to be elevated to 25.1. Lipase 87. WBCs are not elevated and Hgb is 12.4. CT abdomen and pelvis in the ER shows mod. Intra and extrahepatic bile duct dilation wth enlarging ill-defined pancreatic head mass. There is mild mural thickening in the proximal duodenum.    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 His two sisters are at the bedside.   He smokes cigarettes. He admits to heavy alcohol use (6beers per day), but has cut back over the past few weeks due to not feeling well. \"I can't even finish a beer.\" He denies having withdrawal symptoms.   Colonoscopy 9/27/18 revealed internal hemorrhoids, diverticulosis, and tubular adenoma X2 removed. Recommended to have a repeat colonoscopy in 3 years (scheduled for 11/2021).    REVIEW OF SYSTEMS (ROS): Complete review of systems negative other than listed in " HPI.    PAST MEDICAL HISTORY:  Past Medical History:   Diagnosis Date     Abdominal aneurysm without mention of rupture     Created by Conversion      Cervicalgia     Created by Conversion      Chest pain 08/19/2019     Chronic airway obstruction, not elsewhere classified     Created by Conversion      Diabetes mellitus, type II (H)      High cholesterol      Hypertrophy of prostate with urinary obstruction and other lower urinary tract symptoms (LUTS)     Created by Conversion      Hypertrophy of prostate without urinary obstruction and other lower urinary tract symptoms (LUTS)     Created by Conversion      Hypoxemia     Created by Conversion      Obstructive sleep apnea (adult) (pediatric)     Created by Conversion      Other abnormal glucose     Created by Conversion      Other and unspecified hyperlipidemia     Created by Conversion      Shortness of breath      Sprain of unspecified site of sacroiliac region     Created by Conversion      Unspecified cataract     Created by Conversion      Unspecified essential hypertension     Created by Conversion      Unspecified vitamin D deficiency     Created by Conversion         FAMILY HISTORY:  Family History   Problem Relation Age of Onset     Snoring Father            MEDICATIONS PRIOR TO ADMISSION: (Not in a hospital admission)         ALLERGIES: Patient has no known allergies.    PHYSICAL EXAM:    BP (!) 141/84   Pulse 68   Temp 98.1  F (36.7  C) (Oral)   Resp 16   Wt 85.5 kg (188 lb 6.4 oz)   SpO2 95%   BMI 28.23 kg/m      GENERAL: Pleasant, no obvious distress, +jaundice,  NECK: Supple without adenopathy  EYES: scleral icterus  LUNGS: Clear to auscultation bilaterally  HEART: S1S2, no lower extremity edema  ABDOMEN: Non-distended. Positive bowel sounds. Soft, non-tender, no guarding/rebound  MUSKULOSKELETAL:  Warm and well perfused, moves all extremities well  NEUROLOGIC: Alert and oriented  PSYCHIATRIC: Normal affect    LAB DATA:  CMP Results:   Recent  Labs   Lab Test 09/25/21  1014 08/02/21  0936 06/02/21  0926 02/01/21  0959 02/01/21  0959   * 140 142   < > 141   POTASSIUM 4.5 4.0 4.3   < > 4.2   CHLORIDE 95* 103 103   < > 104   CO2 22 28 30   < > 28   ANIONGAP 14 9 9   < > 9   * 152* 93   < > 99   BUN 13 9 14   < > 14   CR 1.29 1.11 1.06   < > 1.15   BILITOTAL 25.1* 0.4  --   --  0.7   ALKPHOS 598* 84  --   --  71   * 12  --   --  12   * 13  --   --  14    < > = values in this interval not displayed.      CBC  Recent Labs   Lab 09/25/21  1014   WBC 6.1   RBC 3.98*   HGB 12.4*   HCT 35.7*   MCV 90   MCH 31.2   MCHC 34.7   RDW 15.3*        INRNo lab results found in last 7 days.   Lipase   Date Value Ref Range Status   09/25/2021 87 (H) 0 - 52 U/L Final   08/19/2019 18 0 - 52 U/L Final       IMAGING:  EXAM: CT ABDOMEN PELVIS W CONTRAST  LOCATION: [facility]  DATE/TIME: 9/25/2021 11:39 AM     INDICATION: Pancreatic cancer. Jaundice.  COMPARISON: 8/31/2021  TECHNIQUE: CT scan of the abdomen and pelvis was performed following injection of IV contrast. Multiplanar reformats were obtained. Dose reduction techniques were used.  CONTRAST: Isovue-370 75 mL IV     FINDINGS:   LOWER CHEST: A 5 mm right middle lobe nodule and 4 mm right lower lobe nodule (image 1) are unchanged.     HEPATOBILIARY: Increased moderate intra and extrahepatic bile duct dilatation to the level of the ill-defined pancreatic head mass. Mild gallbladder distention. No calcified gallstones. Moderate diffuse hepatic steatosis. No focal liver lesions.     PANCREAS: Ill-defined pancreatic head mass has increased from approximate dimensions of 4.5 x 3.5 cm to 5.0 x 4.5 cm and the associated distal parenchymal atrophy and ductal dilatation is unchanged.     SPLEEN: Normal.     ADRENAL GLANDS: Normal.     KIDNEYS/BLADDER: Mild chronic parenchymal atrophy right kidney unchanged.     BOWEL: Mild mural thickening proximal duodenal segments adjacent  to the pancreatic head mass has developed.     LYMPH NODES: Mild peripancreatic, portacaval and maddy hepatis lymphadenopathy has increased.     VASCULATURE: AAA stent graft unchanged.     PELVIC ORGANS: Normal.     MUSCULOSKELETAL: Normal.                                                                      IMPRESSION:   1.  Increased moderate intra and extrahepatic bile duct dilatation to the level of the enlarging ill-defined pancreatic head mass.  2.  Mild mural thickening proximal duodenum adjacent to the pancreatic head mass has developed and the mild upper abdomen lymph node enlargement has increased.  3.  Several small lung nodules stable.    ASSESSMENT:  Biliary obstruction, consistent with pancreatic head mass as seen on CT-- This is a pleasant 71 year old year old male with history of COPD, lung nodules, ongoing tobacco use, JOSE, AAA repair, lung nodules, and heavy alcohol use who presented to the ER for further evaluation of jaundice. He reports not feeling well over the past few months and reports having a 68# weight loss as well. Outpatient evaluation included a MRI (I do not have records) 9/15/21 that revealed a pancreatic mass as also seen on imaging/CT abdomen and pelvis earlier today. Suspect biliary obstruction and therefore recommend proceeding with EUS/ERCP. The patient will need to transfer to [facility] for further evaluation.     The patient reports heavy alcohol use, but recently cut back over the past few weeks. Monitor for etoh withdrawal.  I encouraged the patient to stop smoking cigarettes.   PLAN:  Covid test is pending.   Add on INR. Pain control.   NPO. Plans for transfer to [facility] for EUS/ERCP are underway. Will tentatively plan for eus/ercp 9/26/21 at 10AM.    Discussed with Dr. Garza who will also visit with the patient.                                                Aura Villavicencio PA-C  Thank you for the opportunity to participate in the care of this patient.   Please feel free  to call me with any questions or concerns.  Phone number [phone].

## 2021-09-25 NOTE — PHARMACY-ADMISSION MEDICATION HISTORY
"Pharmacy Note - Admission Medication History    Pertinent Provider Information:     Patient is a good historian, able to tell how many pills and frequency of his medications. He states family members can bring in his 3 inhalers and flonase for use when at [facility]. He has not had any medications today and is due for all medications.     He has trazodone but has only used ONCE since prescribed. States trazodone \"kicked my a**\" and does not wish to take d/t inability to function during day time. Does not want it off his med list until MD gives the ok. Did not yolanda as taking for inpatient use. Also melatonin 3 mg also causes inability to function in the day time (melatonin not taking)    Using androgel prn. Not needed while hospitalized.      ______________________________________________________________________    Prior To Admission (PTA) med list completed and updated in EMR.       PTA Med List   Medication Sig Note Last Dose     acetaminophen (TYLENOL) 500 MG tablet Take 500-1,000 mg by mouth nightly as needed   Past Week at Unknown time     albuterol (PROAIR HFA;PROVENTIL HFA;VENTOLIN HFA) 90 mcg/actuation inhaler [ALBUTEROL (PROAIR HFA;PROVENTIL HFA;VENTOLIN HFA) 90 MCG/ACTUATION INHALER] Inhale 2 puffs every 6 (six) hours as needed for wheezing. 9/25/2021: Patient will supply at [facility]  Unknown at Unknown time     albuterol (PROVENTIL) 2.5 mg /3 mL (0.083 %) nebulizer solution [ALBUTEROL (PROVENTIL) 2.5 MG /3 ML (0.083 %) NEBULIZER SOLUTION] Take 3 mL (2.5 mg total) by nebulization every 6 (six) hours as needed for wheezing or shortness of breath.  Unknown at Unknown time     aspirin 81 MG EC tablet [ASPIRIN 81 MG EC TABLET] Take 1 tablet (81 mg total) by mouth daily.  9/24/2021 at Unknown time     budesonide-formoterol (SYMBICORT) 80-4.5 mcg/actuation inhaler [BUDESONIDE-FORMOTEROL (SYMBICORT) 80-4.5 MCG/ACTUATION INHALER] Inhale 2 puffs 2 (two) times a day. 9/25/2021: Patient will supply at [facility]" 9/24/2021 at Unknown time     buPROPion (WELLBUTRIN SR) 150 MG 12 hr tablet [BUPROPION (WELLBUTRIN SR) 150 MG 12 HR TABLET] TAKE 1 TABLET TWICE DAILY  9/24/2021 at Unknown time     cholecalciferol, vitamin D3, (VITAMIN D3) 5,000 unit Tab [CHOLECALCIFEROL, VITAMIN D3, (VITAMIN D3) 5,000 UNIT TAB] Take 1 tablet (5,000 Units total) by mouth daily. (Patient taking differently: Take 1,000 Units by mouth daily )  9/24/2021 at Unknown time     cyanocobalamin 1000 MCG tablet [CYANOCOBALAMIN 1000 MCG TABLET] Take 1 tablet (1,000 mcg total) by mouth daily.  9/24/2021 at Unknown time     fluticasone propionate (FLONASE) 50 mcg/actuation nasal spray [FLUTICASONE PROPIONATE (FLONASE) 50 MCG/ACTUATION NASAL SPRAY] 1 spray into each nostril 2 (two) times a day as needed. 9/25/2021: Patient will supply at [facility]  Unknown at Unknown time     gabapentin (NEURONTIN) 300 MG capsule [GABAPENTIN (NEURONTIN) 300 MG CAPSULE] Take 3 capsules in the morning, 2 in the afternoon and 3 capsules in the evening (Patient taking differently: Take 900 mg by mouth 2 times daily )  9/24/2021 at Unknown time     ibuprofen (ADVIL/MOTRIN) 200 MG tablet Take 600 mg by mouth nightly as needed for mild pain  Past Week at Unknown time     ketoconazole (NIZORAL) 2 % cream [KETOCONAZOLE (NIZORAL) 2 % CREAM] ketoconazole 2 % topical cream   APPLY TO FUNGUS NAILS DAILY FOR 90 DAYS  Unknown at Unknown time     lisinopriL (PRINIVIL,ZESTRIL) 20 MG tablet [LISINOPRIL (PRINIVIL,ZESTRIL) 20 MG TABLET] TAKE 1 TABLET TWICE DAILY FOR BLOOD PRESSURE  9/24/2021 at Unknown time     metFORMIN (GLUCOPHAGE-XR) 500 MG 24 hr tablet [METFORMIN (GLUCOPHAGE-XR) 500 MG 24 HR TABLET] TAKE 2 TABLETS TWICE DAILY  9/24/2021 at Unknown time     oxybutynin (DITROPAN) 5 MG tablet Take 2.5 mg by mouth 2 times daily   9/24/2021 at Unknown time     oxyCODONE (ROXICODONE) 5 MG tablet Take 5 mg by mouth every 6 hours as needed for severe pain 9/25/2021: Averages ~ 3 per day. Takes 5 mg  every 6 hours scheduled.  9/24/2021 at Unknown time     sildenafil (REVATIO) 20 mg tablet [SILDENAFIL (REVATIO) 20 MG TABLET] Take by mouth as needed.        9/25/2021: None taken within the last 48 hrs Unknown at does have supply     simvastatin (ZOCOR) 40 MG tablet [SIMVASTATIN (ZOCOR) 40 MG TABLET] TAKE 1 AND 1/2 TABLETS EVERY DAY (Patient taking differently: At Bedtime )  9/24/2021 at Unknown time     terazosin (HYTRIN) 10 MG capsule [TERAZOSIN (HYTRIN) 10 MG CAPSULE] TAKE 2 CAPSULES AT BEDTIME  9/24/2021 at Unknown time     testosterone (ANDROGEL) 1.62 % (40.5 mg/2.5 gram) GlPk [TESTOSTERONE (ANDROGEL) 1.62 % (40.5 MG/2.5 GRAM) GLPK] Apply 1 pck daily       tiotropium (SPIRIVA WITH HANDIHALER) 18 mcg inhalation capsule [TIOTROPIUM (SPIRIVA WITH HANDIHALER) 18 MCG INHALATION CAPSULE] INHALE CONTENTS OF 1 CAPSULE DAILY 9/25/2021: Patient will supply at [facility]  9/24/2021 at Unknown time     TRULICITY 0.75 mg/0.5 mL PnIj [TRULICITY 0.75 MG/0.5 ML PNIJ] INJECT  0.75MG  UNDER  THE  SKIN EVERY 7 DAYS (Patient taking differently: Inject 0.75 mg Subcutaneous every 7 days mondays)  9/20/2021       Information source(s): Patient, Family member, Clinic records and [facility]  Method of interview communication: in-person    Summary of Changes to PTA Med List  New: oxycodone   Discontinued: advil  Changed: acetaminophen to bedtime, cholecalciferol to 1000units daily, gabapentin to 900mg twice daily, oxybutynin to 2.5 mg twice daily,     Patient was asked about OTC/herbal products specifically.  PTA med list reflects this.    In the past week, patient estimated taking medication this percent of the time:  greater than 90%.    Allergies were reviewed, assessed, and updated with the patient.      Medications currently not available for use during hospital stay. Family/Patient representative states they will bring 3 inhalers and flonase to [facility].    The information provided in this note is only  as accurate as the sources available at the time of the update(s).    Thank you for the opportunity to participate in the care of this patient.    Daniel Issa [facility]  9/25/2021 4:04 PM

## 2021-09-25 NOTE — ED PROVIDER NOTES
EMERGENCY DEPARTMENT ENCOUNTER      NAME: Raf Dalton  AGE: 71 year old male  YOB: 1950  MRN: [id number]  EVALUATION DATE & TIME: 9/25/2021  8:52 AM    PCP: Bekah Castanon    ED PROVIDER: Nehemiah Pisano M.D.      Chief Complaint   Patient presents with     Fatigue     Jaundice         FINAL IMPRESSION:  1. Biliary obstruction    2. Cancer of head of pancreas (H)          ED COURSE & MEDICAL DECISION MAKING:    Pertinent Labs & Imaging studies reviewed. (See chart for details)  ED Course as of Sep 25 1416   Sat Sep 25, 2021   1007 Patient is a 69-year-old male with a history of aortic aneurysm with repair, new diagnosis of pancreatic mass (patient is not sure of actual diagnosis), here with new jaundice over the past few days.  Unchanged epigastric pain that is managed at home with oxycodone.  His blood pressure 133 systolic, normal temperature and heart rate, and he is nonseptic appearing.  He is clearly jaundiced, noticeable walking into the room.  He has mild epigastric tenderness but no guarding or rebound.  I do think he has SBP, but based on clinical picture and history he likely has obstruction in his common bile duct or pancreatic duct.  We will get a CT scan to confirm this, check labs.      1121 Bilirubin Total(!!): 25.1   1222 1.  Increased moderate intra and extrahepatic bile duct dilatation to the level of the enlarging ill-defined pancreatic head mass.  2.  Mild mural thickening proximal duodenum adjacent to the pancreatic head mass has developed and the mild upper abdomen lymph node enlargement has increased.  3.  Several small lung nodules stable.   CT Abdomen Pelvis w Contrast   1258 I spoke to GI who would like the pt transferred to [facility]. Pt is amenable. COVID swab ordered. 2nd dose of dilaudid and MIVF ordered      1415 SARS CoV2 PCR: Negative       2:23 PM I spoke to Dr. Layton at [facility] who accepted the patient.  Transport pending once nursing signout  occurs.      Additional ED Course Timestamps:  9:43 AM I met with the patient for initial interview and exam. Discussed plan of care including labs.  12:23 PM I paged out to GI.  12:46 PM I spoke with GI, Dr. Garza, over the phone, who agreed with our plan of care and transfer to [facility].  12:48 PM At the conclusion of the encounter I discussed the results of all of the tests and the disposition. The questions were answered. The patient or family acknowledged understanding and was agreeable with the care plan.     MEDICATIONS GIVEN IN THE EMERGENCY:  Medications   iopamidol (ISOVUE-370) solution 100 mL (75 mLs Intravenous Given 9/25/21 1140)   HYDROmorphone (PF) (DILAUDID) injection 0.5 mg (0.5 mg Intravenous Given 9/25/21 1308)   sodium chloride 0.9% infusion (100 mLs Intravenous New Bag 9/25/21 1307)         NEW PRESCRIPTIONS STARTED AT TODAY'S ER VISIT  New Prescriptions    No medications on file          =================================================================    HPI    Patient information was obtained from: patient     Use of : N/A      Raf ALLISON Dalton is a 71 year old male with a pertinent history of hypertension, COPD, hyperlipidemia, diverticulitis, DM type II who presents to this ED by EMS for evaluation of fatigue, jaundice.    Patient reports to the ED with worsening jaundice over the past 2 days, with associated decreased appetite. Yesterday, after meeting his PCP patient developed constant generalized body aches. Denies headache or vision changes. Took oxycodone ~1 hour which provided mild relief.     Notes history of lung nodes and is currently going through pancreatic cancer workup. He also mentions recent passing of his brother in law ~week ago and decline in condition of his double amputee wife who is still in the ICU.    REVIEW OF SYSTEMS   Review of Systems   Constitutional:        Positive for decreased appetite.   Eyes: Negative for visual disturbance.   Musculoskeletal:  Positive for myalgias (generalized).   Skin:        Positive for jaundice.   Neurological: Negative for headaches.        PAST MEDICAL HISTORY:  Past Medical History:   Diagnosis Date     Abdominal aneurysm without mention of rupture     Created by Conversion      Cervicalgia     Created by Conversion      Chest pain 08/19/2019     Chronic airway obstruction, not elsewhere classified     Created by Conversion      Diabetes mellitus, type II (H)      High cholesterol      Hypertrophy of prostate with urinary obstruction and other lower urinary tract symptoms (LUTS)     Created by Conversion      Hypertrophy of prostate without urinary obstruction and other lower urinary tract symptoms (LUTS)     Created by Conversion      Hypoxemia     Created by Conversion      Obstructive sleep apnea (adult) (pediatric)     Created by Conversion      Other abnormal glucose     Created by Conversion      Other and unspecified hyperlipidemia     Created by Conversion      Shortness of breath      Sprain of unspecified site of sacroiliac region     Created by Conversion      Unspecified cataract     Created by Conversion      Unspecified essential hypertension     Created by Conversion      Unspecified vitamin D deficiency     Created by Conversion        PAST SURGICAL HISTORY:  Past Surgical History:   Procedure Laterality Date     ABDOMINAL AORTIC ANEURYSM REPAIR  04/09/2019     FINGER SURGERY             CURRENT MEDICATIONS:    No current facility-administered medications for this encounter.     Current Outpatient Medications   Medication     ACCU-CHEK TORI Misc     ACCU-CHEK SMARTVIEW TEST STRIP strips     acetaminophen (TYLENOL) 500 MG tablet     albuterol (PROAIR HFA;PROVENTIL HFA;VENTOLIN HFA) 90 mcg/actuation inhaler     albuterol (PROVENTIL) 2.5 mg /3 mL (0.083 %) nebulizer solution     aspirin 81 MG EC tablet     budesonide-formoterol (SYMBICORT) 80-4.5 mcg/actuation inhaler     buPROPion (WELLBUTRIN SR) 150 MG 12 hr tablet      cholecalciferol, vitamin D3, (VITAMIN D3) 5,000 unit Tab     cyanocobalamin 1000 MCG tablet     fluticasone propionate (FLONASE) 50 mcg/actuation nasal spray     gabapentin (NEURONTIN) 300 MG capsule     generic lancets (ACCU-CHEK FASTCLIX)     inhalational spacing device Spcr     ketoconazole (NIZORAL) 2 % cream     lisinopriL (PRINIVIL,ZESTRIL) 20 MG tablet     metFORMIN (GLUCOPHAGE-XR) 500 MG 24 hr tablet     oxybutynin (DITROPAN) 5 MG tablet     sildenafil (REVATIO) 20 mg tablet     simvastatin (ZOCOR) 40 MG tablet     terazosin (HYTRIN) 10 MG capsule     testosterone (ANDROGEL) 1.62 % (40.5 mg/2.5 gram) GlPk     tiotropium (SPIRIVA WITH HANDIHALER) 18 mcg inhalation capsule     traZODone (DESYREL) 50 MG tablet     TRULICITY 0.75 mg/0.5 mL PnIj       ALLERGIES:  No Known Allergies    FAMILY HISTORY:  Family History   Problem Relation Age of Onset     Snoring Father        SOCIAL HISTORY:   Social History     Socioeconomic History     Marital status:      Spouse name: Not on file     Number of children: Not on file     Years of education: Not on file     Highest education level: Not on file   Occupational History     Not on file   Tobacco Use     Smoking status: Current Some Day Smoker     Packs/day: 0.50     Years: 55.00     Pack years: 27.50     Types: Cigarettes     Last attempt to quit: 10/18/2017     Years since quitting: 3.9     Smokeless tobacco: Never Used     Tobacco comment: .5PPD   Substance and Sexual Activity     Alcohol use: Yes     Alcohol/week: 21.0 standard drinks     Comment: Alcoholic Drinks/day: 21 beers per week     Drug use: No     Sexual activity: Never     Partners: Female     Birth control/protection: None   Other Topics Concern     Parent/sibling w/ CABG, MI or angioplasty before 65F 55M? Not Asked   Social History Narrative     Not on file     Social Determinants of Health     Financial Resource Strain:      Difficulty of Paying Living Expenses:    Food Insecurity:       Worried About Running Out of Food in the Last Year:      Ran Out of Food in the Last Year:    Transportation Needs:      Lack of Transportation (Medical):      Lack of Transportation (Non-Medical):    Physical Activity:      Days of Exercise per Week:      Minutes of Exercise per Session:    Stress:      Feeling of Stress :    Social Connections:      Frequency of Communication with Friends and Family:      Frequency of Social Gatherings with Friends and Family:      Attends Yazidism Services:      Active Member of Clubs or Organizations:      Attends Club or Organization Meetings:      Marital Status:    Intimate Partner Violence:      Fear of Current or Ex-Partner:      Emotionally Abused:      Physically Abused:      Sexually Abused:        VITALS:  /74   Pulse 69   Temp 98.1  F (36.7  C) (Oral)   Resp 16   Wt 85.5 kg (188 lb 6.4 oz)   SpO2 94%   BMI 28.23 kg/m      PHYSICAL EXAM    Constitutional: Clearly jaundice walking into the room. Well developed, well nourished. Comfortable appearing.  HENT: Normocephalic, atraumatic, mucous membranes moist, nose normal. Neck- Supple, gross ROM intact.   Eyes: Pupils mid-range, conjunctiva without injection, no discharge.   Respiratory: Clear to auscultation bilaterally, no respiratory distress, no wheezing, speaks full sentences easily. No cough.  Cardiovascular: Normal heart rate, regular rhythm, no murmurs. No pedal edema, 2+ DP pulses.   GI: Minimal epigastric tenderness with no rebound or guarding. Soft, no masses.  Musculoskeletal: Moving all 4 extremities intentionally and without pain. No obvious deformity.  Skin: Warm, dry, no rash.  Neurologic: Alert & oriented x 3, cranial nerves grossly intact.  Psychiatric: Affect normal, cooperative.       LAB:  All pertinent labs reviewed and interpreted.  Labs Ordered and Resulted from Time of ED Arrival Up to the Time of Departure from the ED   CBC WITH PLATELETS - Abnormal; Notable for the following  components:       Result Value    RBC Count 3.98 (*)     Hemoglobin 12.4 (*)     Hematocrit 35.7 (*)     RDW 15.3 (*)     All other components within normal limits   BASIC METABOLIC PANEL - Abnormal; Notable for the following components:    Sodium 131 (*)     Chloride 95 (*)     Glucose 146 (*)     GFR Estimate 55 (*)     All other components within normal limits   HEPATIC FUNCTION PANEL - Abnormal; Notable for the following components:    Bilirubin Total 25.1 (*)     Bilirubin Direct 16.4 (*)     Albumin 2.8 (*)     Alkaline Phosphatase 598 (*)      (*)      (*)     All other components within normal limits   LIPASE - Abnormal; Notable for the following components:    Lipase 87 (*)     All other components within normal limits   AMMONIA - Abnormal; Notable for the following components:    Ammonia 36 (*)     All other components within normal limits   COVID-19 VIRUS (CORONAVIRUS) BY PCR - Normal    Narrative:     Testing was performed using the [employer]ama  SARS-CoV-2 & Influenza A/B Assay on the usama  Alfreda  System.  This test should be ordered for the detection of SARS-COV-2 in individuals who meet SARS-CoV-2 clinical and/or epidemiological criteria. Test performance is unknown in asymptomatic patients.  This test is for in vitro diagnostic use under the FDA EUA for laboratories certified under CLIA to perform moderate and/or high complexity testing. This test has not been FDA cleared or approved.  A negative test does not rule out the presence of PCR inhibitors in the specimen or target RNA in concentration below the limit of detection for the assay. The possibility of a false negative should be considered if the patient's recent exposure or clinical presentation suggests COVID-19.  [facility] are certified under the Clinical Laboratory Improvement Amendments of 1988 (CLIA-88) as qualified to perform moderate and/or high complexity laboratory testing.   INR - Normal   PERIPHERAL IV  CATHETER       RADIOLOGY:  Reviewed all pertinent imaging. Please see official radiology report.  CT Abdomen Pelvis w Contrast   Final Result   IMPRESSION:    1.  Increased moderate intra and extrahepatic bile duct dilatation to the level of the enlarging ill-defined pancreatic head mass.   2.  Mild mural thickening proximal duodenum adjacent to the pancreatic head mass has developed and the mild upper abdomen lymph node enlargement has increased.   3.  Several small lung nodules stable.          EKG:    All EKG interpretations will be found in ED course above.      I, Adrián Rueda am serving as a scribe to document services personally performed by Dr. Nehemiah Pisano based on my observation and the provider's statements to me. I, Nehemiah Pisano MD attest that Adrián Rueda is acting in a scribe capacity, has observed my performance of the services and has documented them in accordance with my direction.    Nehemiah Pisano M.D.  Emergency Medicine  [facility] EMERGENCY ROOM  [facility] [facility] [id number] [phone]  Dept: [phone]     Nehemiah Pisano MD  09/25/21 1412       Nehemiah Pisano MD  09/25/21 7554

## 2021-09-25 NOTE — H&P
"Admission History and Physical   Raf Dalton,  1950, MRN [id number]    [facility]  Pancreatic mass [K86.89]    PCP: No Ref-Primary, Physician, None   Code status:  Full Code       Extended Emergency Contact Information  Primary Emergency Contact: Elissa Dalton  Address: [address] [place]            [place] [address] [facility]  Home Phone: [phone]  Relation: Spouse  Secondary Emergency Contact: Emeterio Zamorano   [facility]  Home Phone: [phone]  Mobile Phone: [phone]  Relation: Friend       Assessment and Plan   Raf Dalton is a 71 year old male with recent diagnosis of pancreatic mass presents with    Painless jaundice  Elevated LFT  Pancreatic mass, most likely malignancy  -CT showed \"1. Increased moderate intra and extrahepatic bile duct dilatation to the level of the enlarging ill-defined pancreatic head mass.  2.  Mild mural thickening proximal duodenum adjacent to the pancreatic head mass has developed and the mild upper abdomen lymph node enlargement has increased.\"  -GI consultation  -npo, ivf, pain control prn    DM 2  -uncontrolled with hyperglycemia  -not on insulin at home  -insulin prn    Hyponatremia  -iv ns    Several small lung nodules stable  -outpt follow up with pcp    DVT Prophylaxis: high risk due to malignancy. No heparin due to pending procedure. SCD    Code status: Full    Disposition: Admitted inpatient. Anticipated Length of Stay in midnights (including a midnight in the Emergency Department after triage if applicable) is less than 2 nights      Chief Complaint:  fatigue, jaundice     HPI:    Raf Dalton is a 71 year old old male with a pertinent history of hypertension, COPD, hyperlipidemia, diverticulitis, DM type II who presents to  ED by EMS for evaluation of fatigue, jaundice.     The patient reports not feeling well over the past few onths. He describes having abdominal bloating, weakness, with associated 68# weight loss over the " past few months. The patient has been seen by his primary care provider and reportedly had a MRI 9/15/21 that revealed a pancreatic mass. Plans have been put in place for EUS +/- ERCP at [facility] 9/28/21 with Dr Daniels.    The patient reports having more generalized abdominal discomfort, pruritis, and jaundice over the past three days. Given these symptoms, he presented to the ER where total bilirubin was found to be elevated to 25.1. Lipase 87. WBCs are not elevated and Hgb is 12.4. CT abdomen and pelvis in the ER shows mod. Intra and extrahepatic bile duct dilation wth enlarging ill-defined pancreatic head mass. There is mild mural thickening in the proximal duodenum.    Gi was consulted in er and recommended to transfer pt to our hospital for admission and ERCP/stent placement.    History was obtained from pt/chart review.      Medical History  Past Medical History:   Diagnosis Date     Abdominal aneurysm without mention of rupture     Created by Conversion      Cervicalgia     Created by Conversion      Chest pain 08/19/2019     Chronic airway obstruction, not elsewhere classified     Created by Conversion      Diabetes mellitus, type II (H)      High cholesterol      Hypertrophy of prostate with urinary obstruction and other lower urinary tract symptoms (LUTS)     Created by Conversion      Hypertrophy of prostate without urinary obstruction and other lower urinary tract symptoms (LUTS)     Created by Conversion      Hypoxemia     Created by Conversion      Obstructive sleep apnea (adult) (pediatric)     Created by Conversion      Other abnormal glucose     Created by Conversion      Other and unspecified hyperlipidemia     Created by Conversion      Shortness of breath      Sprain of unspecified site of sacroiliac region     Created by Conversion      Unspecified cataract     Created by Conversion      Unspecified essential hypertension     Created by Conversion      Unspecified vitamin D deficiency     Created by  Conversion         Surgical History  He  has a past surgical history that includes Finger surgery and Abdominal Aortic Aneurysm Repair (04/09/2019).       Social History  Reviewed, and he  reports that he has been smoking cigarettes. He has a 27.50 pack-year smoking history. He has never used smokeless tobacco. He reports current alcohol use of about 21.0 standard drinks of alcohol per week. He reports that he does not use drugs.       Allergies  No Known Allergies Family History  Reviewed, and family history includes Snoring in his father.  Not pertinent to chief complaints or current medical problems.        Prior to Admission Medications   Medications Prior to Admission   Medication Sig Dispense Refill Last Dose     ACCU-CHEK TORI Misc [ACCU-CHEK TORI MISC]         ACCU-CHEK SMARTVIEW TEST STRIP strips [ACCU-CHEK SMARTVIEW TEST STRIP STRIPS] TEST BLOOD SUGAR TWICE DAILY 200 strip 3      acetaminophen (TYLENOL) 500 MG tablet Take 500-1,000 mg by mouth nightly as needed         albuterol (PROAIR HFA;PROVENTIL HFA;VENTOLIN HFA) 90 mcg/actuation inhaler [ALBUTEROL (PROAIR HFA;PROVENTIL HFA;VENTOLIN HFA) 90 MCG/ACTUATION INHALER] Inhale 2 puffs every 6 (six) hours as needed for wheezing. 1 each 6      albuterol (PROVENTIL) 2.5 mg /3 mL (0.083 %) nebulizer solution [ALBUTEROL (PROVENTIL) 2.5 MG /3 ML (0.083 %) NEBULIZER SOLUTION] Take 3 mL (2.5 mg total) by nebulization every 6 (six) hours as needed for wheezing or shortness of breath. 25 vial 2      aspirin 81 MG EC tablet [ASPIRIN 81 MG EC TABLET] Take 1 tablet (81 mg total) by mouth daily.  0      budesonide-formoterol (SYMBICORT) 80-4.5 mcg/actuation inhaler [BUDESONIDE-FORMOTEROL (SYMBICORT) 80-4.5 MCG/ACTUATION INHALER] Inhale 2 puffs 2 (two) times a day. 1 Inhaler 12      buPROPion (WELLBUTRIN SR) 150 MG 12 hr tablet [BUPROPION (WELLBUTRIN SR) 150 MG 12 HR TABLET] TAKE 1 TABLET TWICE DAILY 180 tablet 2      cholecalciferol, vitamin D3, (VITAMIN D3) 5,000 unit  Tab [CHOLECALCIFEROL, VITAMIN D3, (VITAMIN D3) 5,000 UNIT TAB] Take 1 tablet (5,000 Units total) by mouth daily. (Patient taking differently: Take 1,000 Units by mouth daily ) 90 tablet 3      cyanocobalamin 1000 MCG tablet [CYANOCOBALAMIN 1000 MCG TABLET] Take 1 tablet (1,000 mcg total) by mouth daily. 100 tablet 3      fluticasone propionate (FLONASE) 50 mcg/actuation nasal spray [FLUTICASONE PROPIONATE (FLONASE) 50 MCG/ACTUATION NASAL SPRAY] 1 spray into each nostril 2 (two) times a day as needed. 16 g 11      gabapentin (NEURONTIN) 300 MG capsule [GABAPENTIN (NEURONTIN) 300 MG CAPSULE] Take 3 capsules in the morning, 2 in the afternoon and 3 capsules in the evening (Patient taking differently: Take 900 mg by mouth 2 times daily ) 720 capsule 3      generic lancets (ACCU-CHEK FASTCLIX) [GENERIC LANCETS (ACCU-CHEK FASTCLIX)] USE TO TEST TWICE DAILY 200 each 3      ibuprofen (ADVIL/MOTRIN) 200 MG tablet Take 600 mg by mouth nightly as needed for mild pain        inhalational spacing device Spcr [INHALATIONAL SPACING DEVICE SPCR] Use two times a day with symbicort 1 each 0      ketoconazole (NIZORAL) 2 % cream [KETOCONAZOLE (NIZORAL) 2 % CREAM] ketoconazole 2 % topical cream   APPLY TO FUNGUS NAILS DAILY FOR 90 DAYS        lisinopriL (PRINIVIL,ZESTRIL) 20 MG tablet [LISINOPRIL (PRINIVIL,ZESTRIL) 20 MG TABLET] TAKE 1 TABLET TWICE DAILY FOR BLOOD PRESSURE 180 tablet 1      metFORMIN (GLUCOPHAGE-XR) 500 MG 24 hr tablet [METFORMIN (GLUCOPHAGE-XR) 500 MG 24 HR TABLET] TAKE 2 TABLETS TWICE DAILY 360 tablet 3      oxybutynin (DITROPAN) 5 MG tablet Take 2.5 mg by mouth 2 times daily         oxyCODONE (ROXICODONE) 5 MG tablet Take 5 mg by mouth every 6 hours as needed for severe pain        sildenafil (REVATIO) 20 mg tablet [SILDENAFIL (REVATIO) 20 MG TABLET] Take by mouth as needed.         3      simvastatin (ZOCOR) 40 MG tablet [SIMVASTATIN (ZOCOR) 40 MG TABLET] TAKE 1 AND 1/2 TABLETS EVERY DAY (Patient taking  differently: At Bedtime ) 135 tablet 3      terazosin (HYTRIN) 10 MG capsule [TERAZOSIN (HYTRIN) 10 MG CAPSULE] TAKE 2 CAPSULES AT BEDTIME 180 capsule 2      testosterone (ANDROGEL) 1.62 % (40.5 mg/2.5 gram) GlPk [TESTOSTERONE (ANDROGEL) 1.62 % (40.5 MG/2.5 GRAM) GLPK] Apply 1 pck daily 75 g 5      tiotropium (SPIRIVA WITH HANDIHALER) 18 mcg inhalation capsule [TIOTROPIUM (SPIRIVA WITH HANDIHALER) 18 MCG INHALATION CAPSULE] INHALE CONTENTS OF 1 CAPSULE DAILY 30 capsule 5      traZODone (DESYREL) 50 MG tablet [TRAZODONE (DESYREL) 50 MG TABLET] Take 1 tablet (50 mg total) by mouth at bedtime as needed for sleep. (Patient not taking: Reported on 8/16/2021) 90 tablet 1      TRULICITY 0.75 mg/0.5 mL PnIj [TRULICITY 0.75 MG/0.5 ML PNIJ] INJECT  0.75MG  UNDER  THE  SKIN EVERY 7 DAYS (Patient taking differently: Inject 0.75 mg Subcutaneous every 7 days mondays) 12 Syringe 3           Review of Systems:  A 12 point comprehensive review of systems was negative except as noted.    ROS  Physical Exam:  Temp:  [98.1  F (36.7  C)] 98.1  F (36.7  C)  Pulse:  [64-84] 73  Resp:  [16] 16  BP: (114-149)/(55-84) 142/77  SpO2:  [89 %-98 %] 98 %    There were no vitals taken for this visit.    Physical Exam  Vitals and nursing note reviewed.   Constitutional:       General: He is not in acute distress.     Appearance: Normal appearance. He is normal weight. He is not ill-appearing or toxic-appearing.   HENT:      Head: Normocephalic and atraumatic.      Right Ear: External ear normal.      Left Ear: External ear normal.      Nose: Nose normal.      Mouth/Throat:      Mouth: Mucous membranes are moist.   Eyes:      General: Scleral icterus present.         Right eye: No discharge.         Left eye: No discharge.      Extraocular Movements: Extraocular movements intact.      Pupils: Pupils are equal, round, and reactive to light.   Cardiovascular:      Rate and Rhythm: Normal rate and regular rhythm.      Pulses: Normal pulses.      Heart  sounds: No murmur heard.     Pulmonary:      Effort: Pulmonary effort is normal. No respiratory distress.      Breath sounds: Normal breath sounds.   Abdominal:      General: There is distension.      Palpations: Abdomen is soft.      Tenderness: There is no abdominal tenderness. There is no guarding.   Musculoskeletal:         General: No swelling or tenderness.      Cervical back: Normal range of motion and neck supple.      Right lower leg: No edema.      Left lower leg: No edema.   Skin:     General: Skin is warm and dry.      Coloration: Skin is jaundiced.   Neurological:      General: No focal deficit present.      Mental Status: He is alert. Mental status is at baseline.      Cranial Nerves: No cranial nerve deficit.   Psychiatric:         Mood and Affect: Mood normal.               Pertinent Labs  Lab Results: personally reviewed.   Results for RITO JOY (MRN [id number]) as of 9/25/2021 16:51   Ref. Range 9/25/2021 10:14 9/25/2021 11:41 9/25/2021 13:19   Sodium Latest Ref Range: 136 - 145 mmol/L 131 (L)     Potassium Latest Ref Range: 3.5 - 5.0 mmol/L 4.5     Chloride Latest Ref Range: 98 - 107 mmol/L 95 (L)     Carbon Dioxide Latest Ref Range: 22 - 31 mmol/L 22     Urea Nitrogen Latest Ref Range: 8 - 28 mg/dL 13     Creatinine Latest Ref Range: 0.70 - 1.30 mg/dL 1.29     GFR Estimate Latest Ref Range: >60 mL/min/1.73m2 55 (L)     Calcium Latest Ref Range: 8.5 - 10.5 mg/dL 9.5     Anion Gap Latest Ref Range: 5 - 18 mmol/L 14     Albumin Latest Ref Range: 3.5 - 5.0 g/dL 2.8 (L)     Protein Total Latest Ref Range: 6.0 - 8.0 g/dL 6.5     Bilirubin Total Latest Ref Range: 0.0 - 1.0 mg/dL 25.1 (HH)     Alkaline Phosphatase Latest Ref Range: 45 - 120 U/L 598 (H)     ALT Latest Ref Range: 0 - 45 U/L 160 (H)     AST Latest Ref Range: 0 - 40 U/L 105 (H)     Ammonia Latest Ref Range: 11 - 35 umol/L 36 (H)     Bilirubin Direct Latest Ref Range: <=0.5 mg/dL 16.4 (H)     Lipase Latest Ref Range: 0 - 52 U/L 87 (H)      Glucose Latest Ref Range: 70 - 125 mg/dL 146 (H)     WBC Latest Ref Range: 4.0 - 11.0 10e3/uL 6.1     Hemoglobin Latest Ref Range: 13.3 - 17.7 g/dL 12.4 (L)     Hematocrit Latest Ref Range: 40.0 - 53.0 % 35.7 (L)     Platelet Count Latest Ref Range: 150 - 450 10e3/uL 171     RBC Count Latest Ref Range: 4.40 - 5.90 10e6/uL 3.98 (L)     MCV Latest Ref Range: 78 - 100 fL 90     MCH Latest Ref Range: 26.5 - 33.0 pg 31.2     MCHC Latest Ref Range: 31.5 - 36.5 g/dL 34.7     RDW Latest Ref Range: 10.0 - 15.0 % 15.3 (H)     SARS CoV2 PCR Latest Ref Range: Negative    Negative       Pertinent Radiology  Radiology Results: Personally reviewed impression/s    EXAM: CT ABDOMEN PELVIS W CONTRAST  LOCATION: [facility]  DATE/TIME: 9/25/2021 11:39 AM     INDICATION: Pancreatic cancer. Jaundice.  COMPARISON: 8/31/2021                                                          IMPRESSION:   1.  Increased moderate intra and extrahepatic bile duct dilatation to the level of the enlarging ill-defined pancreatic head mass.  2.  Mild mural thickening proximal duodenum adjacent to the pancreatic head mass has developed and the mild upper abdomen lymph node enlargement has increased.  3.  Several small lung nodules stable.      Advanced Care Planning  Discharge planning discussed with patient        [facility]    Edita Layton MD  Office: [phone]

## 2021-09-25 NOTE — ED TRIAGE NOTES
1 week history of increasing jaundice and weakness.  I being worked up for pancreatic cancer.  Pt C/O constant upper abdominal pain over the last 3 months.  Took 1 oxycodone PTA.

## 2021-09-25 NOTE — ED NOTES
Report called to RN at [facility]. Patient will be admitted to room 2014. Updated family.

## 2021-09-25 NOTE — PLAN OF CARE
"PRIMARY DIAGNOSIS: \"GENERIC\" NURSING  OUTPATIENT/OBSERVATION GOALS TO BE MET BEFORE DISCHARGE:  ADLs back to baseline: Yes    Activity and level of assistance: Ambulating independently.    Pain status: Improved-controlled with oral pain medications.    Return to near baseline physical activity: Yes     Discharge Planner Nurse   Safe discharge environment identified: Yes  Barriers to discharge: Yes, ERCP schedule for 9/26/21       Entered by: Jimmy Austin 09/25/2021 6:24 PM     Please review provider order for any additional goals.   Nurse to notify provider when observation goals have been met and patient is ready for discharge.  "

## 2021-09-26 PROBLEM — R17 JAUNDICE: Status: ACTIVE | Noted: 2021-01-01

## 2021-09-26 NOTE — PLAN OF CARE
PRIMARY DIAGNOSIS: abdominal pain  OUTPATIENT/OBSERVATION GOALS TO BE MET BEFORE DISCHARGE:  ADLs back to baseline: Yes    Activity and level of assistance: Ambulating independently.    Pain status: Improved but still requiring IV narcotics.    Return to near baseline physical activity: Yes     Discharge Planner Nurse   Safe discharge environment identified: Yes  Barriers to discharge: Yes- pt to ERCP with stenting       Entered by: Seda Bethea 09/26/2021 1:51 PM     Please review provider order for any additional goals.   Nurse to notify provider when observation goals have been met and patient is ready for discharge.

## 2021-09-26 NOTE — PROGRESS NOTES
[facility] Hospitalist Progress Note  [facility]  Summary:    71M with HTN, COPD, HLD, DM2, recently diagnosed with mass in the head of the pancreas who presented with abdominal discomfort, pruritis and jaundice and found to have worsening biliary obstruction.      Assessment/Plan    #Pancreatic mass with biliary obstuction -  -EUS and ERCP today for stent and tissue dx  -npo, ivf, pain control prn     #DM2 -   -home regimen: metofrmin, truclicity  -inpatient: sliding scale insulin. Hold oral agents for now.    #Hyponatremia - likely hypovolemic monitor with IVF     #CKD3 - monitor    #COPD - spiriva, symbicort, albuterol    #mood disorder - wellbutrin    #Several small lung nodules - ?related to #1.  Will likely need oncology evaluation in the near future when path results back.     DVT Prophylaxis: high risk due to malignancy. No heparin due to pending procedure. SCD    Checklist:  Code Status: Full Code    Diet: NPO for Medical/Clinical Reasons Except for: Ice Chips, Meds    Quezada Catheter: Not present  Central Lines: None  DVT px:  SCD      Expected discharge:  Possibly as early as tomorrow    Overnight Events/Subjective/Notable results:    Abdominal pain, not great relief with dilaudid 0.2mg, improved after 0.4mg x 1  Frustrated procedure hasn't happened yet  Await AM labs    4 point ROS otherwise negative    Objective    Vital signs in last 24 hours  Temp:  [98.1  F (36.7  C)-98.8  F (37.1  C)] 98.1  F (36.7  C)  Pulse:  [64-84] 71  Resp:  [16-20] 16  BP: (114-149)/(55-84) 139/78  SpO2:  [89 %-98 %] 93 % O2 Device: None (Room air)    Weight:   185 lbs 4.8 oz    Intake/Output last 3 shifts  I/O last 3 completed shifts:  In: 1008 [I.V.:1008]  Out: 875 [Urine:875]  Body mass index is 28.17 kg/m .    Physical Exam  General:  Alert, cooperative, a bit cantankerous, no distress,  Appears stated age  Neurologic:  oriented, facialsymmetry preserved, fluent speech.   Psych: calm, mood and affect  appropriate to situation  HEENT:  icterus, MMM, unremarkable dentition  CV: RRR no MRG, normal S1 and S2, no edema  Lungs: CTAB.  Easyrespirations  Abd: soft  Skin: jaundice  Central Lines and Tubes: None (no bell, CVC, feeding tubes)      I have reviewed all labs, medications, imaging studies in the last 24 hours.  Pertinent findings&changes discussed above.    Data     Discussed with: family in room    Mynor Jones MD  Internal Medicine Hospitalist

## 2021-09-26 NOTE — ANESTHESIA CARE TRANSFER NOTE
Patient: Raf Dalton    Procedure(s):  ESOPHAGOGASTRODUODENOSCOPY, WITH ENDOSCOPIC US, fine needle aspiration OF PANCREATIC HEA D MASS  ENDOSCOPIC RETROGRADE CHOLANGIOPANCREATOGRAPHY, BILIARY SPHINCTEROTOMY,    Diagnosis: Biliary obstruction [K83.1]  Diagnosis Additional Information: No value filed.    Anesthesia Type:   No value filed. GA  Note:    Oropharynx: oropharynx clear of all foreign objects  Level of Consciousness: drowsy and awake  Oxygen Supplementation: face mask  Level of Supplemental Oxygen (L/min / FiO2): 10  Independent Airway: airway patency satisfactory and stable  Dentition: dentition unchanged  Vital Signs Stable: post-procedure vital signs reviewed and stable  Report to RN Given: handoff report given  Patient transferred to: PACU    Handoff Report: Identifed the Patient, Identified the Reponsible Provider, Reviewed the pertinent medical history, Discussed the surgical course, Reviewed Intra-OP anesthesia mangement and issues during anesthesia, Set expectations for post-procedure period and Allowed opportunity for questions and acknowledgement of understanding      Vitals:  Vitals Value Taken Time   /61 09/26/21 1500   Temp 97.1 f      Pulse 93 09/26/21 1503   Resp 13 09/26/21 1503   SpO2 95 % 09/26/21 1503   Vitals shown include unvalidated device data.    Electronically Signed By: SANKET Goetz CRNA  September 26, 2021  3:04 PM

## 2021-09-26 NOTE — H&P
GENERAL PRE-PROCEDURE:   Procedure:  EUS/ERCP - Pancreatic Mass with Biliary Obstruction   Date/Time:  9/26/2021 12:23 PM    Verbal consent obtained?: Yes    Written consent obtained?: Yes    Risks and benefits: Risks, benefits and alternatives were discussed    Consent given by:  Patient  Patient states understanding of procedure being performed: Yes    Patient's understanding of procedure matches consent: Yes    Procedure consent matches procedure scheduled: Yes    Expected level of sedation:  Deep  Appropriately NPO:  Yes  ASA Class:  3  Mallampati  :  Grade 2- soft palate, base of uvula, tonsillar pillars, and portion of posterior pharyngeal wall visible  Lungs:  Lungs clear with good breath sounds bilaterally  Heart:  Normal heart sounds and rate  History & Physical reviewed:  History and physical reviewed and no updates needed  Statement of review:  I have reviewed the lab findings, diagnostic data, medications, and the plan for sedation

## 2021-09-26 NOTE — PLAN OF CARE
"PRIMARY DIAGNOSIS: \"GENERIC\" NURSING  OUTPATIENT/OBSERVATION GOALS TO BE MET BEFORE DISCHARGE:  ADLs back to baseline: Yes    Activity and level of assistance: Ambulating independently.    Pain status: Improved but still requiring IV narcotics.    Return to near baseline physical activity: Yes     Discharge Planner Nurse   Safe discharge environment identified: Yes  Barriers to discharge: No, planned ERCP 9/26/21       Entered by: Jimmy Austin 09/25/2021 10:26 PM     Please review provider order for any additional goals.   Nurse to notify provider when observation goals have been met and patient is ready for discharge.  "

## 2021-09-26 NOTE — ANESTHESIA PREPROCEDURE EVALUATION
Anesthesia Pre-Procedure Evaluation    Patient: Raf Dalton   MRN: [id number] : 1950        Preoperative Diagnosis: Biliary obstruction [K83.1]   Procedure : Procedure(s):  ESOPHAGOGASTRODUODENOSCOPY, WITH ENDOSCOPIC US  ENDOSCOPIC RETROGRADE CHOLANGIOPANCREATOGRAPHY     Past Medical History:   Diagnosis Date     Abdominal aneurysm without mention of rupture     Created by Conversion      Cervicalgia     Created by Conversion      Chest pain 2019     Chronic airway obstruction, not elsewhere classified     Created by Conversion      Diabetes mellitus, type II (H)      High cholesterol      Hypertrophy of prostate with urinary obstruction and other lower urinary tract symptoms (LUTS)     Created by Conversion      Hypertrophy of prostate without urinary obstruction and other lower urinary tract symptoms (LUTS)     Created by Conversion      Hypoxemia     Created by Conversion      Obstructive sleep apnea (adult) (pediatric)     Created by Conversion      Other abnormal glucose     Created by Conversion      Other and unspecified hyperlipidemia     Created by Conversion      Shortness of breath      Sprain of unspecified site of sacroiliac region     Created by Conversion      Unspecified cataract     Created by Conversion      Unspecified essential hypertension     Created by Conversion      Unspecified vitamin D deficiency     Created by Conversion       Past Surgical History:   Procedure Laterality Date     ABDOMINAL AORTIC ANEURYSM REPAIR  2019     FINGER SURGERY        No Known Allergies   Social History     Tobacco Use     Smoking status: Current Some Day Smoker     Packs/day: 0.50     Years: 55.00     Pack years: 27.50     Types: Cigarettes     Last attempt to quit: 10/18/2017     Years since quitting: 3.9     Smokeless tobacco: Never Used     Tobacco comment: .5PPD   Substance Use Topics     Alcohol use: Yes     Alcohol/week: 21.0 standard drinks     Comment: Alcoholic Drinks/day: 21  beers per week      Wt Readings from Last 1 Encounters:   09/25/21 84.1 kg (185 lb 4.8 oz)        Anesthesia Evaluation   Pt has had prior anesthetic.         ROS/MED HX  ENT/Pulmonary: Comment: bronchitis    (+) sleep apnea, uses CPAP, tobacco use, Current use, 60  Pack-Year Hx,  Moderate Persistent, asthma moderate,  COPD,     Neurologic:  - neg neurologic ROS     Cardiovascular: Comment: AAA    (+) Dyslipidemia hypertension-----    METS/Exercise Tolerance:     Hematologic:       Musculoskeletal:       GI/Hepatic: Comment: Pancreatic mass    (+) liver disease,  (-) hepatitis   Renal/Genitourinary:     (+) BPH,     Endo:     (+) type II DM, Using insulin,  (-) Type I DM and obesity   Psychiatric/Substance Use: Comment: 21 alcoholic beverages/week      Infectious Disease:  - neg infectious disease ROS     Malignancy: Comment: Biliary obstruction  Likely pancreatic CA  (+) Malignancy, History of Other and GI.Other CA pancreatic status post.    Other:  - neg other ROS          Physical Exam    Airway  airway exam normal      Mallampati: II   TM distance: > 3 FB   Neck ROM: full   Mouth opening: > 3 cm    Respiratory Devices and Support         Dental     Comment: Upper front teeth missing    (+) missing      Cardiovascular   cardiovascular exam normal       Rhythm and rate: regular and normal     Pulmonary   pulmonary exam normal        breath sounds clear to auscultation           OUTSIDE LABS:  CBC:   Lab Results   Component Value Date    WBC 6.1 09/25/2021    WBC 7.4 03/16/2020    HGB 12.4 (L) 09/25/2021    HGB 14.8 03/16/2020    HCT 35.7 (L) 09/25/2021    HCT 43.3 03/16/2020     09/25/2021     03/16/2020     BMP:   Lab Results   Component Value Date     (L) 09/25/2021     08/02/2021    POTASSIUM 4.5 09/25/2021    POTASSIUM 4.0 08/02/2021    CHLORIDE 95 (L) 09/25/2021    CHLORIDE 103 08/02/2021    CO2 22 09/25/2021    CO2 28 08/02/2021    BUN 13 09/25/2021    BUN 9 08/02/2021    CR 1.29  09/25/2021    CR 1.11 08/02/2021     (H) 09/26/2021     (H) 09/26/2021     COAGS:   Lab Results   Component Value Date    INR 1.13 09/25/2021     POC: No results found for: BGM, HCG, HCGS  HEPATIC:   Lab Results   Component Value Date    ALBUMIN 2.8 (L) 09/25/2021    PROTTOTAL 6.5 09/25/2021     (H) 09/25/2021     (H) 09/25/2021    ALKPHOS 598 (H) 09/25/2021    BILITOTAL 25.1 (HH) 09/25/2021    SHELIA 36 (H) 09/25/2021     OTHER:   Lab Results   Component Value Date    A1C 5.4 09/25/2021    MAXIMUS 9.5 09/25/2021    MAG 2.2 02/01/2021    LIPASE 87 (H) 09/25/2021    TSH 0.81 04/22/2021       Anesthesia Plan    ASA Status:  3, emergent       Anesthesia Type: General.     - Airway: ETT   Induction: Intravenous.   Maintenance: Balanced.        Consents         - Extended Intubation/Ventilatory Support Discussed: No.    Use of blood products discussed: Yes.     Postoperative Care    Pain management: IV analgesics, Oral pain medications.   PONV prophylaxis: Ondansetron (or other 5HT-3), Dexamethasone or Solumedrol     Comments:                Shavonne Arriaga MD

## 2021-09-26 NOTE — ANESTHESIA POSTPROCEDURE EVALUATION
Patient: Raf Dalton    Procedure(s):  ESOPHAGOGASTRODUODENOSCOPY, WITH ENDOSCOPIC US, fine needle aspiration OF PANCREATIC HEA D MASS  ENDOSCOPIC RETROGRADE CHOLANGIOPANCREATOGRAPHY, BILIARY SPHINCTEROTOMY,    Diagnosis:Biliary obstruction [K83.1]  Diagnosis Additional Information: No value filed.    Anesthesia Type:  General    Note:  Disposition: Inpatient   Postop Pain Control: Uneventful            Sign Out: Well controlled pain   PONV: No   Neuro/Psych: Uneventful            Sign Out: Acceptable/Baseline neuro status   Airway/Respiratory: Uneventful            Sign Out: Acceptable/Baseline resp. status   CV/Hemodynamics: Uneventful            Sign Out: Acceptable CV status; No obvious hypovolemia; No obvious fluid overload   Other NRE: NONE   DID A NON-ROUTINE EVENT OCCUR? No           Last vitals:  Vitals Value Taken Time   /64 09/26/21 1530   Temp 36.4  C (97.5  F) 09/26/21 1500   Pulse 64 09/26/21 1542   Resp 12 09/26/21 1542   SpO2 94 % 09/26/21 1542   Vitals shown include unvalidated device data.    Electronically Signed By: Shavonne Arriaga MD  September 26, 2021  3:43 PM

## 2021-09-26 NOTE — PROGRESS NOTES
"PRIMARY DIAGNOSIS: \"GENERIC\" NURSING  OUTPATIENT/OBSERVATION GOALS TO BE MET BEFORE DISCHARGE:  1. ADLs back to baseline: No    2. Activity and level of assistance: Up with standby assistance.    3. Pain status: Improved but still requiring IV narcotics.    4. Return to near baseline physical activity: No     Discharge Planner Nurse   Safe discharge environment identified: Yes  Barriers to discharge: Yes       Entered by: DAVID GRECO 09/26/2021 4:04 AM     Please review provider order for any additional goals.   Nurse to notify provider when observation goals have been met and patient is ready for discharge.  "

## 2021-09-26 NOTE — PROGRESS NOTES
"PRIMARY DIAGNOSIS: \"GENERIC\" NURSING  OUTPATIENT/OBSERVATION GOALS TO BE MET BEFORE DISCHARGE:  1. ADLs back to baseline: Yes    2. Activity and level of assistance: Ambulating independently.    3. Pain status: Improved but still requiring IV narcotics.    4. Return to near baseline physical activity: No     Discharge Planner Nurse   Safe discharge en  vironment identified: Yes  Barriers to discharge: Yes       Entered by: DAVID GRECO 09/26/2021 3:42 AM     Please review provider order for any additional goals.   Nurse to notify provider when observation goals have been met and patient is ready for discharge.  "

## 2021-09-26 NOTE — CONSULTS
"GI CONSULT NOTE        Name: Raf Dalton  Medical Record #: [id number]  YOB: 1950  Date of Admission: 9/25/2021  Date/Time: 9/25/2021/1:23 PM     CHIEF COMPLAINT: Jaundice     HISTORY OF PRESENT ILLNESS: We were asked to see Raf Dalton by Dr Rueda for evaluation of abnormal liver tests and concerns for biliary obstruction.   Raf Dalton is a 71 year old year old male with history of COPD, lung nodules, ongoing tobacco use, JOSE, AAA repair, lung nodules, and heavy alcohol use who presented to the ER for further evaluation of jaundice. The patient reports not feeling well over the past few onths. He describes having abdominal bloating, weakness, with associated 68# weight loss over the past few months. The patient has been seen by his primary care provider and reportedly had a MRI 9/15/21 that revealed a pancreatic mass. Plans have been put in place for EUS +/- ERCP at [facility] 9/28/21 with Dr Daniels.  The patient reports having more generalized abdominal discomfort, pruritis, and jaundice over the past three days. Given these symptoms, he presented to the ER where total bilirubin was found to be elevated to 25.1. Lipase 87. WBCs are not elevated and Hgb is 12.4. CT abdomen and pelvis in the ER shows mod. Intra and extrahepatic bile duct dilation wth enlarging ill-defined pancreatic head mass. There is mild mural thickening in the proximal duodenum.     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 His two sisters are at the bedside.   He smokes cigarettes. He admits to heavy alcohol use (6beers per day), but has cut back over the past few weeks due to not feeling well. \"I can't even finish a beer.\" He denies having withdrawal symptoms.   Colonoscopy 9/27/18 revealed internal hemorrhoids, diverticulosis, and tubular adenoma X2 removed. Recommended to have a repeat colonoscopy in 3 years (scheduled for 11/2021).     REVIEW OF SYSTEMS (ROS): Complete review of systems negative other than listed in " HPI.     PAST MEDICAL HISTORY:  Past Medical History        Past Medical History:   Diagnosis Date     Abdominal aneurysm without mention of rupture       Created by Conversion      Cervicalgia       Created by Conversion      Chest pain 08/19/2019     Chronic airway obstruction, not elsewhere classified       Created by Conversion      Diabetes mellitus, type II (H)       High cholesterol       Hypertrophy of prostate with urinary obstruction and other lower urinary tract symptoms (LUTS)       Created by Conversion      Hypertrophy of prostate without urinary obstruction and other lower urinary tract symptoms (LUTS)       Created by Conversion      Hypoxemia       Created by Conversion      Obstructive sleep apnea (adult) (pediatric)       Created by Conversion      Other abnormal glucose       Created by Conversion      Other and unspecified hyperlipidemia       Created by Conversion      Shortness of breath       Sprain of unspecified site of sacroiliac region       Created by Conversion      Unspecified cataract       Created by Conversion      Unspecified essential hypertension       Created by Conversion      Unspecified vitamin D deficiency       Created by Conversion             FAMILY HISTORY:  Family History         Family History   Problem Relation Age of Onset     Snoring Father                    MEDICATIONS PRIOR TO ADMISSION:   Prescriptions Prior to Admission   (Not in a hospital admission)           ALLERGIES: Patient has no known allergies.     PHYSICAL EXAM:     BP (!) 141/84   Pulse 68   Temp 98.1  F (36.7  C) (Oral)   Resp 16   Wt 85.5 kg (188 lb 6.4 oz)   SpO2 95%   BMI 28.23 kg/m       GENERAL: Pleasant, no obvious distress, +jaundice,  NECK: Supple without adenopathy  EYES: scleral icterus  LUNGS: Clear to auscultation bilaterally  HEART: S1S2, no lower extremity edema  ABDOMEN: Non-distended. Positive bowel sounds. Soft, non-tender, no guarding/rebound  MUSKULOSKELETAL:  Warm and well  perfused, moves all extremities well  NEUROLOGIC: Alert and oriented  PSYCHIATRIC: Normal affect     LAB DATA:  CMP Results:           Recent Labs   Lab Test 09/25/21  1014 08/02/21  0936 06/02/21  0926 02/01/21  0959 02/01/21  0959   * 140 142   < > 141   POTASSIUM 4.5 4.0 4.3   < > 4.2   CHLORIDE 95* 103 103   < > 104   CO2 22 28 30   < > 28   ANIONGAP 14 9 9   < > 9   * 152* 93   < > 99   BUN 13 9 14   < > 14   CR 1.29 1.11 1.06   < > 1.15   BILITOTAL 25.1* 0.4  --   --  0.7   ALKPHOS 598* 84  --   --  71   * 12  --   --  12   * 13  --   --  14    < > = values in this interval not displayed.      CBC  Recent Labs   Lab 09/25/21  1014   WBC 6.1   RBC 3.98*   HGB 12.4*   HCT 35.7*   MCV 90   MCH 31.2   MCHC 34.7   RDW 15.3*         INRNo lab results found in last 7 days.   Lipase   Date Value Ref Range Status   09/25/2021 87 (H) 0 - 52 U/L Final   08/19/2019 18 0 - 52 U/L Final         IMAGING:  EXAM: CT ABDOMEN PELVIS W CONTRAST  LOCATION: [facility]  DATE/TIME: 9/25/2021 11:39 AM     INDICATION: Pancreatic cancer. Jaundice.  COMPARISON: 8/31/2021  TECHNIQUE: CT scan of the abdomen and pelvis was performed following injection of IV contrast. Multiplanar reformats were obtained. Dose reduction techniques were used.  CONTRAST: Isovue-370 75 mL IV     FINDINGS:   LOWER CHEST: A 5 mm right middle lobe nodule and 4 mm right lower lobe nodule (image 1) are unchanged.     HEPATOBILIARY: Increased moderate intra and extrahepatic bile duct dilatation to the level of the ill-defined pancreatic head mass. Mild gallbladder distention. No calcified gallstones. Moderate diffuse hepatic steatosis. No focal liver lesions.     PANCREAS: Ill-defined pancreatic head mass has increased from approximate dimensions of 4.5 x 3.5 cm to 5.0 x 4.5 cm and the associated distal parenchymal atrophy and ductal dilatation is unchanged.     SPLEEN: Normal.     ADRENAL GLANDS:  Normal.     KIDNEYS/BLADDER: Mild chronic parenchymal atrophy right kidney unchanged.     BOWEL: Mild mural thickening proximal duodenal segments adjacent to the pancreatic head mass has developed.     LYMPH NODES: Mild peripancreatic, portacaval and maddy hepatis lymphadenopathy has increased.     VASCULATURE: AAA stent graft unchanged.     PELVIC ORGANS: Normal.     MUSCULOSKELETAL: Normal.                                                                      IMPRESSION:   1.  Increased moderate intra and extrahepatic bile duct dilatation to the level of the enlarging ill-defined pancreatic head mass.  2.  Mild mural thickening proximal duodenum adjacent to the pancreatic head mass has developed and the mild upper abdomen lymph node enlargement has increased.  3.  Several small lung nodules stable.     ASSESSMENT:  Biliary obstruction, consistent with pancreatic head mass as seen on CT-- This is a pleasant 71 year old year old male with history of COPD, lung nodules, ongoing tobacco use, JOSE, AAA repair, lung nodules, and heavy alcohol use who presented to the ER for further evaluation of jaundice. He reports not feeling well over the past few months and reports having a 68# weight loss as well. Outpatient evaluation included a MRI (I do not have records) 9/15/21 that revealed a pancreatic mass as also seen on imaging/CT abdomen and pelvis earlier today. Suspect biliary obstruction and therefore recommend proceeding with EUS/ERCP. The patient will need to transfer to [facility] for further evaluation.      The patient reports heavy alcohol use, but recently cut back over the past few weeks. Monitor for etoh withdrawal.  I encouraged the patient to stop smoking cigarettes.   PLAN:  Covid test is pending.   Add on INR. Pain control.   NPO. Plans for transfer to [facility] for EUS/ERCP are underway. Will tentatively plan for eus/ercp 9/26/21 at 10AM.     Discussed with Dr. Garza who will also visit with the patient.  "                                                Aura Villavicencio PA-C  Thank you for the opportunity to participate in the care of this patient.   Please feel free to call me with any questions or concerns.  Phone number [phone].               Gastroenterology Consult in Conjunction with Advanced Practice Provider   The patient was seen and evaluated in conjunction with the mid-level provider. Please see their note for details. All labs and imaging studies have been reviewed.     Objective     Vitals Blood pressure 139/78, pulse 71, temperature 98.1  F (36.7  C), temperature source Oral, resp. rate 16, height 1.727 m (5' 8\"), weight 84.1 kg (185 lb 4.8 oz), SpO2 93 %.          Physical Exam   General: awake, alert, oriented times three, icteric    Cardiovascular: RRR, no edema    Chest: lungs are clear to auscultation bilaterally    Abdomen: soft, mild tenderness to palpation, non-distended, bowel sounds present    Neurologic: grossly intact, moves all four extremities         Laboratory     Electrolytes    Recent Labs   Lab 09/26/21  0607 09/26/21  0351 09/26/21  0026 09/25/21  1706 09/25/21  1014   NA  --   --   --   --  131*   POTASSIUM  --   --   --   --  4.5   CHLORIDE  --   --   --   --  95*   CO2  --   --   --   --  22   * 113* 119*   < > 146*   CR  --   --   --   --  1.29   BUN  --   --   --   --  13    < > = values in this interval not displayed.      Hematology    Recent Labs   Lab 09/25/21  1332 09/25/21  1014   HGB  --  12.4*   MCV  --  90   WBC  --  6.1   PLT  --  171   INR 1.13  --       LFTs & Lipase    Recent Labs   Lab 09/25/21  1014   *   *   ALKPHOS 598*   BILITOTAL 25.1*   LIPASE 87*       Imaging Studies     Abdominal CT scan from September 25, 2021  1.  Increased moderate intra and extrahepatic bile duct dilatation to the level of the enlarging ill-defined pancreatic head mass.  2.  Mild mural thickening proximal duodenum adjacent to the pancreatic head mass has " developed and the mild upper abdomen lymph node enlargement has increased.  3.  Several small lung nodules stable.    I have reviewed the current diagnostic and laboratory tests.           Impression    Raf Dalton is a pleasant 71 year old male with a newly discovered large pancreatic head mass with biliary obstruction.  The patient was scheduled for EUS ERCP later this week but came to the ER with worsening symptoms and more fatigue.     Recommendations      Pancreatic mass with biliary obstruction   Endoscopic ultrasound tomorrow morning with biopsies for diagnostic purposes.  ERCP to follow to drain the bile duct if possible.  N.p.o. after midnight tonight.           Gabriel Garza MD  Thank you for the opportunity to participate in the care of this patient.   Please feel free to call me with any questions or concerns.  Phone number [phone].

## 2021-09-26 NOTE — ANESTHESIA PROCEDURE NOTES
Airway       Patient location during procedure: OR       Procedure Start/Stop Times: 9/26/2021 1:35 PM  Staff -        Performed By: CRNA  Consent for Airway        Urgency: elective  Indications and Patient Condition       Indications for airway management: evelina-procedural       Induction type:intravenous       Mask difficulty assessment: 1 - vent by mask    Final Airway Details       Final airway type: endotracheal airway       Successful airway: ETT - single  Endotracheal Airway Details        ETT size (mm): 7.5       Cuffed: yes       Cuff volume (mL): 6       Successful intubation technique: direct laryngoscopy       DL Blade Type: MAC 3       Grade View of Cords: 1       Adjucts: stylet and tooth guard       Position: Right       Measured from: lips       Secured at (cm): 22       Bite block used: Soft    Post intubation assessment        Placement verified by: capnometry, equal breath sounds and chest rise        Number of attempts at approach: 1       Secured with: silk tape       Ease of procedure: easy       Dentition: Intact

## 2021-09-26 NOTE — PLAN OF CARE
VSS, A&Ox4,verbalized adequate pain control with 0.2mg IV dilaudid. Denies nausea. Steady gait when oob. ERCP later today. NPO since midnight. Continue to monitor

## 2021-09-27 NOTE — PROGRESS NOTES
Pt. Discharged to home. Discharge paperwork reviewed with patient. Najmaaubrey Crews updated with pt. Discharging home and she stated that Dr. Garza will contact pt. For a follow up.   Lab called and stated that the CEA and Amylase labs from the specimen sent during the ERCP could not be done, Dr. Mendenhall office updated.     Genesis Saldivar RN

## 2021-09-27 NOTE — PROGRESS NOTES
"CLINICAL NUTRITION SERVICES - ASSESSMENT NOTE     Nutrition Prescription    RECOMMENDATIONS FOR MDs/PROVIDERS TO ORDER:  none    Malnutrition Status:    severe    Recommendations already ordered by Registered Dietitian (RD):  Recommend adding glucerna when diet advances twice   Day with meals    Future/Additional Recommendations:  Follow for diet adv/po intake/suppl intake and need for adjustments     REASON FOR ASSESSMENT  Raf Dalton is a/an 71 year old male assessed by the dietitian for Admission Nutrition Risk Screen for positive weight loss and poor po intake PTA    Pt with past medical history of hypertension, COPD, HLD, DM2 recently diagnosed with mass in the head of the pancreas who presented with abdominal discomfort, pruritis and jaundice and found to have worsening biliary obstruction. He also has CKD3 and several small lung nodules      NUTRITION HISTORY  Pt states he he has not been eating well for > 7 days.  He does not take any nutritional supplements at home but is willing to try the ensure enlive when his diet advances (due to DM will send glucerna).  He states he weighed 256 lb ~ 1 year ago or less    CURRENT NUTRITION ORDERS  Diet: Clear Liquid  Intake/Tolerance: 100% (9/26)  Pt is requesting more substantial food    LABS  Labs reviewed: K 4.1, urea nitrogen 13, Cr 0.83, Alb 2.5, T. Bili 12.9, Glu 152    MEDICATIONS  Medications reviewed: novolog    ANTHROPOMETRICS  Height: 172.7 cm (5' 8\")  Most Recent Weight: 84.1 kg (185 lb 4.8 oz)    IBW: 70 kg (154 lb)  BMI: Overweight BMI 25-29.9  Weight History:   Wt Readings from Last 15 Encounters:   09/25/21 84.1 kg (185 lb 4.8 oz)   09/25/21 85.5 kg (188 lb 6.4 oz)   08/16/21 98 kg (216 lb)   05/20/21 99.3 kg (219 lb)   10/29/20 99.8 kg (220 lb)   05/13/20 98.4 kg (217 lb)   03/16/20 98.4 kg (217 lb)   11/04/19 103.2 kg (227 lb 7 oz)   08/23/19 105.7 kg (233 lb)   05/21/19 110.2 kg (243 lb)   04/22/19 106.1 kg (233 lb 12.8 oz)   04/17/19 104.9 kg (231 " lb 4 oz)   04/10/19 106.1 kg (234 lb)   04/03/19 107 kg (236 lb)   04/01/19 105.4 kg (232 lb 7 oz)   Per above results, pt has lost 31 lb in the past month (13.9%) which is clinically significant and severe    Dosing Weight: 84.1 kg/ current weight    ASSESSED NUTRITION NEEDS  Estimated Energy Needs: 7503-6270 kcals/day (25 - 30 kcals/kg)  Justification: Maintenance  Estimated Protein Needs: 101-126 grams protein/day (1.2 - 1.5 grams of pro/kg)  Justification: Repletion  Estimated Fluid Needs: 3437-2651 mL/day (25 - 30 mL/kg)   Justification: Maintenance    PHYSICAL FINDINGS  See malnutrition section below.    MALNUTRITION  % Intake: </=75% for >/= 1 month (severe)  % Weight Loss: > 5% in 1 month (severe)  Subcutaneous Fat Loss: None observed  Muscle Loss: Upper leg (quadricep, hamstring):  moderate  Fluid Accumulation/Edema: None noted  Malnutrition Diagnosis: Severe malnutrition in the context of acute illness    NUTRITION DIAGNOSIS  Malnutrition related to acute illness as evidenced by inadequate oral intake </= 75% for >/=1 month, 13.9% weight loss in the past month and moderate muscle loss on legs      INTERVENTIONS  When diet advances will add glucerna supplements bid to meal trays and follow po/supplement intake and any adjustments needed    Goals  Patient to consume % of nutritionally adequate meals three times per day, or the equivalent with supplements/snacks.     Monitoring/Evaluation  Progress toward goals will be monitored and evaluated per protocol.

## 2021-09-27 NOTE — PROGRESS NOTES
"GI PROGRESS NOTE  9/27/2021  Raf Dalton  1950  /-04    Subjective:   Currently reports 6/10 abdominal pain. Not sure if he feels any different than yesterday. Tolerating clear liquids.      Objective:     Blood pressure (!) 161/85, pulse 69, temperature 98.3  F (36.8  C), temperature source Oral, resp. rate 16, height 1.727 m (5' 8\"), weight 84.1 kg (185 lb 4.8 oz), SpO2 96 %.    Body mass index is 28.17 kg/m .  Gen: NAD  Skin: Jaundiced throughout   GI: Non-distended, BS positive, soft, LLQ and RLQ tenderness  Neuro: Alert and orientated     Laboratory:  CMP Results:   Recent Labs   Lab Test 09/27/21  0801   *   POTASSIUM 4.1   CHLORIDE 103   CO2 24   ANIONGAP 8   *   BUN 13   CR 0.83   BILITOTAL 12.9*   ALKPHOS 436*   *   AST 54*      CBC  Recent Labs   Lab 09/25/21  1014   WBC 6.1   RBC 3.98*   HGB 12.4*   HCT 35.7*   MCV 90   MCH 31.2   MCHC 34.7   RDW 15.3*        INR  Recent Labs   Lab 09/25/21  1332   INR 1.13      Lipase   Date Value Ref Range Status   09/25/2021 87 (H) 0 - 52 U/L Final   08/19/2019 18 0 - 52 U/L Final     Procedures:  ERCP 9/26/21:  Impression:          - A single segmental biliary stricture was found in the                        lower third of the main bile duct. The stricture was                        malignant appearing.                        - The upper third of the main bile duct and middle third                        of the main bile duct were dilated, with a mass causing                        an obstruction.                        - A biliary sphincterotomy was performed.                        - One covered metal stent was placed into the common                        bile duct.    EUS 9/26/21   Impression:          - A mass was identified in the pancreatic head. This was                        staged T3. The staging applies if malignancy is                        confirmed. Fine needle aspiration performed.                        - " There was dilation in the common bile duct and in the                        common hepatic duct which measured up to 18 mm.     Assessment:   1. Pancreatic head mass  71 year-old male with weight loss, abdominal pain, elevated LFTs with imaging findings of pancreatic mass now s/p EUS with FNA of mass and ERCP with biliary sphincterotomy and stent placement. LFTs have significantly improved. We will await pathology.    Abdominal pain is in bilateral lower quadrants, suspect constipation from opioids. Will add MirALAX daily. Continue clear liquids until 24 hours post procedure, then advance as tolerated.      Plan:   1. MiraLAX 17g daily  2. Clear liquid diet until 3pm today, then advance diet as tolerated  3. We will follow-up pathology  4. Ok from GI standpoint for discharge home once tolerating diet with oral pain meds.                                                                        Najma Rey PA-C  Thank you for the opportunity to participate in the care of this patient.   Please feel free to call me with any questions or concerns.  Phone number [phone].

## 2021-09-27 NOTE — UTILIZATION REVIEW
"Admission Status; Secondary Review Determination         Under the authority of the Utilization Management Committee, the utilization review process indicated a secondary review on the above patient.  The review outcome is based on review of the medical records, discussions with staff, and applying clinical experience noted on the date of the review.          (x) Observation Status Appropriate - This patient does not meet hospital inpatient criteria and is placed in observation status. If this patient's primary payer is Medicare and was admitted as an inpatient, Condition Code 44 should be used and patient status changed to \"observation\".       RATIONALE FOR DETERMINATION     Mr. Dalton is a 72 yo male pt who presents to the ED with painless jaundice and elevated LFT's.  CT revealed pancreatic mass.  GI was consulted; he underwent EUS and fine needle biopsy of the mass and ERCP with biliary sphinctereomy and stent placement 9/26. Vitals have been stable and his LFT's are trending down post-procedure. He has been tolerated clear liquids and will be discharging home later today with close outpt clinic f/u.    The severity of illness, intensity of service provided, expected LOS and risk for adverse outcome make the care appropriate for further observation; however, doesn't meet criteria for hospital inpatient admission. Dr Escobar notified of this determination, awaiting call back.        The information on this document is developed by the utilization review team in order for the business office to ensure compliance.  This only denotes the appropriateness of proper admission status and does not reflect the quality of care rendered.         The definitions of Inpatient Status and Observation Status used in making the determination above are those provided in the CMS Coverage Manual, Chapter 1 and Chapter 6, section 70.4.        Sincerely,    Tammy Coyle, DO  Utilization Review  Physician Advisor  [facility]

## 2021-09-27 NOTE — PROGRESS NOTES
Low fat/full liquid diet ordered for pt. To have at 1500. Pt. Tolerating clear liquids without any increase in pain.   As needed oxycodone given to help reduce abdominal pain.     Genesis Saldivar RN

## 2021-09-27 NOTE — PLAN OF CARE
Problem: Adult Inpatient Plan of Care  Goal: Optimal Comfort and Wellbeing  Outcome: Improving     Problem: Pain Acute  Goal: Acceptable Pain Control and Functional Ability  Outcome: Improving  Intervention: Develop Pain Management Plan  Recent Flowsheet Documentation  Taken 9/26/2021 1751 by Jimmy Austin RN  Pain Management Interventions:   declines   emotional support  Intervention: Prevent or Manage Pain  Recent Flowsheet Documentation  Taken 9/26/2021 1814 by Jimmy Austin RN  Medication Review/Management: medications reviewed    Pt c/o pain, gave prn Dilaudid iv 0.4 mg x 1.  Pt refused Simvastatin 40 mg.  Pt stated that he usually take 1 pill not 4 pills.  Pt also refused Terazosin 20 mg.  Pt said the color looks different and not 4 pills.  Explained to pt that Simvastatin is 10 mg each for a total of 40 mg and Terazosin is 5 mg each for a total of 20 mg.  Pt was upset and stated that we're trying to mess things up.  Pt also called home to verify how many pills he was taking.

## 2021-09-27 NOTE — PLAN OF CARE
VSS A&Ox4, admits to abdominal pain, verbalized good pain control with 5 mg oxycodone. Steady gait when up in room. Refused C-pap. O2 2L NC started per request. Continue to monitor

## 2021-09-27 NOTE — DISCHARGE SUMMARY
[facility]  Hospitalist Discharge Summary      Date of Admission:  9/25/2021  Date of Discharge:  9/27/2021  Discharging Provider: Ji Escobar MD      Discharge Diagnoses   Pancreatic mass  Abdominal pain  Weight loss  Elevated liver chemistries      Follow-ups Needed After Discharge   Follow-up Appointments     Follow-up and recommended labs and tests       PCP             Unresulted Labs Ordered in the Past 30 Days of this Admission     Date and Time Order Name Status Description    9/26/2021  1:57 PM Cytology, non-gynecologic In process     9/26/2021  1:57 PM Pancreatic Cyst Panel (Includes Cytology) In process           Discharge Disposition   Discharged to home  Condition at discharge: Stable      Hospital Course   Raf Dalton is a 71-year-old gentleman with a medical history significant for hypertension, dyslipidemia, type II diabetes mellitus, tobacco use and COPD who was admitted for evaluation and management of weight loss, weakness and abdominal discomfort.  CT abdomen demonstrated a pancreatic head mass.  Underwent endoscopic ultrasound with FNA of mass and ERCP with biliary sphincterectomy and stent placement.  Pathology analysis in process.  Patient to follow up with gastroenterologist.      Consultations This Hospital Stay   GASTROENTEROLOGY IP CONSULT    Code Status   Full Code    Time Spent on this Encounter   I, Ji Escobar MD, personally saw the patient today and spent greater than 30 minutes discharging this patient.       Ji Escobar MD  [address] [address]  Phone: [phone]  Fax: [phone]  ______________________________________________________________________    Physical Exam   Vital Signs: Temp: 98.3  F (36.8  C) Temp src: Oral BP: (!) 161/85 Pulse: 69   Resp: 16 SpO2: 96 % O2 Device: Nasal cannula Oxygen Delivery: 2 LPM  Weight: 185 lbs 4.8 oz  General: Awake, alert, appropriately  interactive.  Appeared comfortable.  HEENT: Sclera without redness or jaundice.  External ears appear normal  Cardiac: Heart rate controlled, lower extremities not edematous  Pulmonary: Clear to auscultation in bilateral lung fields  Abdomen: Not distended.  Not tender to palpation.  Musculoskeletal: No joint abnormalities noted  Skin: Normally turgid, no rashes noted  Neurologic: Awake, alert, appropriately interactive  Psychiatric: Calm         Primary Care Physician   Barrett Arambula    Discharge Orders      Reason for your hospital stay    Pancreatic abnormality     Activity    Your activity upon discharge: activity as tolerated     Follow-up and recommended labs and tests     PCP     Diet    Follow this diet upon discharge: Advance to a regular diet as tolerated       Significant Results and Procedures   EXAM: CT ABDOMEN PELVIS W CONTRAST  LOCATION: [facility]  DATE/TIME: 9/25/2021 11:39 AM     INDICATION: Pancreatic cancer. Jaundice.  COMPARISON: 8/31/2021  TECHNIQUE: CT scan of the abdomen and pelvis was performed following injection of IV contrast. Multiplanar reformats were obtained. Dose reduction techniques were used.  CONTRAST: Isovue-370 75 mL IV     FINDINGS:   LOWER CHEST: A 5 mm right middle lobe nodule and 4 mm right lower lobe nodule (image 1) are unchanged.     HEPATOBILIARY: Increased moderate intra and extrahepatic bile duct dilatation to the level of the ill-defined pancreatic head mass. Mild gallbladder distention. No calcified gallstones. Moderate diffuse hepatic steatosis. No focal liver lesions.     PANCREAS: Ill-defined pancreatic head mass has increased from approximate dimensions of 4.5 x 3.5 cm to 5.0 x 4.5 cm and the associated distal parenchymal atrophy and ductal dilatation is unchanged.     SPLEEN: Normal.     ADRENAL GLANDS: Normal.     KIDNEYS/BLADDER: Mild chronic parenchymal atrophy right kidney unchanged.     BOWEL: Mild mural thickening proximal duodenal  segments adjacent to the pancreatic head mass has developed.     LYMPH NODES: Mild peripancreatic, portacaval and maddy hepatis lymphadenopathy has increased.     VASCULATURE: AAA stent graft unchanged.     PELVIC ORGANS: Normal.     MUSCULOSKELETAL: Normal.                                                                      IMPRESSION:   1.  Increased moderate intra and extrahepatic bile duct dilatation to the level of the enlarging ill-defined pancreatic head mass.  2.  Mild mural thickening proximal duodenum adjacent to the pancreatic head mass has developed and the mild upper abdomen lymph node enlargement has increased.  3.  Several small lung nodules stable.        Discharge Medications   Current Discharge Medication List      CONTINUE these medications which have NOT CHANGED    Details   ACCU-CHEK TORI Misc [ACCU-CHEK TORI MISC]       ACCU-CHEK SMARTVIEW TEST STRIP strips [ACCU-CHEK SMARTVIEW TEST STRIP STRIPS] TEST BLOOD SUGAR TWICE DAILY  Qty: 200 strip, Refills: 3    Associated Diagnoses: Diabetes mellitus type 2, uncontrolled (H)      acetaminophen (TYLENOL) 500 MG tablet Take 500-1,000 mg by mouth nightly as needed       albuterol (PROAIR HFA;PROVENTIL HFA;VENTOLIN HFA) 90 mcg/actuation inhaler [ALBUTEROL (PROAIR HFA;PROVENTIL HFA;VENTOLIN HFA) 90 MCG/ACTUATION INHALER] Inhale 2 puffs every 6 (six) hours as needed for wheezing.  Qty: 1 each, Refills: 6    Comments: May substitute the equivalent medication per insurance preference.  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  Qty: 25 vial, Refills: 2      aspirin 81 MG EC tablet [ASPIRIN 81 MG EC TABLET] Take 1 tablet (81 mg total) by mouth daily.  Refills: 0    Associated  Diagnoses: Abdominal aneurysm without mention of rupture      budesonide-formoterol (SYMBICORT) 80-4.5 mcg/actuation inhaler [BUDESONIDE-FORMOTEROL (SYMBICORT) 80-4.5 MCG/ACTUATION INHALER] Inhale 2 puffs 2 (two) times a day.  Qty: 1 Inhaler, Refills: 12    Associated Diagnoses: COPD (chronic obstructive pulmonary disease) (H)      buPROPion (WELLBUTRIN SR) 150 MG 12 hr tablet [BUPROPION (WELLBUTRIN SR) 150 MG 12 HR TABLET] TAKE 1 TABLET TWICE DAILY  Qty: 180 tablet, Refills: 2    Associated Diagnoses: Encounter for smoking cessation counseling      cholecalciferol, vitamin D3, (VITAMIN D3) 5,000 unit Tab [CHOLECALCIFEROL, VITAMIN D3, (VITAMIN D3) 5,000 UNIT TAB] Take 1 tablet (5,000 Units total) by mouth daily.  Qty: 90 tablet, Refills: 3    Associated Diagnoses: Healthcare maintenance      cyanocobalamin 1000 MCG tablet [CYANOCOBALAMIN 1000 MCG TABLET] Take 1 tablet (1,000 mcg total) by mouth daily.  Qty: 100 tablet, Refills: 3    Associated Diagnoses: Vitamin B12 deficiency (non anemic)      fluticasone propionate (FLONASE) 50 mcg/actuation nasal spray [FLUTICASONE PROPIONATE (FLONASE) 50 MCG/ACTUATION NASAL SPRAY] 1 spray into each nostril 2 (two) times a day as needed.  Qty: 16 g, Refills: 11    Associated Diagnoses: Chronic obstructive pulmonary disease, unspecified COPD type (H)      gabapentin (NEURONTIN) 300 MG capsule [GABAPENTIN (NEURONTIN) 300 MG CAPSULE] Take 3 capsules in the morning, 2 in the afternoon and 3 capsules in the evening  Qty: 720 capsule, Refills: 3    Associated Diagnoses: Chronic bilateral low back pain, unspecified whether sciatica present      generic lancets (ACCU-CHEK FASTCLIX) [GENERIC LANCETS (ACCU-CHEK FASTCLIX)] USE TO TEST TWICE DAILY  Qty: 200 each, Refills: 3    Associated Diagnoses: Diabetes mellitus type 2, uncontrolled (H)      inhalational spacing device Spcr [INHALATIONAL SPACING DEVICE SPCR] Use two times a day with symbicort  Qty: 1 each, Refills: 0    Comments: Please  dispense brand covered by insurance  Associated Diagnoses: Chronic obstructive pulmonary disease, unspecified COPD type (H)      ketoconazole (NIZORAL) 2 % cream [KETOCONAZOLE (NIZORAL) 2 % CREAM] ketoconazole 2 % topical cream   APPLY TO FUNGUS NAILS DAILY FOR 90 DAYS      lisinopriL (PRINIVIL,ZESTRIL) 20 MG tablet [LISINOPRIL (PRINIVIL,ZESTRIL) 20 MG TABLET] TAKE 1 TABLET TWICE DAILY FOR BLOOD PRESSURE  Qty: 180 tablet, Refills: 1    Associated Diagnoses: Essential hypertension      metFORMIN (GLUCOPHAGE-XR) 500 MG 24 hr tablet [METFORMIN (GLUCOPHAGE-XR) 500 MG 24 HR TABLET] TAKE 2 TABLETS TWICE DAILY  Qty: 360 tablet, Refills: 3    Associated Diagnoses: Type 2 diabetes mellitus with diabetic polyneuropathy, without long-term current use of insulin (H)      oxybutynin (DITROPAN) 5 MG tablet Take 2.5 mg by mouth 2 times daily       oxyCODONE (ROXICODONE) 5 MG tablet Take 5 mg by mouth every 6 hours as needed for severe pain      sildenafil (REVATIO) 20 mg tablet [SILDENAFIL (REVATIO) 20 MG TABLET] Take by mouth as needed.         Refills: 3      simvastatin (ZOCOR) 40 MG tablet [SIMVASTATIN (ZOCOR) 40 MG TABLET] TAKE 1 AND 1/2 TABLETS EVERY DAY  Qty: 135 tablet, Refills: 3    Associated Diagnoses: Hyperlipemia      terazosin (HYTRIN) 10 MG capsule [TERAZOSIN (HYTRIN) 10 MG CAPSULE] TAKE 2 CAPSULES AT BEDTIME  Qty: 180 capsule, Refills: 2    Associated Diagnoses: Benign prostatic hyperplasia with urinary frequency      testosterone (ANDROGEL) 1.62 % (40.5 mg/2.5 gram) GlPk [TESTOSTERONE (ANDROGEL) 1.62 % (40.5 MG/2.5 GRAM) GLPK] Apply 1 pck daily  Qty: 75 g, Refills: 5    Associated Diagnoses: Low testosterone      tiotropium (SPIRIVA WITH HANDIHALER) 18 mcg inhalation capsule [TIOTROPIUM (SPIRIVA WITH HANDIHALER) 18 MCG INHALATION CAPSULE] INHALE CONTENTS OF 1 CAPSULE DAILY  Qty: 30 capsule, Refills: 5    Associated Diagnoses: COPD (chronic obstructive pulmonary disease) (H)      traZODone (DESYREL) 50 MG tablet  [TRAZODONE (DESYREL) 50 MG TABLET] Take 1 tablet (50 mg total) by mouth at bedtime as needed for sleep.  Qty: 90 tablet, Refills: 1    Associated Diagnoses: Chronic obstructive pulmonary disease, unspecified COPD type (H)      TRULICITY 0.75 mg/0.5 mL PnIj [TRULICITY 0.75 MG/0.5 ML PNIJ] INJECT  0.75MG  UNDER  THE  SKIN EVERY 7 DAYS  Qty: 12 Syringe, Refills: 3    Associated Diagnoses: Type 2 diabetes mellitus without complication, without long-term current use of insulin (H)         STOP taking these medications       ibuprofen (ADVIL/MOTRIN) 200 MG tablet Comments:   Reason for Stopping:             Allergies   No Known Allergies

## 2021-10-01 NOTE — TELEPHONE ENCOUNTER
RECORDS STATUS - ALL OTHER DIAGNOSIS      RECORDS RECEIVED FROM: [facility], [facility], [facility]   DATE RECEIVED:    NOTES STATUS DETAILS   OFFICE NOTE from referring provider Epic Dr. Gabriel Garza   OFFICE NOTE from medical oncologist     DISCHARGE SUMMARY from hospital [facility] 9/25/21   DISCHARGE REPORT from the ER [facility] 9/25/21   OPERATIVE REPORT Epic 9/26/21: EGD   MEDICATION LIST [facility]    CLINICAL TRIAL TREATMENTS TO DATE     LABS     PATHOLOGY REPORTS [facility] 9/26/21: Cytology   ANYTHING RELATED TO DIAGNOSIS Epic 9/29/21   GENONOMIC TESTING     TYPE:     IMAGING (NEED IMAGES & REPORT)     CT SCANS PACS 8/31/21, 6/29/21: [facility]   MRI PACS 9/15/21: [facility]   MAMMO     ULTRASOUND     PET NIL 9/9/15: [facility]

## 2021-10-01 NOTE — TELEPHONE ENCOUNTER
"Remedios called to assist pt with future appt clarifications. Pt was recently discharged on 9/27/21 from [facility] and its noted he has appts in May 2022 for a \"couple of things.\" Writer stated pt will be coming back to us for a 1 yr f/u in May and pt will be going to [facility] for a CT scan first and then coming to the  vascular clinic for an ultrasound and see Dr. Brian afterwards. Remedios thanked writer for the clarification.  "

## 2021-10-03 NOTE — PROGRESS NOTES
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 Uriel Dennis (appt 11/15/21)  GI: Dr. aGbriel Garza   Radiation oncologist:    Other members of care team: PCP, Dr. Barrett Arambula  Primary caregiver at home/ contact:  Wife-- primary caretaker for wife  Daughter, Kasia,     Reason for consultation/ patient visit:  Jaundice and newly diagnosed pancreatic cancer    History of presenting illness:  Mr. Raf Dalton is a 71 year old man.    HTN, DM2, obesity and sleep apnea, BPH, heavy nicotine use and COPD, AAA repair in 2019, neuropathy.     Over 2-3 months, some belly pains.  Then in 9/2021, noticed jaundice.  Went to ED on 9/25/2021. Bili 25 with CT A/P with biliary dilation with HOP mass.    EUS/ ERCP on 9/25  EUS:  HOP mass measured 53 mm by 45 mm in maximal cross-sectional diameter. The outer margins were irregular. There was sonographic evidence suggesting invasion into the portal vein (manifested by abutment), the superior mesenteric vein (manifested by encasement) and the splenic vein (manifested by abutment).   ERCP:  A single segmental biliary stricture was found in the lower third of the main bile duct. The stricture was malignant appearing. The upper third of the main bile duct and middle third of the main bile duct were dilated, with a mass causing an obstruction.- A biliary sphincterotomy was performed and one covered metal stent was placed into the common bile duct.     FNA     POSITIVE FOR MALIGNANT CELLS    CELL MORPHOLOGY IS CONSISTENT WITH DUCTAL ADENOCARCINOMA OF PANCREAS     Colonoscopy 9/27/18 revealed internal hemorrhoids,  diverticulosis, and tubular adenoma X2 removed. Recommended to have a repeat colonoscopy in 3 years (scheduled for 11/2021).      Geriatric Assessment    Functional status (ECOG performance status): 1-2    ADL (transferring, eating):  yes  IADL (finances, cooking, driving): yes  Objective function (timed get up and go test, <12 seconds): 10 seconds  Falls in past 6 months:  none  Cognitive function:  ok  Incontinence: none      Interval history:  Comes with daughter, Kasia,   Not been eating too much-- 30 lb weight loss  No pain per se but some discomfort in belly  Feeling dizzy and weak overall    Review of Systems: 14 point ROS negative other than the symptoms noted above in the HPI.    Past medical history:  HTN, DM2, obesity and sleep apnea on CPAP, BPH, heavy nicotine use and COPD,  neuropathy. ,     Past surgical history:  AAA repair in 2019    Social history:   80 pack year smoking history, still smoking  History of alcohol use  Lives with wife at home - Bhavana-- double amputee.  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  Kasia, daughter, 59 yo, lives with [place], [place]  Luiza, daughter 2, lives in [place] also  Younger son, Gabriel, lives in [place]      Family history:  Mother-lung cancer, HTN  Father-  MI    Allergies:   No Known Allergies    Outpatient medications:     Current Outpatient Medications:      ACCU-CHEK TORI Misc, [ACCU-CHEK TORI MISC] , Disp: , Rfl:      ACCU-CHEK SMARTVIEW TEST STRIP strips, [ACCU-CHEK SMARTVIEW TEST STRIP STRIPS] TEST BLOOD SUGAR TWICE DAILY, Disp: 200 strip, Rfl: 3     acetaminophen (TYLENOL) 500 MG tablet, Take 500-1,000 mg by mouth nightly as needed , Disp: , Rfl:      albuterol (PROAIR HFA;PROVENTIL HFA;VENTOLIN HFA) 90 mcg/actuation inhaler, [ALBUTEROL (PROAIR HFA;PROVENTIL HFA;VENTOLIN HFA) 90 MCG/ACTUATION INHALER] Inhale 2 puffs every 6 (six) hours as needed for wheezing., Disp: 1 each, Rfl: 6      albuterol (PROVENTIL) 2.5 mg /3 mL (0.083 %) nebulizer solution, [ALBUTEROL (PROVENTIL) 2.5 MG /3 ML (0.083 %) NEBULIZER SOLUTION] Take 3 mL (2.5 mg total) by nebulization every 6 (six) hours as needed for wheezing or shortness of breath., Disp: 25 vial, Rfl: 2     aspirin 81 MG EC tablet, [ASPIRIN 81 MG EC TABLET] Take 1 tablet (81 mg total) by mouth daily., Disp: , Rfl: 0     budesonide-formoterol (SYMBICORT) 80-4.5 mcg/actuation inhaler, [BUDESONIDE-FORMOTEROL (SYMBICORT) 80-4.5 MCG/ACTUATION INHALER] Inhale 2 puffs 2 (two) times a day., Disp: 1 Inhaler, Rfl: 12     buPROPion (WELLBUTRIN SR) 150 MG 12 hr tablet, [BUPROPION (WELLBUTRIN SR) 150 MG 12 HR TABLET] TAKE 1 TABLET TWICE DAILY, Disp: 180 tablet, Rfl: 2     cholecalciferol, vitamin D3, (VITAMIN D3) 5,000 unit Tab, [CHOLECALCIFEROL, VITAMIN D3, (VITAMIN D3) 5,000 UNIT TAB] Take 1 tablet (5,000 Units total) by mouth daily. (Patient taking differently: Take 1,000 Units by mouth daily ), Disp: 90 tablet, Rfl: 3     cyanocobalamin 1000 MCG tablet, [CYANOCOBALAMIN 1000 MCG TABLET] Take 1 tablet (1,000 mcg total) by mouth daily., Disp: 100 tablet, Rfl: 3     fluticasone propionate (FLONASE) 50 mcg/actuation nasal spray, [FLUTICASONE PROPIONATE (FLONASE) 50 MCG/ACTUATION NASAL SPRAY] 1 spray into each nostril 2 (two) times a day as needed., Disp: 16 g, Rfl: 11     gabapentin (NEURONTIN) 300 MG capsule, [GABAPENTIN (NEURONTIN) 300 MG CAPSULE] Take 3 capsules in the morning, 2 in the afternoon and 3 capsules in the evening (Patient taking differently: Take 900 mg by mouth 2 times daily ), Disp: 720 capsule, Rfl: 3     generic lancets (ACCU-CHEK FASTCLIX), [GENERIC LANCETS (ACCU-CHEK FASTCLIX)] USE TO TEST TWICE DAILY, Disp: 200 each, Rfl: 3     inhalational spacing device Spcr, [INHALATIONAL SPACING DEVICE SPCR] Use two times a day with symbicort, Disp: 1 each, Rfl: 0     ketoconazole (NIZORAL) 2 % cream, [KETOCONAZOLE (NIZORAL) 2 % CREAM] ketoconazole 2 % topical  cream   APPLY TO FUNGUS NAILS DAILY FOR 90 DAYS, Disp: , Rfl:      lisinopriL (PRINIVIL,ZESTRIL) 20 MG tablet, [LISINOPRIL (PRINIVIL,ZESTRIL) 20 MG TABLET] TAKE 1 TABLET TWICE DAILY FOR BLOOD PRESSURE, Disp: 180 tablet, Rfl: 1     metFORMIN (GLUCOPHAGE-XR) 500 MG 24 hr tablet, [METFORMIN (GLUCOPHAGE-XR) 500 MG 24 HR TABLET] TAKE 2 TABLETS TWICE DAILY, Disp: 360 tablet, Rfl: 3     oxybutynin (DITROPAN) 5 MG tablet, Take 2.5 mg by mouth 2 times daily , Disp: , Rfl:      oxyCODONE (ROXICODONE) 5 MG tablet, Take 5 mg by mouth every 6 hours as needed for severe pain, Disp: , Rfl:      sildenafil (REVATIO) 20 mg tablet, [SILDENAFIL (REVATIO) 20 MG TABLET] Take by mouth as needed.       , Disp: , Rfl: 3     simvastatin (ZOCOR) 40 MG tablet, [SIMVASTATIN (ZOCOR) 40 MG TABLET] TAKE 1 AND 1/2 TABLETS EVERY DAY (Patient taking differently: At Bedtime ), Disp: 135 tablet, Rfl: 3     terazosin (HYTRIN) 10 MG capsule, [TERAZOSIN (HYTRIN) 10 MG CAPSULE] TAKE 2 CAPSULES AT BEDTIME, Disp: 180 capsule, Rfl: 2     testosterone (ANDROGEL) 1.62 % (40.5 mg/2.5 gram) GlPk, [TESTOSTERONE (ANDROGEL) 1.62 % (40.5 MG/2.5 GRAM) GLPK] Apply 1 pck daily, Disp: 75 g, Rfl: 5     tiotropium (SPIRIVA WITH HANDIHALER) 18 mcg inhalation capsule, [TIOTROPIUM (SPIRIVA WITH HANDIHALER) 18 MCG INHALATION CAPSULE] INHALE CONTENTS OF 1 CAPSULE DAILY, Disp: 30 capsule, Rfl: 5     traZODone (DESYREL) 50 MG tablet, [TRAZODONE (DESYREL) 50 MG TABLET] Take 1 tablet (50 mg total) by mouth at bedtime as needed for sleep. (Patient not taking: Reported on 8/16/2021), Disp: 90 tablet, Rfl: 1     TRULICITY 0.75 mg/0.5 mL PnIj, [TRULICITY 0.75 MG/0.5 ML PNIJ] INJECT  0.75MG  UNDER  THE  SKIN EVERY 7 DAYS (Patient taking differently: Inject 0.75 mg Subcutaneous every 7 days mondays), Disp: 12 Syringe, Rfl: 3      Physical examination:  Vital signs: /68 (BP Location: Right arm, Patient Position: Sitting, Cuff Size: Adult Regular)   Pulse 80   Temp 98.2  F (36.8  " C) (Oral)   Ht 1.74 m (5' 8.5\")   Wt 84.3 kg (185 lb 14.4 oz)   SpO2 94%   BMI 27.85 kg/m        ECOG performance status:  1-2  Vascular access:  None yet    GENERAL: Well-nourished healthy-appearing man in chair, no acute distress.   HEENT: No icterus, no pallor. Moist mucous membranes.   NECK: Supple, no JVD/LAD.  LUNGS: Clear to ausculation bilaterally, normal work of breathing.   CARDIOVASCULAR: Regular rate and rhythm, no murmurs, gallops or rubs.   ABDOMEN: Soft, nontender and nondistended.  EXTREMITIES: No cyanosis, no clubbing, no edema.   NEUROLOGIC: No focal deficits, alert/ oriented  LYMPH NODE EXAM: No palpable adenopathy.        Lab data:  I have personally reviewed the following lab data: CBC CMP which are notable for  Cr 1, albumin 3    Radiology data:  I have personally reviewed the images of / or the following radiology data: CT A/P which are notable for  5 cm pancreatic head mass with biliary dilation    Pathology and other data:  I have personally reviewed and interpreted the following data: pancreatic head mass notable for adenocarcinoma        Assessment and Plan:  Mr. Raf Dalton is a 71 year old male with multi-morbidity (AAA repair, HTN, DM, COPD, nicotine use) who presented with jaundice and has since been found to have 5 cm head of pancreas ductal adenocarcinoma with biliary obstruction s/p stenting.    We discussed details about pancreatic cancer. We discussed possible stages of cancer, and how we still needed some more info.  We discussed role of upfront chemo, even if this appeared ''resectable''. We discussed the rationale for neoadjuvant chemotherapy in detail:   1) to shrink tumor,   2) treat micrometastases,   3) test biology of disease, and   4) better tolerability prior to (as opposed to after) surgery.     We discussed general concepts of chemotherapy. Chemotherapy drugs are toxins that are used to kill cancer cells. Unfortunately none of the currently available " chemotherapeutic agents have toxicity that only effects the cancer cell alone, most have some toxic effects on normal cells. The general side effects from chemotherapy include dermatologic side effects of hair loss and skin and nail changes; gastro-intestinal side effects of nausea, vomiting, diarrhea, constipation, heartburn and mouth sores; hematologic effects of lowering of the blood counts including lowered white blood cell count and neutrophil count with risk of infection, lowered red blood cell count (anemia) with fatigue and possible requirement for red blood cell transfusion, and lowered platelet count with risk of bruising and bleeding; and other effects including runny nose, tearing of the eyes, taste changes, irritation of the bladder, allergic reactions, weight gain, neuropathy (numbness and tingling in the hands and feet) and joint and muscle pain. Rare side effects include heart damage, leukemia and death.  The patient will be receiving chemotherapy teaching through our nurse coordinators with handouts describing all of the side effects again. Urgent clinical signs and ER warnings discussed. Verbal consent for chemotherapy obtained.   Proceeding with this treatment has a number of risks, which we discussed. We will need to intensively monitor for toxicity following the treatment with scheduled bloodwork and clinical assessment.       Problem list:  Likely locally advanced pancreatic ductal adenocarcinoma  Cancer associated biliary obstruction  Cancer associated pain  Polypharmacy      Plan:  CT chest to complete staging-- known lung nodules-- on 10/8/2021  Port placement-- order placed  CBC, CMP, Ca19-9, INR, COVID, Hba1c today-- hopefully bilirubin normalized now  Will speak with radiology to confirm LAPC-- looks like it, and with endoscopic appearance  Scheduled to see Dr. Uriel Dennis in 11/2021    See me back in 1 week virtually to discuss CT findings and explain to wife as well  May decide to start  FOLFOX/ [id number]    Assess for PANOVA 3 trial-- will speak with Katherine Daniels RN  Establish care with palliative care  Molecular analysis ([facility])    Consider soon:  Genetics team visit for germline testing  Establish care with nutrition      The patient and family asked numerous excellent questions which I answered to the best of my abilities. At the completion of our consultation, the patient and accompanying caregivers had an excellent understanding of the plan. They have our contact information for any further questions or concerns and of course, as always, we are available in the interim.       80 minutes spent on the date of the encounter doing chart review, review of test results, interpretation of tests, patient visit, documentation, discussion with other provider(s) and discussion with family         Andrea Loya M.D.   of Medicine  Division of Hematology, Oncology and Transplantation  [facility]

## 2021-10-04 NOTE — NURSING NOTE
"Oncology Rooming Note    October 4, 2021 1:18 PM   Raf Dalton is a 71 year old male who presents for:    Chief Complaint   Patient presents with     Oncology Clinic Visit     Cancer of head of pancreas (H)     Initial Vitals: /68 (BP Location: Right arm, Patient Position: Sitting, Cuff Size: Adult Regular)   Pulse 80   Temp 98.2  F (36.8  C) (Oral)   Ht 1.74 m (5' 8.5\")   Wt 84.3 kg (185 lb 14.4 oz)   SpO2 94%   BMI 27.85 kg/m   Estimated body mass index is 27.85 kg/m  as calculated from the following:    Height as of this encounter: 1.74 m (5' 8.5\").    Weight as of this encounter: 84.3 kg (185 lb 14.4 oz). Body surface area is 2.02 meters squared.  Worst Pain (10) Comment: without medication   No LMP for male patient.  Allergies reviewed: Yes  Medications reviewed: Yes    Medications: Medication refills not needed today.  Pharmacy name entered into [facility]:    [facility][id number] - [place], [place] - [facility] [address] AT [facility] - [facility] [address]    Clinical concerns: New patient. Without medication abdomen pain 10/10. Frequent urination-disrupts sleep.      Carolyn Rinaldi LPN October 4, 2021 1:19 PM              "

## 2021-10-04 NOTE — LETTER
10/4/2021         RE: Raf Dalton  [address]  [place] [address]        Dear Colleague,    Thank you for referring your patient, Raf Dalton, to the [facility]. Please see a copy of my visit note below.    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 Uriel Dennis (appt 11/15/21)  GI: Dr. Gabriel Garza   Radiation oncologist:    Other members of care team: PCP, Dr. Barrett Arambula  Primary caregiver at home/ contact:  Wife-- primary caretaker for wife  Daughter, Kasia,     Reason for consultation/ patient visit:  Jaundice and newly diagnosed pancreatic cancer    History of presenting illness:  Mr. Raf Dalton is a 71 year old man.    HTN, DM2, obesity and sleep apnea, BPH, heavy nicotine use and COPD, AAA repair in 2019, neuropathy.     Over 2-3 months, some belly pains.  Then in 9/2021, noticed jaundice.  Went to ED on 9/25/2021. Bili 25 with CT A/P with biliary dilation with HOP mass.    EUS/ ERCP on 9/25  EUS:  HOP mass measured 53 mm by 45 mm in maximal cross-sectional diameter. The outer margins were irregular. There was sonographic evidence suggesting invasion into the portal vein (manifested by abutment), the superior mesenteric vein (manifested by encasement) and the splenic vein (manifested by abutment).   ERCP:  A single segmental biliary stricture was found in the lower third of the main bile duct. The stricture was malignant appearing. The upper third of the main bile duct and middle third of the main bile duct were dilated, with a mass causing an obstruction.- A biliary  sphincterotomy was performed and one covered metal stent was placed into the common bile duct.     FNA     POSITIVE FOR MALIGNANT CELLS    CELL MORPHOLOGY IS CONSISTENT WITH DUCTAL ADENOCARCINOMA OF PANCREAS     Colonoscopy 9/27/18 revealed internal hemorrhoids, diverticulosis, and tubular adenoma X2 removed. Recommended to have a repeat colonoscopy in 3 years (scheduled for 11/2021).      Geriatric Assessment    Functional status (ECOG performance status): 1-2    ADL (transferring, eating):  yes  IADL (finances, cooking, driving): yes  Objective function (timed get up and go test, <12 seconds): 10 seconds  Falls in past 6 months:  none  Cognitive function:  ok  Incontinence: none      Interval history:  Comes with daughter, Kasia,   Not been eating too much-- 30 lb weight loss  No pain per se but some discomfort in belly  Feeling dizzy and weak overall    Review of Systems: 14 point ROS negative other than the symptoms noted above in the HPI.    Past medical history:  HTN, DM2, obesity and sleep apnea on CPAP, BPH, heavy nicotine use and COPD,  neuropathy. ,     Past surgical history:  AAA repair in 2019    Social history:   80 pack year smoking history, still smoking  History of alcohol use  Lives with wife at home - Bhavana-- double amputee.  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  Kasia, daughter, 59 yo, lives with [place], [place]  Luiza, daughter 2, lives in [place] also  Younger son, Gabriel, lives in [place]      Family history:  Mother-lung cancer, HTN  Father-  MI    Allergies:   No Known Allergies    Outpatient medications:     Current Outpatient Medications:      ACCU-CHEK TORI Misc, [ACCU-CHEK TORI MISC] , Disp: , Rfl:      ACCU-CHEK SMARTVIEW TEST STRIP strips, [ACCU-CHEK SMARTVIEW TEST STRIP STRIPS] TEST BLOOD SUGAR TWICE DAILY, Disp: 200 strip, Rfl: 3     acetaminophen (TYLENOL) 500 MG tablet, Take 500-1,000 mg by mouth nightly as needed ,  Disp: , Rfl:      albuterol (PROAIR HFA;PROVENTIL HFA;VENTOLIN HFA) 90 mcg/actuation inhaler, [ALBUTEROL (PROAIR HFA;PROVENTIL HFA;VENTOLIN HFA) 90 MCG/ACTUATION INHALER] Inhale 2 puffs every 6 (six) hours as needed for wheezing., Disp: 1 each, Rfl: 6     albuterol (PROVENTIL) 2.5 mg /3 mL (0.083 %) nebulizer solution, [ALBUTEROL (PROVENTIL) 2.5 MG /3 ML (0.083 %) NEBULIZER SOLUTION] Take 3 mL (2.5 mg total) by nebulization every 6 (six) hours as needed for wheezing or shortness of breath., Disp: 25 vial, Rfl: 2     aspirin 81 MG EC tablet, [ASPIRIN 81 MG EC TABLET] Take 1 tablet (81 mg total) by mouth daily., Disp: , Rfl: 0     budesonide-formoterol (SYMBICORT) 80-4.5 mcg/actuation inhaler, [BUDESONIDE-FORMOTEROL (SYMBICORT) 80-4.5 MCG/ACTUATION INHALER] Inhale 2 puffs 2 (two) times a day., Disp: 1 Inhaler, Rfl: 12     buPROPion (WELLBUTRIN SR) 150 MG 12 hr tablet, [BUPROPION (WELLBUTRIN SR) 150 MG 12 HR TABLET] TAKE 1 TABLET TWICE DAILY, Disp: 180 tablet, Rfl: 2     cholecalciferol, vitamin D3, (VITAMIN D3) 5,000 unit Tab, [CHOLECALCIFEROL, VITAMIN D3, (VITAMIN D3) 5,000 UNIT TAB] Take 1 tablet (5,000 Units total) by mouth daily. (Patient taking differently: Take 1,000 Units by mouth daily ), Disp: 90 tablet, Rfl: 3     cyanocobalamin 1000 MCG tablet, [CYANOCOBALAMIN 1000 MCG TABLET] Take 1 tablet (1,000 mcg total) by mouth daily., Disp: 100 tablet, Rfl: 3     fluticasone propionate (FLONASE) 50 mcg/actuation nasal spray, [FLUTICASONE PROPIONATE (FLONASE) 50 MCG/ACTUATION NASAL SPRAY] 1 spray into each nostril 2 (two) times a day as needed., Disp: 16 g, Rfl: 11     gabapentin (NEURONTIN) 300 MG capsule, [GABAPENTIN (NEURONTIN) 300 MG CAPSULE] Take 3 capsules in the morning, 2 in the afternoon and 3 capsules in the evening (Patient taking differently: Take 900 mg by mouth 2 times daily ), Disp: 720 capsule, Rfl: 3     generic lancets (ACCU-CHEK FASTCLIX), [GENERIC LANCETS (ACCU-CHEK FASTCLIX)] USE TO TEST TWICE  DAILY, Disp: 200 each, Rfl: 3     inhalational spacing device Spcr, [INHALATIONAL SPACING DEVICE SPCR] Use two times a day with symbicort, Disp: 1 each, Rfl: 0     ketoconazole (NIZORAL) 2 % cream, [KETOCONAZOLE (NIZORAL) 2 % CREAM] ketoconazole 2 % topical cream   APPLY TO FUNGUS NAILS DAILY FOR 90 DAYS, Disp: , Rfl:      lisinopriL (PRINIVIL,ZESTRIL) 20 MG tablet, [LISINOPRIL (PRINIVIL,ZESTRIL) 20 MG TABLET] TAKE 1 TABLET TWICE DAILY FOR BLOOD PRESSURE, Disp: 180 tablet, Rfl: 1     metFORMIN (GLUCOPHAGE-XR) 500 MG 24 hr tablet, [METFORMIN (GLUCOPHAGE-XR) 500 MG 24 HR TABLET] TAKE 2 TABLETS TWICE DAILY, Disp: 360 tablet, Rfl: 3     oxybutynin (DITROPAN) 5 MG tablet, Take 2.5 mg by mouth 2 times daily , Disp: , Rfl:      oxyCODONE (ROXICODONE) 5 MG tablet, Take 5 mg by mouth every 6 hours as needed for severe pain, Disp: , Rfl:      sildenafil (REVATIO) 20 mg tablet, [SILDENAFIL (REVATIO) 20 MG TABLET] Take by mouth as needed.       , Disp: , Rfl: 3     simvastatin (ZOCOR) 40 MG tablet, [SIMVASTATIN (ZOCOR) 40 MG TABLET] TAKE 1 AND 1/2 TABLETS EVERY DAY (Patient taking differently: At Bedtime ), Disp: 135 tablet, Rfl: 3     terazosin (HYTRIN) 10 MG capsule, [TERAZOSIN (HYTRIN) 10 MG CAPSULE] TAKE 2 CAPSULES AT BEDTIME, Disp: 180 capsule, Rfl: 2     testosterone (ANDROGEL) 1.62 % (40.5 mg/2.5 gram) GlPk, [TESTOSTERONE (ANDROGEL) 1.62 % (40.5 MG/2.5 GRAM) GLPK] Apply 1 pck daily, Disp: 75 g, Rfl: 5     tiotropium (SPIRIVA WITH HANDIHALER) 18 mcg inhalation capsule, [TIOTROPIUM (SPIRIVA WITH HANDIHALER) 18 MCG INHALATION CAPSULE] INHALE CONTENTS OF 1 CAPSULE DAILY, Disp: 30 capsule, Rfl: 5     traZODone (DESYREL) 50 MG tablet, [TRAZODONE (DESYREL) 50 MG TABLET] Take 1 tablet (50 mg total) by mouth at bedtime as needed for sleep. (Patient not taking: Reported on 8/16/2021), Disp: 90 tablet, Rfl: 1     TRULICITY 0.75 mg/0.5 mL PnIj, [TRULICITY 0.75 MG/0.5 ML PNIJ] INJECT  0.75MG  UNDER  THE  SKIN EVERY 7 DAYS (Patient  "taking differently: Inject 0.75 mg Subcutaneous every 7 days mondays), Disp: 12 Syringe, Rfl: 3      Physical examination:  Vital signs: /68 (BP Location: Right arm, Patient Position: Sitting, Cuff Size: Adult Regular)   Pulse 80   Temp 98.2  F (36.8  C) (Oral)   Ht 1.74 m (5' 8.5\")   Wt 84.3 kg (185 lb 14.4 oz)   SpO2 94%   BMI 27.85 kg/m        ECOG performance status:  1-2  Vascular access:  None yet    GENERAL: Well-nourished healthy-appearing man in chair, no acute distress.   HEENT: No icterus, no pallor. Moist mucous membranes.   NECK: Supple, no JVD/LAD.  LUNGS: Clear to ausculation bilaterally, normal work of breathing.   CARDIOVASCULAR: Regular rate and rhythm, no murmurs, gallops or rubs.   ABDOMEN: Soft, nontender and nondistended.  EXTREMITIES: No cyanosis, no clubbing, no edema.   NEUROLOGIC: No focal deficits, alert/ oriented  LYMPH NODE EXAM: No palpable adenopathy.        Lab data:  I have personally reviewed the following lab data: CBC CMP which are notable for  Cr 1, albumin 3    Radiology data:  I have personally reviewed the images of / or the following radiology data: CT A/P which are notable for  5 cm pancreatic head mass with biliary dilation    Pathology and other data:  I have personally reviewed and interpreted the following data: pancreatic head mass notable for adenocarcinoma        Assessment and Plan:  Mr. Raf Dalton is a 71 year old male with multi-morbidity (AAA repair, HTN, DM, COPD, nicotine use) who presented with jaundice and has since been found to have 5 cm head of pancreas ductal adenocarcinoma with biliary obstruction s/p stenting.    We discussed details about pancreatic cancer. We discussed possible stages of cancer, and how we still needed some more info.  We discussed role of upfront chemo, even if this appeared ''resectable''. We discussed the rationale for neoadjuvant chemotherapy in detail:   1) to shrink tumor,   2) treat micrometastases,   3) test " biology of disease, and   4) better tolerability prior to (as opposed to after) surgery.     We discussed general concepts of chemotherapy. Chemotherapy drugs are toxins that are used to kill cancer cells. Unfortunately none of the currently available chemotherapeutic agents have toxicity that only effects the cancer cell alone, most have some toxic effects on normal cells. The general side effects from chemotherapy include dermatologic side effects of hair loss and skin and nail changes; gastro-intestinal side effects of nausea, vomiting, diarrhea, constipation, heartburn and mouth sores; hematologic effects of lowering of the blood counts including lowered white blood cell count and neutrophil count with risk of infection, lowered red blood cell count (anemia) with fatigue and possible requirement for red blood cell transfusion, and lowered platelet count with risk of bruising and bleeding; and other effects including runny nose, tearing of the eyes, taste changes, irritation of the bladder, allergic reactions, weight gain, neuropathy (numbness and tingling in the hands and feet) and joint and muscle pain. Rare side effects include heart damage, leukemia and death.  The patient will be receiving chemotherapy teaching through our nurse coordinators with handouts describing all of the side effects again. Urgent clinical signs and ER warnings discussed. Verbal consent for chemotherapy obtained.   Proceeding with this treatment has a number of risks, which we discussed. We will need to intensively monitor for toxicity following the treatment with scheduled bloodwork and clinical assessment.       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 Uriel Dennis in 11/2021    See me back in 1 week virtually to discuss CT findings and explain to wife as well  May decide to start FOLFOX/ [id number]    Assess for PANOVA 3 trial-- will speak with Katherine Daniels, RN  Establish care with palliative care  Molecular analysis ([facility])    Consider soon:  Genetics team visit for germline testing  Establish care with nutrition      The patient and family asked numerous excellent questions which I answered to the best of my abilities. At the completion of our consultation, the patient and accompanying caregivers had an excellent understanding of the plan. They have our contact information for any further questions or concerns and of course, as always, we are available in the interim.       80 minutes spent on the date of the encounter doing chart review, review of test results, interpretation of tests, patient visit, documentation, discussion with other provider(s) and discussion with family         Andrea Loya M.D.   of Medicine  Division of Hematology, Oncology and Transplantation  [facility]        Again, thank you for allowing me to participate in the care of your patient.        Sincerely,        Andrea Loya MD

## 2021-10-04 NOTE — PROGRESS NOTES
Oncology RN Care Coordination Note:     This writer has completed an online requisition for UTILICASE molecular profiling per Dr. Loya's request.    I have submitted the pathology report collected on 09.26.2021 and the patient's face sheet.     Dr. Loya has been updated.      Beatris Hill, RN BSN   [facility]  Nurse Coordinator

## 2021-10-06 NOTE — DISCHARGE INSTRUCTIONS
All of your tests look good today.  I recommend you call make an appointment to follow-up with family doctor for this week for recheck.    Be sure you are eating and drinking well as sometimes dehydration can present like this.    Return to emergency department with worse dizziness or lightheadedness, passout or feeling he could pass out, worsening headache, fever, chest pain, difficulty breathing, or any other concerns.    Thank you for choosing [facility].  It has been my pleasure caring for you today.     ~Dr. Maggie MD

## 2021-10-06 NOTE — ED PROVIDER NOTES
EMERGENCY DEPARTMENT ENCOUNTER      NAME: Raf Dalton  AGE: 71 year old male  YOB: 1950  MRN: [id number]  EVALUATION DATE & TIME: 10/6/2021 10:32 AM    PCP: Barrett Arambula    ED PROVIDER: Sofi Serrano M.D.        Chief Complaint   Patient presents with     Fatigue         FINAL IMPRESSION:    1. Lightheadedness            MEDICAL DECISION MAKING:    Raf Dalton is a 71 year old male with history of type 2 diabetes mellitus, AAA, COPD, and cancer of head of pancreas who presents to the ER with complaints of generalized weakness.   Patient has been having increasing weakness and lightheadedness for about the past 2 weeks.  States it was worse yesterday into today.  Throughout the ER became completely asymptomatic.  Neuro exam normal.  MRI of the brain was done without findings for acute stroke or pathology.  Not sound cardiac in nature.  At this time I feel he can follow-up with primary care for reevaluation.  He has been able to ambulate in the ER without difficulty.  Patient updated on all results and agree with the plan.  Discharged home with family.      ED COURSE:  10:41 AM I met with the patient to gather history and perform my exam. ED course and treatment discussed.  Does not sound acute stroke.  Symptoms have been getting worse over the past week or so, worse over the past 24+ hours.  Would not be a TPA candidate due to this.    1:50 PM  Still waiting for pt to go to MRI.    2:44 PM  Patient has not yet had the meclizine because he reports he is asymptomatic. He got up and around at MRI without any lightheadedness or dizziness and has been up to the bathroom without dizziness. I do feel he can hold the meclizine. He is requesting his usual oral oxycodone 5 mg for his pancreatic pain and this has been ordered. Patient otherwise hemodynamically stable. If MRI is unremarkable then I feel he can be discharged home. Dizziness does not sound cardiac in nature as he is not had any  near syncope or loss of consciousness. Work-up otherwise so far unremarkable here in the ER.    3:22 PM We discussed plans for discharge including supportive cares, symptomatic treatment, outpatient follow up, and reasons to return to the emergency department.    At this time I do not think that this represents CVA, TIA, SAH, glaucoma, temporal arteritis, sinus thrombosis, vascular dissection, pseudotumor cerebri, meningitis, enchephalitis, hypertensive urgency or emergency, or other such etiologies.      COVID-19 PPE worn during patient evaluation:  Mask: n95 and homemade masks   Eye Protection: goggles   Gown: none  Hair cover: yes  Face shield: none  Patient wearing a mask: yes    At the conclusion of the encounter I discussed the results of all of the tests and the disposition. Their questions were answered. The patient (and any family present) acknowledged understanding and were agreeable with the care plan.        CONSULTANTS:  none        MEDICATIONS GIVEN IN THE EMERGENCY:  Medications   sodium chloride 0.9% infusion (0 mLs Intravenous Stopped 10/6/21 1542)   gadobutrol (GADAVIST) injection 8.5 mL (8.5 mLs Intravenous Given 10/6/21 1423)   oxyCODONE (ROXICODONE) tablet 5 mg (5 mg Oral Given 10/6/21 1446)         NEW PRESCRIPTIONS STARTED AT TODAY'S ER VISIT     Medication List      There are no discharge medications for this visit.             CONDITION:  Stable, improving      DISPOSITION:  discharge home with          =================================================================  =================================================================    HPI    Patient information was obtained from: Patient    Use of Intrepreter: N/A      Raf Dalton is a 71 year old male with history of type 2 diabetes mellitus, AAA, COPD, and cancer of head of pancreas who presents to the ER with complaints of generalized weakness.     Per chart review, patient was seen at [facility] on  10/4/21. 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 His medical oncologist is Dr. Andrea Loya. Not on therapy yet as workup is still ongoing.    Patient with a recent diagnosis of pancreatic cancer. He has not had any therapy yet for this cancer due to the recent diagnosis and current workup.     He is here today as the last 2 weeks he has felt weak and light headed, with his light headedness worsening yesterday into today. Patient notes that when he stands up he feels a little unsteady on his feet. He denies near syncope or fall. Patient has chronic abdominal pain associated with his pancreatic cancer, and denies any worsening or changing pain. He has been eating and drinking fine and has had at least 3 glasses of water today. Patient reports he ate well yesterday.    He denies headache, fever, cough, shortness of breath, vomiting, diarrhea, or any additional complaints at this time.       REVIEW OF SYSTEMS  Review of Systems   Constitutional: Positive for fatigue. Negative for fever.   HENT: Negative for trouble swallowing.    Respiratory: Negative for cough and shortness of breath.    Cardiovascular: Negative for chest pain.   Gastrointestinal: Positive for abdominal pain (chronic and unchanged). Negative for diarrhea, nausea and vomiting.   Genitourinary: Negative for dysuria.   Allergic/Immunologic: Negative for immunocompromised state.   Neurological: Positive for light-headedness. Negative for dizziness, syncope, weakness, numbness and headaches.   Psychiatric/Behavioral: Negative for confusion.   All other systems reviewed and are negative.          PAST MEDICAL HISTORY:  Past Medical History:   Diagnosis Date     Abdominal aneurysm without mention of rupture     Created by Conversion      Cervicalgia     Created by Conversion      Chest pain 08/19/2019     Chronic airway obstruction, not elsewhere classified     Created by Conversion      Diabetes mellitus, type II (H)      High  cholesterol      Hypertrophy of prostate with urinary obstruction and other lower urinary tract symptoms (LUTS)     Created by Conversion      Hypertrophy of prostate without urinary obstruction and other lower urinary tract symptoms (LUTS)     Created by Conversion      Hypoxemia     Created by Conversion      Obstructive sleep apnea (adult) (pediatric)     Created by Conversion      Other abnormal glucose     Created by Conversion      Other and unspecified hyperlipidemia     Created by Conversion      Shortness of breath      Sprain of unspecified site of sacroiliac region     Created by Conversion      Unspecified cataract     Created by Conversion      Unspecified essential hypertension     Created by Conversion      Unspecified vitamin D deficiency     Created by Conversion          PAST SURGICAL HISTORY:  Past Surgical History:   Procedure Laterality Date     ABDOMINAL AORTIC ANEURYSM REPAIR  04/09/2019     ENDOSCOPIC RETROGRADE CHOLANGIOPANCREATOGRAM N/A 9/26/2021    Procedure: ENDOSCOPIC RETROGRADE CHOLANGIOPANCREATOGRAPHY, BILIARY SPHINCTEROTOMY,;  Surgeon: Gabriel Garza MD;  Location: [facility] OR     ESOPHAGOSCOPY, GASTROSCOPY, DUODENOSCOPY (EGD), COMBINED N/A 9/26/2021    Procedure: ESOPHAGOGASTRODUODENOSCOPY, WITH ENDOSCOPIC ULTRASOUND, fine needle aspiration OF PANCREATIC HEA D MASS;  Surgeon: Gabriel Garza MD;  Location: [facility] OR     FINGER SURGERY           CURRENT MEDICATIONS:    Prior to Admission medications    Medication Sig Start Date End Date Taking? Authorizing Provider   ACCU-CHEK TORI Misc [ACCU-CHEK TORI MISC]  10/9/17   Provider, Historical   ACCU-CHEK SMARTVIEW TEST STRIP strips [ACCU-CHEK SMARTVIEW TEST STRIP STRIPS] TEST BLOOD SUGAR TWICE DAILY 6/20/20   Bekah Castanon MD   acetaminophen (TYLENOL) 500 MG tablet Take 500-1,000 mg by mouth nightly as needed  4/17/19   Provider, Historical   albuterol (PROAIR HFA;PROVENTIL HFA;VENTOLIN HFA) 90 mcg/actuation inhaler  [ALBUTEROL (PROAIR HFA;PROVENTIL HFA;VENTOLIN HFA) 90 MCG/ACTUATION INHALER] Inhale 2 puffs every 6 (six) hours as needed for wheezing. 3/21/18   Bekah Castanon MD   albuterol (PROVENTIL) 2.5 mg /3 mL (0.083 %) nebulizer solution [ALBUTEROL (PROVENTIL) 2.5 MG /3 ML (0.083 %) NEBULIZER SOLUTION] Take 3 mL (2.5 mg total) by nebulization every 6 (six) hours as needed for wheezing or shortness of breath. 2/15/19   Bekah Castanon MD   aspirin 81 MG EC tablet [ASPIRIN 81 MG EC TABLET] Take 1 tablet (81 mg total) by mouth daily. 4/1/16   Analisa Katz   budesonide-formoterol (SYMBICORT) 80-4.5 mcg/actuation inhaler [BUDESONIDE-FORMOTEROL (SYMBICORT) 80-4.5 MCG/ACTUATION INHALER] Inhale 2 puffs 2 (two) times a day. 10/16/17   Bekah Castanon MD   buPROPion (WELLBUTRIN SR) 150 MG 12 hr tablet [BUPROPION (WELLBUTRIN SR) 150 MG 12 HR TABLET] TAKE 1 TABLET TWICE DAILY 6/1/20   Bekah Castanon MD   cholecalciferol, vitamin D3, (VITAMIN D3) 5,000 unit Tab [CHOLECALCIFEROL, VITAMIN D3, (VITAMIN D3) 5,000 UNIT TAB] Take 1 tablet (5,000 Units total) by mouth daily.  Patient taking differently: Take 1,000 Units by mouth daily  10/16/17   Bekah Castanon MD   cyanocobalamin 1000 MCG tablet [CYANOCOBALAMIN 1000 MCG TABLET] Take 1 tablet (1,000 mcg total) by mouth daily. 11/3/20   Bekah Castanon MD   fluticasone propionate (FLONASE) 50 mcg/actuation nasal spray [FLUTICASONE PROPIONATE (FLONASE) 50 MCG/ACTUATION NASAL SPRAY] 1 spray into each nostril 2 (two) times a day as needed. 4/17/19   Bekah Castanon MD   gabapentin (NEURONTIN) 300 MG capsule [GABAPENTIN (NEURONTIN) 300 MG CAPSULE] Take 3 capsules in the morning, 2 in the afternoon and 3 capsules in the evening  Patient taking differently: Take 900 mg by mouth 2 times daily  3/16/20   Bekah Castanon MD   generic lancets (ACCU-CHEK FASTCLIX) [GENERIC LANCETS (ACCU-CHEK FASTCLIX)] USE TO TEST TWICE DAILY 10/16/17   Bekah Castanon MD   inhalational spacing device Spcr  [INHALATIONAL SPACING DEVICE SPCR] Use two times a day with symbicort 4/17/19   Bekah Castanon MD   ketoconazole (NIZORAL) 2 % cream [KETOCONAZOLE (NIZORAL) 2 % CREAM] ketoconazole 2 % topical cream   APPLY TO FUNGUS NAILS DAILY FOR 90 DAYS 5/20/21   Provider, Historical   lisinopriL (PRINIVIL,ZESTRIL) 20 MG tablet [LISINOPRIL (PRINIVIL,ZESTRIL) 20 MG TABLET] TAKE 1 TABLET TWICE DAILY FOR BLOOD PRESSURE 8/25/20   Bekah Castanon MD   metFORMIN (GLUCOPHAGE-XR) 500 MG 24 hr tablet [METFORMIN (GLUCOPHAGE-XR) 500 MG 24 HR TABLET] TAKE 2 TABLETS TWICE DAILY 11/20/20   Bekah Castanon MD   NARCAN 4 MG/0.1ML nasal spray  10/3/21   Reported, Patient   oxybutynin (DITROPAN) 5 MG tablet  10/3/21   Reported, Patient   oxybutynin (DITROPAN) 5 MG tablet Take 2.5 mg by mouth 2 times daily  4/26/21   Provider, Historical   oxyCODONE (ROXICODONE) 5 MG tablet Take 5 mg by mouth every 6 hours as needed for severe pain    Unknown, Entered By History   sildenafil (REVATIO) 20 mg tablet [SILDENAFIL (REVATIO) 20 MG TABLET] Take by mouth as needed.        5/2/19   Provider, Historical   simvastatin (ZOCOR) 40 MG tablet [SIMVASTATIN (ZOCOR) 40 MG TABLET] TAKE 1 AND 1/2 TABLETS EVERY DAY  Patient taking differently: At Bedtime  11/26/20   Bekah Castanon MD   terazosin (HYTRIN) 10 MG capsule [TERAZOSIN (HYTRIN) 10 MG CAPSULE] TAKE 2 CAPSULES AT BEDTIME 3/17/21   Fifi Driscoll MD   testosterone (ANDROGEL) 1.62 % (40.5 mg/2.5 gram) GlPk [TESTOSTERONE (ANDROGEL) 1.62 % (40.5 MG/2.5 GRAM) GLPK] Apply 1 pck daily 10/16/17 9/25/21  Bekah Csatanon MD   tiotropium (SPIRIVA WITH HANDIHALER) 18 mcg inhalation capsule [TIOTROPIUM (SPIRIVA WITH HANDIHALER) 18 MCG INHALATION CAPSULE] INHALE CONTENTS OF 1 CAPSULE DAILY 10/16/17   Bekah Castanon MD   traZODone (DESYREL) 50 MG tablet [TRAZODONE (DESYREL) 50 MG TABLET] Take 1 tablet (50 mg total) by mouth at bedtime as needed for sleep.  Patient not taking: Reported on 8/16/2021 3/14/21    Fifi Driscoll MD   TRULICITY 0.75 mg/0.5 mL PnIj [TRULICITY 0.75 MG/0.5 ML PNIJ] INJECT  0.75MG  UNDER  THE  SKIN EVERY 7 DAYS  Patient taking differently: Inject 0.75 mg Subcutaneous every 7 days mondays 3/11/21   Bekah Castanon MD         ALLERGIES:  No Known Allergies      FAMILY HISTORY:  Family History   Problem Relation Age of Onset     Snoring Father          SOCIAL HISTORY:  Social History     Socioeconomic History     Marital status:      Spouse name: Not on file     Number of children: Not on file     Years of education: Not on file     Highest education level: Not on file   Occupational History     Not on file   Tobacco Use     Smoking status: Current Some Day Smoker     Packs/day: 0.50     Years: 55.00     Pack years: 27.50     Types: Cigarettes     Last attempt to quit: 10/18/2017     Years since quitting: 3.9     Smokeless tobacco: Never Used     Tobacco comment: .5PPD   Substance and Sexual Activity     Alcohol use: Yes     Alcohol/week: 21.0 standard drinks     Comment: Alcoholic Drinks/day: 21 beers per week     Drug use: No     Sexual activity: Never     Partners: Female     Birth control/protection: None   Other Topics Concern     Parent/sibling w/ CABG, MI or angioplasty before 65F 55M? Not Asked   Social History Narrative     Not on file     Social Determinants of Health     Financial Resource Strain:      Difficulty of Paying Living Expenses:    Food Insecurity:      Worried About Running Out of Food in the Last Year:      Ran Out of Food in the Last Year:    Transportation Needs:      Lack of Transportation (Medical):      Lack of Transportation (Non-Medical):    Physical Activity:      Days of Exercise per Week:      Minutes of Exercise per Session:    Stress:      Feeling of Stress :    Social Connections:      Frequency of Communication with Friends and Family:      Frequency of Social Gatherings with Friends and Family:      Attends [employer] Services:      Active Member of  "Clubs or Organizations:      Attends Club or Organization Meetings:      Marital Status:    Intimate Partner Violence:      Fear of Current or Ex-Partner:      Emotionally Abused:      Physically Abused:      Sexually Abused:          VITALS:  Patient Vitals for the past 24 hrs:   BP Temp Temp src Pulse Resp SpO2 Height Weight   10/06/21 1546 126/71 -- -- 68 16 95 % -- --   10/06/21 1035 120/67 98.4  F (36.9  C) Temporal 72 16 96 % 1.74 m (5' 8.5\") 84.3 kg (185 lb 14.4 oz)       Wt Readings from Last 3 Encounters:   10/06/21 84.3 kg (185 lb 14.4 oz)   10/04/21 84.3 kg (185 lb 14.4 oz)   09/25/21 84.1 kg (185 lb 4.8 oz)         PHYSICAL EXAM    Constitutional:  Well developed, Well nourished, NAD, GCS 15  HENT:  Normocephalic, Atraumatic, Bilateral external ears normal, Nose normal. Neck- Normal range of motion, No tenderness, Supple, No stridor.   Eyes:  PERRL, EOMI, Conjunctiva normal, No discharge.  Respiratory:  Normal breath sounds, No respiratory distress, No wheezing, Speaks full sentences easily. No cough.   Cardiovascular:  Normal heart rate, Regular rhythm, No murmurs, No rubs, No gallops.   GI:  No excessive obesity.  Bowel sounds normal, Soft, No tenderness, No masses, No flank tenderness. No rebound or guarding.   : deferred  Musculoskeletal: 2+ DP pulses. No cyanosis, No clubbing. Good range of motion in all major joints. No major deformities noted.   Integument:  Warm, Dry, No erythema, No rash.  No petechiae.  Neurologic:  Alert & oriented x 3, Normal motor function, Normal sensory function, No focal deficits noted.   Psychiatric:  Affect normal, Cooperative          LAB:  All pertinent labs reviewed and interpreted.  Recent Results (from the past 24 hour(s))   Basic metabolic panel    Collection Time: 10/06/21 11:18 AM   Result Value Ref Range    Sodium 135 (L) 136 - 145 mmol/L    Potassium 4.2 3.5 - 5.0 mmol/L    Chloride 100 98 - 107 mmol/L    Carbon Dioxide (CO2) 23 22 - 31 mmol/L    Anion Gap " 12 5 - 18 mmol/L    Urea Nitrogen 13 8 - 28 mg/dL    Creatinine 1.23 0.70 - 1.30 mg/dL    Calcium 9.2 8.5 - 10.5 mg/dL    Glucose 279 (H) 70 - 125 mg/dL    GFR Estimate 58 (L) >60 mL/min/1.73m2   Troponin I (now)    Collection Time: 10/06/21 11:18 AM   Result Value Ref Range    Troponin I <0.01 0.00 - 0.29 ng/mL   INR    Collection Time: 10/06/21 11:18 AM   Result Value Ref Range    INR 1.01 0.85 - 1.15   PTT    Collection Time: 10/06/21 11:18 AM   Result Value Ref Range    aPTT 30 22 - 38 Seconds   Hepatic function panel    Collection Time: 10/06/21 11:18 AM   Result Value Ref Range    Bilirubin Total 4.3 (H) 0.0 - 1.0 mg/dL    Bilirubin Direct 2.6 (H) <=0.5 mg/dL    Protein Total 6.5 6.0 - 8.0 g/dL    Albumin 2.8 (L) 3.5 - 5.0 g/dL    Alkaline Phosphatase 220 (H) 45 - 120 U/L    AST 32 0 - 40 U/L    ALT 49 (H) 0 - 45 U/L   Lipase    Collection Time: 10/06/21 11:18 AM   Result Value Ref Range    Lipase 153 (H) 0 - 52 U/L   TSH with free T4 reflex    Collection Time: 10/06/21 11:18 AM   Result Value Ref Range    TSH 1.38 0.30 - 5.00 uIU/mL   Magnesium    Collection Time: 10/06/21 11:18 AM   Result Value Ref Range    Magnesium 2.0 1.8 - 2.6 mg/dL   CBC with platelets and differential    Collection Time: 10/06/21 11:18 AM   Result Value Ref Range    WBC Count 6.7 4.0 - 11.0 10e3/uL    RBC Count 3.29 (L) 4.40 - 5.90 10e6/uL    Hemoglobin 10.3 (L) 13.3 - 17.7 g/dL    Hematocrit 32.7 (L) 40.0 - 53.0 %    MCV 99 78 - 100 fL    MCH 31.3 26.5 - 33.0 pg    MCHC 31.5 31.5 - 36.5 g/dL    RDW 13.9 10.0 - 15.0 %    Platelet Count 223 150 - 450 10e3/uL    % Neutrophils 75 %    % Lymphocytes 13 %    % Monocytes 8 %    % Eosinophils 2 %    % Basophils 1 %    % Immature Granulocytes 1 %    NRBCs per 100 WBC 0 <1 /100    Absolute Neutrophils 5.0 1.6 - 8.3 10e3/uL    Absolute Lymphocytes 0.9 0.8 - 5.3 10e3/uL    Absolute Monocytes 0.5 0.0 - 1.3 10e3/uL    Absolute Eosinophils 0.1 0.0 - 0.7 10e3/uL    Absolute Basophils 0.1 0.0 - 0.2  10e3/uL    Absolute Immature Granulocytes 0.1 (H) <=0.0 10e3/uL    Absolute NRBCs 0.0 10e3/uL   Extra Green Top (Lithium Heparin) Tube    Collection Time: 10/06/21 11:18 AM   Result Value Ref Range    Hold Specimen JIC    UA with Microscopic reflex to Culture    Collection Time: 10/06/21 12:03 PM    Specimen: Urine, Clean Catch   Result Value Ref Range    Color Urine Yellow Colorless, Straw, Light Yellow, Yellow    Appearance Urine Clear Clear    Glucose Urine 200  (A) Negative mg/dL    Bilirubin Urine Negative Negative    Ketones Urine Negative Negative mg/dL    Specific Gravity Urine 1.009 1.001 - 1.030    Blood Urine Negative Negative    pH Urine 5.5 5.0 - 7.0    Protein Albumin Urine Negative Negative mg/dL    Urobilinogen Urine <2.0 <2.0 mg/dL    Nitrite Urine Negative Negative    Leukocyte Esterase Urine Negative Negative    Mucus Urine Present (A) None Seen /LPF    RBC Urine <1 <=2 /HPF    WBC Urine <1 <=5 /HPF    Hyaline Casts Urine 3 (H) <=2 /LPF   ECG 12-LEAD WITH MUSE (LHE)    Collection Time: 10/06/21  3:32 PM   Result Value Ref Range    Systolic Blood Pressure 126 mmHg    Diastolic Blood Pressure 71 mmHg    Ventricular Rate 60 BPM    Atrial Rate 60 BPM    NE Interval 216 ms    QRS Duration 96 ms     ms    QTc 422 ms    P Axis -5 degrees    R AXIS -32 degrees    T Axis 50 degrees    Interpretation ECG       Sinus rhythm with 1st degree A-V block  Left axis deviation  Abnormal ECG  When compared with ECG of 19-AUG-2019 10:32,  Right bundle branch block is no longer Present  Confirmed by SEE ED PROVIDER NOTE FOR, ECG INTERPRETATION ([id number]),  Eleazar Westbrook ([id number]) on 10/6/2021 5:24:54 PM     Glucose by meter    Collection Time: 10/06/21  3:36 PM   Result Value Ref Range    GLUCOSE BY METER POCT 218 (H) 70 - 99 mg/dL       Lab Results   Component Value Date    ABORH A POS 04/09/2019           RADIOLOGY:  Reviewed all pertinent imaging. Please see official radiology report.    MR Brain COW  Carotid wwo Contrast   Final Result   IMPRESSION:   HEAD MRI:    1.  No acute/subacute infarction, intracranial hemorrhage, mass effect, hydrocephalus, or abnormal enhancement.   2.  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   3.  Moderate global brain parenchymal volume loss with additional presumed sequelae of mild chronic small vessel ischemic disease.      HEAD MRA:    1.  Normal MRA [place].      NECK MRA:   1.  Normal neck MRA.      Chest XR,  PA & LAT   Final Result   IMPRESSION:    No acute abnormality or significant interval change as compared to the previous study.      Emphysematous change involving the lungs. No focal pulmonary infiltrate, pleural effusion, or pneumothorax. The cardiac size and mediastinal contours appear within normal limits.                 EKG:    Performed at: 15:32p    Impression: Sinus rhythm at 60 bpm.  Flipped T waves noted in lead aVR and V1-V2.  IA interval 216 ms consistent with first-degree AV block.  QRS 96 ms,  ms.  Overall EKG does appear similar to one from August 19, 2019.    I have independently reviewed and interpreted the EKG(s) documented above.        PROCEDURES:  none        I, Genesis Lester, am serving as a scribe to document services personally performed by Dr. Sofi Serrano based on my observation and the provider's statements to me. I, Dr. Sofi Serrano MD attest that Genesis Lester is acting in a scribe capacity, has observed my performance of the services and has documented them in accordance with my direction.        Sofi Serrano M.D. [facility]  Emergency Medicine and Medical Toxicology  [facility] EMERGENCY ROOM  [address] [id number] [phone]  Dept: [phone]           Sofi Serrano MD  10/06/21 [id number]

## 2021-10-08 NOTE — PROGRESS NOTES
Oncology RN Care Coordination Note:     Outgoing Call:  This writer called patient to introduce myself as RNCC, there was no answer at the number provided, no voicemail available to leave a message.  Unable to introduce myself to this patient at this time.     Beatris Hill, RN BSN   [facility]  Nurse Coordinator

## 2021-10-09 NOTE — PROGRESS NOTES
[facility]  Medical Oncology Follow up Visit Note    Patient name: Raf Dalton  YOB: 1950    Oncologic History:  Diagnosis/ stage of cancer: Advanced pancreatic ductal adenocarcinoma  Prior treatment(s): -  Current treatment(s):  -  Treatment intent: possibly palliative    Care Team  Medical oncologist: Andrea Loya  Surgical oncologist:  Coty, likely Dr. Uriel Dennis (appt 11/15/21)  GI: Dr. Gabriel Garza   Pall care: Dr. Jenny Oseguera   Other members of care team: PCP, Dr. Barrett Arambula  Primary caregiver at home/ contact:  Wife-- primary caretaker for wife  Daughter, Kasia,     Reason for consultation/ patient visit:  Jaundice and newly diagnosed pancreatic cancer    History of presenting illness:  Mr. Raf Dalton is a 71 year old man.    HTN, DM2, obesity and sleep apnea, BPH, heavy nicotine use and COPD, AAA repair in 2019, neuropathy.     Over 2-3 months, some belly pains.  Then in 9/2021, noticed jaundice.  Went to ED on 9/25/2021. Bili 25 with CT A/P with biliary dilation with HOP mass.    EUS/ ERCP on 9/25  EUS:  HOP mass measured 53 mm by 45 mm in maximal cross-sectional diameter. The outer margins were irregular. There was sonographic evidence suggesting invasion into the portal vein (manifested by abutment), the superior mesenteric vein (manifested by encasement) and the splenic vein (manifested by abutment).   ERCP:  A single segmental biliary stricture was found in the lower third of the main bile duct. The stricture was malignant appearing. The upper third of the main bile duct and middle third of the main bile duct were dilated, with a mass causing an obstruction.- A biliary sphincterotomy was performed and one covered metal stent was placed into the common bile duct.     FNA     POSITIVE FOR MALIGNANT CELLS    CELL MORPHOLOGY IS CONSISTENT WITH DUCTAL ADENOCARCINOMA OF PANCREAS     Colonoscopy 9/27/18   revealed internal hemorrhoids, diverticulosis, and tubular  adenoma X2 removed. Recommended to have a repeat colonoscopy in 3 years (scheduled for 11/2021).      We met 10/4/2021 to establish care, goal to complete staging and meet again  At that time, reported   Not been eating too much-- 30 lb weight loss  No pain per se but some discomfort in belly  Feeling dizzy and weak overall    Geriatric Assessment  Functional status (ECOG performance status): 1-2  ADL (transferring, eating):  yes  IADL (finances, cooking, driving): yes  Objective function (timed get up and go test, <12 seconds): 10 seconds  Falls in past 6 months:  none  Cognitive function:  ok  Incontinence: none    ER presentation 10/5/2021 for dizziness, MRI brain negative    CT chest 10/8/2021:  1.  Multiple bilateral pulmonary nodules are new and increased when compared to 06/29/2021, suspicious for pulmonary metastases.  2.  There are also 2 nodules in the right upper lobe not appreciably changed from previous.    Interval history:  Video visit  Wife also on call today, Bhavana Munoz      Review of Systems: 14 point ROS negative other than the symptoms noted above in the HPI.    Past medical history:  HTN, DM2, obesity and sleep apnea on CPAP, BPH, heavy nicotine use and COPD,  neuropathy. ,     Past surgical history:  AAA repair in 2019    Social history:   80 pack year smoking history, still smoking  History of alcohol use  Lives with wife at home - Bhavana-- double amputee.  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  Kasia, daughter, 59 yo, lives with [place], [place]  Luiza, daughter 2, lives in [place] also  Younger son, Gabriel, lives in [place]      Family history:  Mother-lung cancer, HTN  Father-  MI    Allergies:   No Known Allergies    Outpatient medications:     Current Outpatient Medications:      ACCU-CHEK TORI Misc, [ACCU-CHEK TORI MISC] , Disp: , Rfl:      ACCU-CHEK SMARTVIEW TEST STRIP strips, [ACCU-CHEK SMARTVIEW TEST STRIP STRIPS]  TEST BLOOD SUGAR TWICE DAILY, Disp: 200 strip, Rfl: 3     acetaminophen (TYLENOL) 500 MG tablet, Take 500-1,000 mg by mouth nightly as needed , Disp: , Rfl:      albuterol (PROAIR HFA;PROVENTIL HFA;VENTOLIN HFA) 90 mcg/actuation inhaler, [ALBUTEROL (PROAIR HFA;PROVENTIL HFA;VENTOLIN HFA) 90 MCG/ACTUATION INHALER] Inhale 2 puffs every 6 (six) hours as needed for wheezing., Disp: 1 each, Rfl: 6     albuterol (PROVENTIL) 2.5 mg /3 mL (0.083 %) nebulizer solution, [ALBUTEROL (PROVENTIL) 2.5 MG /3 ML (0.083 %) NEBULIZER SOLUTION] Take 3 mL (2.5 mg total) by nebulization every 6 (six) hours as needed for wheezing or shortness of breath., Disp: 25 vial, Rfl: 2     aspirin 81 MG EC tablet, [ASPIRIN 81 MG EC TABLET] Take 1 tablet (81 mg total) by mouth daily., Disp: , Rfl: 0     budesonide-formoterol (SYMBICORT) 80-4.5 mcg/actuation inhaler, [BUDESONIDE-FORMOTEROL (SYMBICORT) 80-4.5 MCG/ACTUATION INHALER] Inhale 2 puffs 2 (two) times a day., Disp: 1 Inhaler, Rfl: 12     buPROPion (WELLBUTRIN SR) 150 MG 12 hr tablet, [BUPROPION (WELLBUTRIN SR) 150 MG 12 HR TABLET] TAKE 1 TABLET TWICE DAILY, Disp: 180 tablet, Rfl: 2     cholecalciferol, vitamin D3, (VITAMIN D3) 5,000 unit Tab, [CHOLECALCIFEROL, VITAMIN D3, (VITAMIN D3) 5,000 UNIT TAB] Take 1 tablet (5,000 Units total) by mouth daily. (Patient taking differently: Take 1,000 Units by mouth daily ), Disp: 90 tablet, Rfl: 3     cyanocobalamin 1000 MCG tablet, [CYANOCOBALAMIN 1000 MCG TABLET] Take 1 tablet (1,000 mcg total) by mouth daily., Disp: 100 tablet, Rfl: 3     fluticasone propionate (FLONASE) 50 mcg/actuation nasal spray, [FLUTICASONE PROPIONATE (FLONASE) 50 MCG/ACTUATION NASAL SPRAY] 1 spray into each nostril 2 (two) times a day as needed., Disp: 16 g, Rfl: 11     gabapentin (NEURONTIN) 300 MG capsule, [GABAPENTIN (NEURONTIN) 300 MG CAPSULE] Take 3 capsules in the morning, 2 in the afternoon and 3 capsules in the evening (Patient taking differently: Take 900 mg by mouth 2  times daily ), Disp: 720 capsule, Rfl: 3     generic lancets (ACCU-CHEK FASTCLIX), [GENERIC LANCETS (ACCU-CHEK FASTCLIX)] USE TO TEST TWICE DAILY, Disp: 200 each, Rfl: 3     inhalational spacing device Spcr, [INHALATIONAL SPACING DEVICE SPCR] Use two times a day with symbicort, Disp: 1 each, Rfl: 0     ketoconazole (NIZORAL) 2 % cream, [KETOCONAZOLE (NIZORAL) 2 % CREAM] ketoconazole 2 % topical cream   APPLY TO FUNGUS NAILS DAILY FOR 90 DAYS, Disp: , Rfl:      lisinopriL (PRINIVIL,ZESTRIL) 20 MG tablet, [LISINOPRIL (PRINIVIL,ZESTRIL) 20 MG TABLET] TAKE 1 TABLET TWICE DAILY FOR BLOOD PRESSURE, Disp: 180 tablet, Rfl: 1     metFORMIN (GLUCOPHAGE-XR) 500 MG 24 hr tablet, [METFORMIN (GLUCOPHAGE-XR) 500 MG 24 HR TABLET] TAKE 2 TABLETS TWICE DAILY, Disp: 360 tablet, Rfl: 3     NARCAN 4 MG/0.1ML nasal spray, , Disp: , Rfl:      oxybutynin (DITROPAN) 5 MG tablet, , Disp: , Rfl:      oxybutynin (DITROPAN) 5 MG tablet, Take 2.5 mg by mouth 2 times daily , Disp: , Rfl:      oxyCODONE (ROXICODONE) 5 MG tablet, Take 5 mg by mouth every 6 hours as needed for severe pain, Disp: , Rfl:      sildenafil (REVATIO) 20 mg tablet, [SILDENAFIL (REVATIO) 20 MG TABLET] Take by mouth as needed.       , Disp: , Rfl: 3     simvastatin (ZOCOR) 40 MG tablet, [SIMVASTATIN (ZOCOR) 40 MG TABLET] TAKE 1 AND 1/2 TABLETS EVERY DAY (Patient taking differently: At Bedtime ), Disp: 135 tablet, Rfl: 3     terazosin (HYTRIN) 10 MG capsule, [TERAZOSIN (HYTRIN) 10 MG CAPSULE] TAKE 2 CAPSULES AT BEDTIME, Disp: 180 capsule, Rfl: 2     testosterone (ANDROGEL) 1.62 % (40.5 mg/2.5 gram) GlPk, [TESTOSTERONE (ANDROGEL) 1.62 % (40.5 MG/2.5 GRAM) GLPK] Apply 1 pck daily, Disp: 75 g, Rfl: 5     tiotropium (SPIRIVA WITH HANDIHALER) 18 mcg inhalation capsule, [TIOTROPIUM (SPIRIVA WITH HANDIHALER) 18 MCG INHALATION CAPSULE] INHALE CONTENTS OF 1 CAPSULE DAILY, Disp: 30 capsule, Rfl: 5     traZODone (DESYREL) 50 MG tablet, [TRAZODONE (DESYREL) 50 MG TABLET] Take 1 tablet  (50 mg total) by mouth at bedtime as needed for sleep. (Patient not taking: Reported on 8/16/2021), Disp: 90 tablet, Rfl: 1     TRULICITY 0.75 mg/0.5 mL PnIj, [TRULICITY 0.75 MG/0.5 ML PNIJ] INJECT  0.75MG  UNDER  THE  SKIN EVERY 7 DAYS (Patient taking differently: Inject 0.75 mg Subcutaneous every 7 days mondays), Disp: 12 Syringe, Rfl: 3  No current facility-administered medications for this visit.      Physical examination:    General: patient appears well in no acute distress, alert and oriented, speech clear and fluid  Skin: no visualized rash or lesions on visualized skin  Resp: Appears to be breathing comfortably without accessory muscle usage, speaking in full sentences, no audible wheezes or cough.  Psych: Coherent speech, normal rate and volume, able to articulate logical thoughts, able to abstract reason, no tangential thoughts, no hallucinations or delusions.        Lab data:  I have personally reviewed the following lab data: CBC CMP which are notable for  Cr 1, albumin 3    Radiology data:  I have personally reviewed the images of / or the following radiology data: CT A/P which are notable for  5 cm pancreatic head mass with biliary dilation    Pathology and other data:  I have personally reviewed and interpreted the following data: pancreatic head mass notable for adenocarcinoma        Assessment and Plan:  Mr. Raf Dalton is a 71 year old male with multi-morbidity (AAA repair, HTN, DM, COPD, nicotine use) who presented with jaundice (bili 25) and has since been found to have 5 cm head of pancreas ductal adenocarcinoma with biliary obstruction s/p stenting, CT chest with multiple new pulm nodules (in addition to chronic nodules).  Bili has since come down to 4.  He has ECOG 1 going on 2, and several co-morbidities.  Already 1 ER visit for dizziness.    Problem list:  Likely advanced/metastatic pancreatic ductal adenocarcinoma with locally advanced primary  Cancer associated biliary  obstruction  Cancer associated pain  Polypharmacy      Plan:  Port is set up for 10/18  Meet me to chat on 10/18 about options in person  NGS on tumor pending  Given bili may not normalize, we will plan for FOLFOX  Seeing pall care on 10/19--   Consider genetics and nutrition consult  I discussed clearly with them today that given likely advanced cancer, biliary obstruction, multiple co-morbidities, that he remains at high-risk of clinical decompensation. We discussed that time may be limited, especially if he tolerates chemo poorly.  Initially considered him for or PANOVA 3 trial, which is only for locally advanced pancreatic cancer, but now not eligible      The patient and family asked numerous excellent questions which I answered to the best of my abilities. At the completion of our consultation, the patient and accompanying caregivers had an excellent understanding of the plan. They have our contact information for any further questions or concerns and of course, as always, we are available in the interim.       45 minutes spent on the date of the encounter doing chart review, review of test results, interpretation of tests, patient visit, documentation, discussion with other provider(s) and discussion with family         Andrea Loya M.D.   of Medicine  Division of Hematology, Oncology and Transplantation  [facility]

## 2021-10-11 PROBLEM — F10.20 ALCOHOL DEPENDENCE (H): Status: ACTIVE | Noted: 2021-01-01

## 2021-10-11 NOTE — LETTER
10/11/2021         RE: Raf Dalton  [address]  [place] [address]        Dear Colleague,    Thank you for referring your patient, Raf Dalton, to the [facility]. Please see a copy of my visit note below.    [facility]  Medical Oncology Follow up Visit Note    Patient name: Raf Dalton  YOB: 1950    Oncologic History:  Diagnosis/ stage of cancer: Advanced pancreatic ductal adenocarcinoma  Prior treatment(s): -  Current treatment(s):  -  Treatment intent: possibly palliative    Care Team  Medical oncologist: Andrea Loya  Surgical oncologist:  Coty, likely Dr. Uriel Dennis (appt 11/15/21)  GI: Dr. Gabriel Adrian care: Dr. Jenny Oseguera   Other members of care team: PCP, Dr. Barrett Arambula  Primary caregiver at home/ contact:  Wife-- primary caretaker for wife  Daughter, Kasia,     Reason for consultation/ patient visit:  Jaundice and newly diagnosed pancreatic cancer    History of presenting illness:  Mr. Raf Dalton is a 71 year old man.    HTN, DM2, obesity and sleep apnea, BPH, heavy nicotine use and COPD, AAA repair in 2019, neuropathy.     Over 2-3 months, some belly pains.  Then in 9/2021, noticed jaundice.  Went to ED on 9/25/2021. Bili 25 with CT A/P with biliary dilation with HOP mass.    EUS/ ERCP on 9/25  EUS:  HOP mass measured 53 mm by 45 mm in maximal cross-sectional diameter. The outer margins were irregular. There was sonographic evidence suggesting invasion into the portal vein (manifested by abutment), the superior mesenteric vein (manifested by encasement) and the splenic vein (manifested by abutment).   ERCP:  A single segmental biliary stricture was found in the lower third of the main bile duct. The stricture was malignant appearing. The upper third of the main bile duct and middle third of the main bile duct were dilated, with a mass causing an obstruction.- A biliary sphincterotomy was performed and one  covered metal stent was placed into the common bile duct.     FNA     POSITIVE FOR MALIGNANT CELLS    CELL MORPHOLOGY IS CONSISTENT WITH DUCTAL ADENOCARCINOMA OF PANCREAS     Colonoscopy 9/27/18   revealed internal hemorrhoids, diverticulosis, and tubular adenoma X2 removed. Recommended to have a repeat colonoscopy in 3 years (scheduled for 11/2021).      We met 10/4/2021 to establish care, goal to complete staging and meet again  At that time, reported   Not been eating too much-- 30 lb weight loss  No pain per se but some discomfort in belly  Feeling dizzy and weak overall    Geriatric Assessment  Functional status (ECOG performance status): 1-2  ADL (transferring, eating):  yes  IADL (finances, cooking, driving): yes  Objective function (timed get up and go test, <12 seconds): 10 seconds  Falls in past 6 months:  none  Cognitive function:  ok  Incontinence: none    ER presentation 10/5/2021 for dizziness, MRI brain negative    CT chest 10/8/2021:  1.  Multiple bilateral pulmonary nodules are new and increased when compared to 06/29/2021, suspicious for pulmonary metastases.  2.  There are also 2 nodules in the right upper lobe not appreciably changed from previous.    Interval history:  Video visit  Wife also on call today, Bhavana Munoz      Review of Systems: 14 point ROS negative other than the symptoms noted above in the HPI.    Past medical history:  HTN, DM2, obesity and sleep apnea on CPAP, BPH, heavy nicotine use and COPD,  neuropathy. ,     Past surgical history:  AAA repair in 2019    Social history:   80 pack year smoking history, still smoking  History of alcohol use  Lives with wife at home - Bhavana-- double amputee.  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  Kasia, daughter, 59 yo, lives with [place], [place]  Luiza, daughter 2, lives in [place] also  Younger son, Gabriel, lives in [place]      Family history:  Mother-lung cancer,  HTN  Father-  MI    Allergies:   No Known Allergies    Outpatient medications:     Current Outpatient Medications:      ACCU-CHEK TORI Misc, [ACCU-CHEK TORI MISC] , Disp: , Rfl:      ACCU-CHEK SMARTVIEW TEST STRIP strips, [ACCU-CHEK SMARTVIEW TEST STRIP STRIPS] TEST BLOOD SUGAR TWICE DAILY, Disp: 200 strip, Rfl: 3     acetaminophen (TYLENOL) 500 MG tablet, Take 500-1,000 mg by mouth nightly as needed , Disp: , Rfl:      albuterol (PROAIR HFA;PROVENTIL HFA;VENTOLIN HFA) 90 mcg/actuation inhaler, [ALBUTEROL (PROAIR HFA;PROVENTIL HFA;VENTOLIN HFA) 90 MCG/ACTUATION INHALER] Inhale 2 puffs every 6 (six) hours as needed for wheezing., Disp: 1 each, Rfl: 6     albuterol (PROVENTIL) 2.5 mg /3 mL (0.083 %) nebulizer solution, [ALBUTEROL (PROVENTIL) 2.5 MG /3 ML (0.083 %) NEBULIZER SOLUTION] Take 3 mL (2.5 mg total) by nebulization every 6 (six) hours as needed for wheezing or shortness of breath., Disp: 25 vial, Rfl: 2     aspirin 81 MG EC tablet, [ASPIRIN 81 MG EC TABLET] Take 1 tablet (81 mg total) by mouth daily., Disp: , Rfl: 0     budesonide-formoterol (SYMBICORT) 80-4.5 mcg/actuation inhaler, [BUDESONIDE-FORMOTEROL (SYMBICORT) 80-4.5 MCG/ACTUATION INHALER] Inhale 2 puffs 2 (two) times a day., Disp: 1 Inhaler, Rfl: 12     buPROPion (WELLBUTRIN SR) 150 MG 12 hr tablet, [BUPROPION (WELLBUTRIN SR) 150 MG 12 HR TABLET] TAKE 1 TABLET TWICE DAILY, Disp: 180 tablet, Rfl: 2     cholecalciferol, vitamin D3, (VITAMIN D3) 5,000 unit Tab, [CHOLECALCIFEROL, VITAMIN D3, (VITAMIN D3) 5,000 UNIT TAB] Take 1 tablet (5,000 Units total) by mouth daily. (Patient taking differently: Take 1,000 Units by mouth daily ), Disp: 90 tablet, Rfl: 3     cyanocobalamin 1000 MCG tablet, [CYANOCOBALAMIN 1000 MCG TABLET] Take 1 tablet (1,000 mcg total) by mouth daily., Disp: 100 tablet, Rfl: 3     fluticasone propionate (FLONASE) 50 mcg/actuation nasal spray, [FLUTICASONE PROPIONATE (FLONASE) 50 MCG/ACTUATION NASAL SPRAY] 1 spray into each nostril 2  (two) times a day as needed., Disp: 16 g, Rfl: 11     gabapentin (NEURONTIN) 300 MG capsule, [GABAPENTIN (NEURONTIN) 300 MG CAPSULE] Take 3 capsules in the morning, 2 in the afternoon and 3 capsules in the evening (Patient taking differently: Take 900 mg by mouth 2 times daily ), Disp: 720 capsule, Rfl: 3     generic lancets (ACCU-CHEK FASTCLIX), [GENERIC LANCETS (ACCU-CHEK FASTCLIX)] USE TO TEST TWICE DAILY, Disp: 200 each, Rfl: 3     inhalational spacing device Spcr, [INHALATIONAL SPACING DEVICE SPCR] Use two times a day with symbicort, Disp: 1 each, Rfl: 0     ketoconazole (NIZORAL) 2 % cream, [KETOCONAZOLE (NIZORAL) 2 % CREAM] ketoconazole 2 % topical cream   APPLY TO FUNGUS NAILS DAILY FOR 90 DAYS, Disp: , Rfl:      lisinopriL (PRINIVIL,ZESTRIL) 20 MG tablet, [LISINOPRIL (PRINIVIL,ZESTRIL) 20 MG TABLET] TAKE 1 TABLET TWICE DAILY FOR BLOOD PRESSURE, Disp: 180 tablet, Rfl: 1     metFORMIN (GLUCOPHAGE-XR) 500 MG 24 hr tablet, [METFORMIN (GLUCOPHAGE-XR) 500 MG 24 HR TABLET] TAKE 2 TABLETS TWICE DAILY, Disp: 360 tablet, Rfl: 3     NARCAN 4 MG/0.1ML nasal spray, , Disp: , Rfl:      oxybutynin (DITROPAN) 5 MG tablet, , Disp: , Rfl:      oxybutynin (DITROPAN) 5 MG tablet, Take 2.5 mg by mouth 2 times daily , Disp: , Rfl:      oxyCODONE (ROXICODONE) 5 MG tablet, Take 5 mg by mouth every 6 hours as needed for severe pain, Disp: , Rfl:      sildenafil (REVATIO) 20 mg tablet, [SILDENAFIL (REVATIO) 20 MG TABLET] Take by mouth as needed.       , Disp: , Rfl: 3     simvastatin (ZOCOR) 40 MG tablet, [SIMVASTATIN (ZOCOR) 40 MG TABLET] TAKE 1 AND 1/2 TABLETS EVERY DAY (Patient taking differently: At Bedtime ), Disp: 135 tablet, Rfl: 3     terazosin (HYTRIN) 10 MG capsule, [TERAZOSIN (HYTRIN) 10 MG CAPSULE] TAKE 2 CAPSULES AT BEDTIME, Disp: 180 capsule, Rfl: 2     testosterone (ANDROGEL) 1.62 % (40.5 mg/2.5 gram) GlPk, [TESTOSTERONE (ANDROGEL) 1.62 % (40.5 MG/2.5 GRAM) GLPK] Apply 1 pck daily, Disp: 75 g, Rfl: 5     tiotropium  (SPIRIVA WITH HANDIHALER) 18 mcg inhalation capsule, [TIOTROPIUM (SPIRIVA WITH HANDIHALER) 18 MCG INHALATION CAPSULE] INHALE CONTENTS OF 1 CAPSULE DAILY, Disp: 30 capsule, Rfl: 5     traZODone (DESYREL) 50 MG tablet, [TRAZODONE (DESYREL) 50 MG TABLET] Take 1 tablet (50 mg total) by mouth at bedtime as needed for sleep. (Patient not taking: Reported on 8/16/2021), Disp: 90 tablet, Rfl: 1     TRULICITY 0.75 mg/0.5 mL PnIj, [TRULICITY 0.75 MG/0.5 ML PNIJ] INJECT  0.75MG  UNDER  THE  SKIN EVERY 7 DAYS (Patient taking differently: Inject 0.75 mg Subcutaneous every 7 days mondays), Disp: 12 Syringe, Rfl: 3  No current facility-administered medications for this visit.      Physical examination:    General: patient appears well in no acute distress, alert and oriented, speech clear and fluid  Skin: no visualized rash or lesions on visualized skin  Resp: Appears to be breathing comfortably without accessory muscle usage, speaking in full sentences, no audible wheezes or cough.  Psych: Coherent speech, normal rate and volume, able to articulate logical thoughts, able to abstract reason, no tangential thoughts, no hallucinations or delusions.        Lab data:  I have personally reviewed the following lab data: CBC CMP which are notable for  Cr 1, albumin 3    Radiology data:  I have personally reviewed the images of / or the following radiology data: CT A/P which are notable for  5 cm pancreatic head mass with biliary dilation    Pathology and other data:  I have personally reviewed and interpreted the following data: pancreatic head mass notable for adenocarcinoma        Assessment and Plan:  Mr. Raf Dalton is a 71 year old male with multi-morbidity (AAA repair, HTN, DM, COPD, nicotine use) who presented with jaundice (bili 25) and has since been found to have 5 cm head of pancreas ductal adenocarcinoma with biliary obstruction s/p stenting, CT chest with multiple new pulm nodules (in addition to chronic nodules).  Bili  has since come down to 4.  He has ECOG 1 going on 2, and several co-morbidities.  Already 1 ER visit for dizziness.    Problem list:  Likely advanced/metastatic pancreatic ductal adenocarcinoma with locally advanced primary  Cancer associated biliary obstruction  Cancer associated pain  Polypharmacy      Plan:  Port is set up for 10/18  Meet me to chat on 10/18 about options in person  NGS on tumor pending  Given bili may not normalize, we will plan for FOLFOX  Seeing pall care on 10/19--   Consider genetics and nutrition consult  I discussed clearly with them today that given likely advanced cancer, biliary obstruction, multiple co-morbidities, that he remains at high-risk of clinical decompensation. We discussed that time may be limited, especially if he tolerates chemo poorly.  Initially considered him for or PANOVA 3 trial, which is only for locally advanced pancreatic cancer, but now not eligible      The patient and family asked numerous excellent questions which I answered to the best of my abilities. At the completion of our consultation, the patient and accompanying caregivers had an excellent understanding of the plan. They have our contact information for any further questions or concerns and of course, as always, we are available in the interim.       45 minutes spent on the date of the encounter doing chart review, review of test results, interpretation of tests, patient visit, documentation, discussion with other provider(s) and discussion with family         Andrea Loya M.D.   of Medicine  Division of Hematology, Oncology and Transplantation  [facility]    Raf is a 71 year old who is being evaluated via a billable video visit.      How would you like to obtain your AVS? Mail a copy  If the video visit is dropped, the invitation should be resent by: Text to cell phone: [phone]   Will anyone else be joining your video visit? Wife, at home with pt.      Pt having  dizziness.     Video Start Time: 1100  Video-Visit Details    Type of service:  Video Visit    Video End Time:11:24 AM    Originating Location (pt. Location): Home    Distant Location (provider location):  [facility]     Platform used for Video Visit: Kaylan      Again, thank you for allowing me to participate in the care of your patient.        Sincerely,        Andrea Loya MD

## 2021-10-11 NOTE — PROGRESS NOTES
Raf is a 71 year old who is being evaluated via a billable video visit.      How would you like to obtain your AVS? Mail a copy  If the video visit is dropped, the invitation should be resent by: Text to cell phone: [phone]   Will anyone else be joining your video visit? Wife, at home with pt.      Pt having dizziness.     Video Start Time: 1100  Video-Visit Details    Type of service:  Video Visit    Video End Time:11:24 AM    Originating Location (pt. Location): Home    Distant Location (provider location):  [facility]     Platform used for Video Visit: Neolane

## 2021-10-13 NOTE — PROGRESS NOTES
Spoke with Raf and his wife today. My intention was to give Raf instructions for his port placement on Monday 10/18/21, but Raf had concerns about the procedure. He does not want chemotherapy or invasive procedures if they won't prolong his life and give him good quality of life. I did describe the procedure of a port placement and the benefit of quality vascular access. He was appreciative and said that he would follow-up with his oncology MD before making any decisions. Per his request I cancelled his port placement scheduled in IR.

## 2021-10-13 NOTE — TELEPHONE ENCOUNTER
Elissa fernández to see if  would prescribe something for her 's fecal impaction. Raf has tried and failed OTC remedies. I advised wife that she needs to reach out to Raf's current physicians including Oncology.     Please advise.

## 2021-10-15 NOTE — PROGRESS NOTES
Raf is a 71 year old who is being evaluated via a billable video visit.      How would you like to obtain your AVS? MyChart  If the video visit is dropped, the invitation should be resent by: Send to e-mail at: [email or web address]  Will anyone else be joining your video visit? Yes, wife         Palliative Care Clinic Consult Note    Patient Name: Raf Dalton  Primary Provider: Barrett Arambula    Chief Complaint/Patient ID: [id number] with diagnosis of locally advanced pancreatic adenocarcinoma.     Summer into fall 2021, had some belly pains and developed jaundice in September 2021.  Imaging revealed biliary dilation (s/p stenting) with head of pancreas mass.  Biopsy positive for ductal adenocarcinoma of the pancreas.  Oct CT scan revealed metastatic disease in the lungs.  Planning for FOLFOX, however he is high risk for clinical decompensation.    Additional medical history includes AAA repair 2019, HTN, DM2, obesity, and sleep apnea.     Reviewed: Yes, no concerns.  Recent prescriptions for oxycodone and gabapentin from PCP.    History of Present Illness:  Raf Dalton is a 71 year old male who is seen for a new consult via Video visit today with Palliative Care. His wife Elissa is also present and provides additional history for the visit.     Reviewed oncology notes 10/4, 10/11, and 10/18. Discussion re: treatments. Patient opted to forgo cancer-directed therapies, would rather focus on QOL.     He does have some pain but finds this currently well controlled with oxycodone. Struggling with constipation related to the opioids. Has had good relief now with miralax and Senna. No nausea, eating well. The fact that he has been doing well is also part of the decision to not do cancer-directed therapy, as they know it will make him feel sick.     [facility] discussion: Confirmed today he does not want to spend time away from home in intensive therapies that will add toxicity and will likely have only marginal  "benefit. His wife Elissa is \"a double amputee\", and he has been her primary caregiver. He wasn't to avoid going into the hospital or into a facility, as she would not be able to visit him due to issues with mobility. He wants to be at home, focus on comfort, and spend meaningful time with Elissa and his family. He knows that while chemo could extend his life somewhat, he says \"what good is that if I'm feeling crummy?\".     They both expressed concern about how she will manage and what resources will be available for her after his death. Currently she has some PCA services as well as help from a daughter. They have not connected with a .    Coping:  [facility] seems to be a very strong source of support and coping for patient and his wife.     Social History:  , wife has b/l amputation of legs, has 2 daughters in [place] and a son in [place]. 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 Active smoker. Has 2 cats and a dog.  Social History     Tobacco Use     Smoking status: Current Some Day Smoker     Packs/day: 0.50     Years: 55.00     Pack years: 27.50     Types: Cigarettes     Last attempt to quit: 10/18/2017     Years since quitting: 3.9     Smokeless tobacco: Never Used     Tobacco comment: .5PPD   Substance Use Topics     Alcohol use: Yes     Alcohol/week: 21.0 standard drinks     Comment: Alcoholic Drinks/day: 21 beers per week     Drug use: No       Family History- Reviewed in Epic. Notable for Lung cancer in mother.    No Known Allergies    Advanced Care Planning: Discussed code status today. After discussion, he opted for DNR/DNI. POLST completed virtually and will be mailed to them.    Medications- Reviewed in Epic.    Past Medical History- Reviewed in [address].    Past Surgical History- Reviewed in Epic.    Review of Systems:   ROS: 10 point ROS neg other than the symptoms noted above in the HPI.    Physical Exam:   Constitutional: Alert, pleasant, no apparent " distress. Sitting up in chair.  Eyes: Sclera non-icteric, no eye discharge.  ENT: No nasal discharge. Ears grossly normal.  Respiratory: Unlabored respirations. Speaking in full sentences.  Musculoskeletal: Extremities appear normal- no gross deformities noted. No edema noted on upper body.   Skin: No suspicious lesions or rashes on visible skin.  Neurologic: Clear speech, no aphasia. No facial droop.  Psychiatric: Mentation appears normal, appropriate attention. Affect normal/bright. Does not appear anxious or depressed.      Key Data Reviewed:  LABS: 10/6/2020- Cr 1.23, GFR 58, Albumin 2.8, LFTs with mildly elevated ALT. WBC 6.7, Hgb 10.3, Plts 223.     IMAGING: CT chest 10/8/2021- IMPRESSION:   1.  Multiple bilateral pulmonary nodules are new and increased when compared to 06/29/2021, suspicious for pulmonary metastases.  2.  There are also 2 nodules in the right upper lobe not appreciably changed from previous.        Impression & Recommendations & Counseling:  Raf Dalton is a 71 year old male with multiple comorbidities with diagnosis of pancreatic cancer. He has decided to forgo cancer-directed therapy and focus on comfort.    Constipation - Related to oxycodone, now managing well with bowel regimen.  - Continue miralax and senna.    [facility]   Pancreatic ca - Most of our visit was focused on goals of care and answering both of their questions about comfort focused care and hospice.  Given his desire to focus on quality of life and not do cancer directed therapies, I did make the recommendation that they enroll with hospice.  They had a good experience with hospice with Raf's mother and are very much open to this.  Referral was placed today.  Another very clear concern for both of them was additional help at home, specifically as burning symptoms worsen.  Elissa is not able to provide a lot of care for him.  I have sent a message asking about connecting them with a  through the oncology  department, however I encouraged them to speak with the  on the hospice team once they enroll.    In addition, discussed Code status with them today.  Given Raf's terminal condition as well as the fact that he does not want to be in the hospital or facility away from his wife, I made the recommendation against attempting CPR if his heart were to stop.  I said given his comorbidities, the chances of him coming home state of after a cardiac arrest would be very small.  He agreed with this.  He also confirmed that he would like to avoid the hospital in general if possible.  He completed a POLST form virtually today and will mail to them.  Discussed placing it on the refrigerator.    Prior to hospice enrollment, provided palliative care numbers for symptomatic questions.  At this time, he did not have many needs to address today, but they know they can call us with questions or concerns if needed.    -Hospice referral  -POLST form completed- DNAR/Comfort focused care.      Follow up: About a month, unless they do enroll with hospice in which case this appointment can be canceled.    Video-Visit Details  Video Start Time:  10:04 AM  Video End Time:  11:14 AM    Originating Location (pt. Location): Home     Distant Location (provider location): [facility]   Platform used for Video Visit: [employer]     Total time spent on day of encounter is 100 mins, including reviewing record, review of above studies, above visit with patient, and documentation. 75 mins spent specifically on goals of care discussion related to treatment decisions, hospice care, and code status discussion.    Jenny Oseguera, DO  Palliative Medicine   Pager [phone], AMCOM ID [id number]    Some chart documentation performed using Dragon Voice recognition Software. Although reviewed after completion, some words and grammatical errors may remain.

## 2021-10-18 NOTE — PROGRESS NOTES
[facility]  Medical Oncology Follow up Visit Note    Patient name: Raf Dalton  YOB: 1950    Oncologic History:  Diagnosis/ stage of cancer: Advanced pancreatic ductal adenocarcinoma (lung mets, KRAS G12D, TP53 R175H, JAY< TMB 3, PDLI 0%, no actionabel targets)    Prior treatment(s): -    Current treatment(s):      Treatment intent: possibly palliative--> considering hospice    Care Team  Medical oncologist: Andrea Loya  Surgical oncologist:  [place], likely Dr. Uriel Dennis (appt 11/15/21)  GI: Dr. Gabriel Adrian care: Dr. Jenny Oseguera   Other members of care team: PCP, Dr. Barrett Arambula  Primary caregiver at home/ contact:  Wife-- primary caretaker for wife  Daughter, Kasia,     Reason for consultation/ patient visit:  Jaundice and newly diagnosed pancreatic cancer    History of presenting illness:  Mr. Raf Dalotn is a 71 year old man.    HTN, DM2, obesity and sleep apnea, BPH, heavy nicotine use and COPD, AAA repair in 2019, neuropathy.     Over 2-3 months, some belly pains.  Then in 9/2021, noticed jaundice.  Went to ED on 9/25/2021. Bili 25 with CT A/P with biliary dilation with HOP mass.    EUS/ ERCP on 9/25  EUS:  HOP mass measured 53 mm by 45 mm in maximal cross-sectional diameter. The outer margins were irregular. There was sonographic evidence suggesting invasion into the portal vein (manifested by abutment), the superior mesenteric vein (manifested by encasement) and the splenic vein (manifested by abutment).   ERCP:  A single segmental biliary stricture was found in the lower third of the main bile duct. The stricture was malignant appearing. The upper third of the main bile duct and middle third of the main bile duct were dilated, with a mass causing an obstruction.- A biliary sphincterotomy was performed and one covered metal stent was placed into the common bile duct.     FNA     POSITIVE FOR MALIGNANT CELLS    CELL MORPHOLOGY IS CONSISTENT WITH DUCTAL  ADENOCARCINOMA OF PANCREAS     Colonoscopy 9/27/18   revealed internal hemorrhoids, diverticulosis, and tubular adenoma X2 removed. Recommended to have a repeat colonoscopy in 3 years (scheduled for 11/2021).      We met 10/4/2021 to establish care, goal to complete staging and meet again  At that time, reported   Not been eating too much-- 30 lb weight loss  No pain per se but some discomfort in belly  Feeling dizzy and weak overall    Geriatric Assessment  Functional status (ECOG performance status): 2  ADL (transferring, eating):  yes  IADL (finances, cooking, driving): yes  Objective function (timed get up and go test, <12 seconds): 10 seconds  Falls in past 6 months:  none  Cognitive function:  ok  Incontinence: none    ER presentation 10/5/2021 for dizziness, MRI brain negative    CT chest 10/8/2021:  1.  Multiple bilateral pulmonary nodules are new and increased when compared to 06/29/2021, suspicious for pulmonary metastases.  2.  There are also 2 nodules in the right upper lobe not appreciably changed from previous.    Plan was for IR port and FOLFOX but scheduling issues     Interval history:  In person visit today  With daughter  ECOG 2  Constipation bothering him  Wants to focus on being home and comfortable  Dos not want therapies with marginal benefit  Ongoing weight loss and constipation      Review of Systems: 14 point ROS negative other than the symptoms noted above in the HPI.    Past medical history:  HTN, DM2, obesity and sleep apnea on CPAP, BPH, heavy nicotine use and COPD,  neuropathy. ,     Past surgical history:  AAA repair in 2019    Social history:   80 pack year smoking history, still smoking  History of alcohol use  Lives with wife at home - Bhavana-- double amputee.  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  Kasia, daughter, 59 yo, lives with Mousie, [place]  Luiza, daughter 2, lives in [place] also  Younger son, Gabriel,  lives in [place]      Family history:  Mother-lung cancer, HTN  Father-  MI    Allergies:   No Known Allergies    Outpatient medications:     Current Outpatient Medications:      ACCU-CHEK TORI Misc, [ACCU-CHEK TORI MISC] , Disp: , Rfl:      ACCU-CHEK SMARTVIEW TEST STRIP strips, [ACCU-CHEK SMARTVIEW TEST STRIP STRIPS] TEST BLOOD SUGAR TWICE DAILY, Disp: 200 strip, Rfl: 3     acetaminophen (TYLENOL) 500 MG tablet, Take 500-1,000 mg by mouth nightly as needed , Disp: , Rfl:      albuterol (PROAIR HFA;PROVENTIL HFA;VENTOLIN HFA) 90 mcg/actuation inhaler, [ALBUTEROL (PROAIR HFA;PROVENTIL HFA;VENTOLIN HFA) 90 MCG/ACTUATION INHALER] Inhale 2 puffs every 6 (six) hours as needed for wheezing., Disp: 1 each, Rfl: 6     albuterol (PROVENTIL) 2.5 mg /3 mL (0.083 %) nebulizer solution, [ALBUTEROL (PROVENTIL) 2.5 MG /3 ML (0.083 %) NEBULIZER SOLUTION] Take 3 mL (2.5 mg total) by nebulization every 6 (six) hours as needed for wheezing or shortness of breath., Disp: 25 vial, Rfl: 2     aspirin 81 MG EC tablet, [ASPIRIN 81 MG EC TABLET] Take 1 tablet (81 mg total) by mouth daily., Disp: , Rfl: 0     budesonide-formoterol (SYMBICORT) 80-4.5 mcg/actuation inhaler, [BUDESONIDE-FORMOTEROL (SYMBICORT) 80-4.5 MCG/ACTUATION INHALER] Inhale 2 puffs 2 (two) times a day., Disp: 1 Inhaler, Rfl: 12     buPROPion (WELLBUTRIN SR) 150 MG 12 hr tablet, [BUPROPION (WELLBUTRIN SR) 150 MG 12 HR TABLET] TAKE 1 TABLET TWICE DAILY, Disp: 180 tablet, Rfl: 2     cholecalciferol, vitamin D3, (VITAMIN D3) 5,000 unit Tab, [CHOLECALCIFEROL, VITAMIN D3, (VITAMIN D3) 5,000 UNIT TAB] Take 1 tablet (5,000 Units total) by mouth daily. (Patient taking differently: Take 1,000 Units by mouth daily ), Disp: 90 tablet, Rfl: 3     cyanocobalamin 1000 MCG tablet, [CYANOCOBALAMIN 1000 MCG TABLET] Take 1 tablet (1,000 mcg total) by mouth daily., Disp: 100 tablet, Rfl: 3     fluticasone propionate (FLONASE) 50 mcg/actuation nasal spray, [FLUTICASONE PROPIONATE (FLONASE) 50  MCG/ACTUATION NASAL SPRAY] 1 spray into each nostril 2 (two) times a day as needed., Disp: 16 g, Rfl: 11     gabapentin (NEURONTIN) 300 MG capsule, [GABAPENTIN (NEURONTIN) 300 MG CAPSULE] Take 3 capsules in the morning, 2 in the afternoon and 3 capsules in the evening (Patient taking differently: Take 900 mg by mouth 2 times daily ), Disp: 720 capsule, Rfl: 3     generic lancets (ACCU-CHEK FASTCLIX), [GENERIC LANCETS (ACCU-CHEK FASTCLIX)] USE TO TEST TWICE DAILY, Disp: 200 each, Rfl: 3     inhalational spacing device Spcr, [INHALATIONAL SPACING DEVICE SPCR] Use two times a day with symbicort, Disp: 1 each, Rfl: 0     ketoconazole (NIZORAL) 2 % cream, [KETOCONAZOLE (NIZORAL) 2 % CREAM] ketoconazole 2 % topical cream   APPLY TO FUNGUS NAILS DAILY FOR 90 DAYS, Disp: , Rfl:      lisinopriL (PRINIVIL,ZESTRIL) 20 MG tablet, [LISINOPRIL (PRINIVIL,ZESTRIL) 20 MG TABLET] TAKE 1 TABLET TWICE DAILY FOR BLOOD PRESSURE, Disp: 180 tablet, Rfl: 1     metFORMIN (GLUCOPHAGE-XR) 500 MG 24 hr tablet, [METFORMIN (GLUCOPHAGE-XR) 500 MG 24 HR TABLET] TAKE 2 TABLETS TWICE DAILY, Disp: 360 tablet, Rfl: 3     NARCAN 4 MG/0.1ML nasal spray, , Disp: , Rfl:      oxybutynin (DITROPAN) 5 MG tablet, , Disp: , Rfl:      oxybutynin (DITROPAN) 5 MG tablet, Take 2.5 mg by mouth 2 times daily , Disp: , Rfl:      oxyCODONE (ROXICODONE) 5 MG tablet, Take 5 mg by mouth every 6 hours as needed for severe pain, Disp: , Rfl:      sildenafil (REVATIO) 20 mg tablet, [SILDENAFIL (REVATIO) 20 MG TABLET] Take by mouth as needed.       , Disp: , Rfl: 3     simvastatin (ZOCOR) 40 MG tablet, [SIMVASTATIN (ZOCOR) 40 MG TABLET] TAKE 1 AND 1/2 TABLETS EVERY DAY (Patient taking differently: At Bedtime ), Disp: 135 tablet, Rfl: 3     terazosin (HYTRIN) 10 MG capsule, [TERAZOSIN (HYTRIN) 10 MG CAPSULE] TAKE 2 CAPSULES AT BEDTIME, Disp: 180 capsule, Rfl: 2     testosterone (ANDROGEL) 1.62 % (40.5 mg/2.5 gram) GlPk, [TESTOSTERONE (ANDROGEL) 1.62 % (40.5 MG/2.5 GRAM) GLPK]  Apply 1 pck daily, Disp: 75 g, Rfl: 5     tiotropium (SPIRIVA WITH HANDIHALER) 18 mcg inhalation capsule, [TIOTROPIUM (SPIRIVA WITH HANDIHALER) 18 MCG INHALATION CAPSULE] INHALE CONTENTS OF 1 CAPSULE DAILY, Disp: 30 capsule, Rfl: 5     traZODone (DESYREL) 50 MG tablet, [TRAZODONE (DESYREL) 50 MG TABLET] Take 1 tablet (50 mg total) by mouth at bedtime as needed for sleep. (Patient not taking: Reported on 8/16/2021), Disp: 90 tablet, Rfl: 1     TRULICITY 0.75 mg/0.5 mL PnIj, [TRULICITY 0.75 MG/0.5 ML PNIJ] INJECT  0.75MG  UNDER  THE  SKIN EVERY 7 DAYS (Patient taking differently: Inject 0.75 mg Subcutaneous every 7 days mondays), Disp: 12 Syringe, Rfl: 3      Physical examination:  /72   Pulse 86   Temp 98.4  F (36.9  C) (Oral)   Resp 18   Wt 81.2 kg (179 lb)   SpO2 96%   BMI 26.82 kg/m      ECOG performance status:  2  Vascular access:  None yet     GENERAL: Well-nourished healthy-appearing man in chair, no acute distress.   HEENT: No icterus, no pallor. Moist mucous membranes.   NECK: Supple, no JVD/LAD.  LUNGS: Clear to ausculation bilaterally, normal work of breathing.   CARDIOVASCULAR: Regular rate and rhythm, no murmurs, gallops or rubs.   ABDOMEN: Soft, nontender and nondistended.  EXTREMITIES: No cyanosis, no clubbing, no edema.   NEUROLOGIC: No focal deficits, alert/ oriented  LYMPH NODE EXAM: No palpable adenopathy.    Lab data:  I have personally reviewed the following lab data: CBC CMP which are notable for  Cr 1, albumin 3    Radiology data:  I have personally reviewed the images of / or the following radiology data: CT A/P which are notable for  5 cm pancreatic head mass with biliary dilation. CT chest with lung mets    Pathology and other data:  I have personally reviewed and interpreted the following data: pancreatic head mass notable for adenocarcinoma        Assessment and Plan:  Mr. Raf Dalton is a 71 year old male with multi-morbidity (AAA repair, HTN, DM, COPD, nicotine use) who  presented with jaundice (bili 25) and has since been found to have 5 cm head of pancreas ductal adenocarcinoma with biliary obstruction s/p stenting, CT chest with multiple new pulm nodules (in addition to chronic nodules).  Bili has since come down to 4.  No actionable targets.  He has ECOG 2, and several co-morbidities.  Already 1 ER visit for dizziness.  Unsteady gait.  We discussed today treatment options. Including chemo.  He knows he is very sick.  He is primary caregiver to his double amputee wife.  He does not want to spend time away from home in intensive therapies that will add toxicity and will likely have only marginal benefit.      Problem list:  Advanced/metastatic pancreatic ductal adenocarcinoma with locally advanced primary  Cancer associated biliary obstruction  Cancer associated pain  Polypharmacy      Plan:  No active anti-cancer therapy to be pursued  Seeing pall care tomorrow, will let them know to assist with hospice set up  No scheduled onc follow up but happy to assist in any way      The patient and family asked numerous excellent questions which I answered to the best of my abilities. At the completion of our consultation, the patient and accompanying caregivers had an excellent understanding of the plan. They have our contact information for any further questions or concerns and of course, as always, we are available in the interim.       60 minutes spent on the date of the encounter doing chart review, review of test results, interpretation of tests, patient visit, documentation, discussion with other provider(s) and discussion with family         Andrea Loya M.D.   of Medicine  Division of Hematology, Oncology and Transplantation  [facility]

## 2021-10-18 NOTE — LETTER
10/18/2021         RE: Raf Dalton  [address]  [place] [address]        Dear Colleague,    Thank you for referring your patient, Raf Dalton, to the [facility]. Please see a copy of my visit note below.    [facility]  Medical Oncology Follow up Visit Note    Patient name: Raf Dalton  YOB: 1950    Oncologic History:  Diagnosis/ stage of cancer: Advanced pancreatic ductal adenocarcinoma (lung mets, KRAS G12D, TP53 R175H, JAY< TMB 3, PDLI 0%, no actionabel targets)    Prior treatment(s): -    Current treatment(s):      Treatment intent: possibly palliative--> considering hospice    Care Team  Medical oncologist: Andrea Loya  Surgical oncologist:  [place], likely Dr. Uriel Dennis (appt 11/15/21)  GI: Dr. Gabriel Adrian care: Dr. Jenny Oseguera   Other members of care team: PCP, Dr. Barrett Arambula  Primary caregiver at home/ contact:  Wife-- primary caretaker for wife  Daughter, Kasia,     Reason for consultation/ patient visit:  Jaundice and newly diagnosed pancreatic cancer    History of presenting illness:  Mr. Raf Dalton is a 71 year old man.    HTN, DM2, obesity and sleep apnea, BPH, heavy nicotine use and COPD, AAA repair in 2019, neuropathy.     Over 2-3 months, some belly pains.  Then in 9/2021, noticed jaundice.  Went to ED on 9/25/2021. Bili 25 with CT A/P with biliary dilation with HOP mass.    EUS/ ERCP on 9/25  EUS:  HOP mass measured 53 mm by 45 mm in maximal cross-sectional diameter. The outer margins were irregular. There was sonographic evidence suggesting invasion into the portal vein (manifested by abutment), the superior mesenteric vein (manifested by encasement) and the splenic vein (manifested by abutment).   ERCP:  A single segmental biliary stricture was found in the lower third of the main bile duct. The stricture was malignant appearing. The upper third of the main bile duct and middle third of the main  bile duct were dilated, with a mass causing an obstruction.- A biliary sphincterotomy was performed and one covered metal stent was placed into the common bile duct.     FNA     POSITIVE FOR MALIGNANT CELLS    CELL MORPHOLOGY IS CONSISTENT WITH DUCTAL ADENOCARCINOMA OF PANCREAS     Colonoscopy 9/27/18   revealed internal hemorrhoids, diverticulosis, and tubular adenoma X2 removed. Recommended to have a repeat colonoscopy in 3 years (scheduled for 11/2021).      We met 10/4/2021 to establish care, goal to complete staging and meet again  At that time, reported   Not been eating too much-- 30 lb weight loss  No pain per se but some discomfort in belly  Feeling dizzy and weak overall    Geriatric Assessment  Functional status (ECOG performance status): 2  ADL (transferring, eating):  yes  IADL (finances, cooking, driving): yes  Objective function (timed get up and go test, <12 seconds): 10 seconds  Falls in past 6 months:  none  Cognitive function:  ok  Incontinence: none    ER presentation 10/5/2021 for dizziness, MRI brain negative    CT chest 10/8/2021:  1.  Multiple bilateral pulmonary nodules are new and increased when compared to 06/29/2021, suspicious for pulmonary metastases.  2.  There are also 2 nodules in the right upper lobe not appreciably changed from previous.    Plan was for IR port and FOLFOX but scheduling issues     Interval history:  In person visit today  With daughter  ECOG 2  Constipation bothering him  Wants to focus on being home and comfortable  Dos not want therapies with marginal benefit  Ongoing weight loss and constipation      Review of Systems: 14 point ROS negative other than the symptoms noted above in the HPI.    Past medical history:  HTN, DM2, obesity and sleep apnea on CPAP, BPH, heavy nicotine use and COPD,  neuropathy. ,     Past surgical history:  AAA repair in 2019    Social history:   80 pack year smoking history, still smoking  History of alcohol use  Lives with wife at home  - Bhavana-- double amputee.  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  Kasia, daughter, 57 yo, lives with [place], [place]  Luiza, daughter 2, lives in [place] also  Younger son, Gabriel, lives in [place]      Family history:  Mother-lung cancer, HTN  Father-  MI    Allergies:   No Known Allergies    Outpatient medications:     Current Outpatient Medications:      ACCU-CHEK TORI Misc, [ACCU-CHEK TORI MISC] , Disp: , Rfl:      ACCU-CHEK SMARTVIEW TEST STRIP strips, [ACCU-CHEK SMARTVIEW TEST STRIP STRIPS] TEST BLOOD SUGAR TWICE DAILY, Disp: 200 strip, Rfl: 3     acetaminophen (TYLENOL) 500 MG tablet, Take 500-1,000 mg by mouth nightly as needed , Disp: , Rfl:      albuterol (PROAIR HFA;PROVENTIL HFA;VENTOLIN HFA) 90 mcg/actuation inhaler, [ALBUTEROL (PROAIR HFA;PROVENTIL HFA;VENTOLIN HFA) 90 MCG/ACTUATION INHALER] Inhale 2 puffs every 6 (six) hours as needed for wheezing., Disp: 1 each, Rfl: 6     albuterol (PROVENTIL) 2.5 mg /3 mL (0.083 %) nebulizer solution, [ALBUTEROL (PROVENTIL) 2.5 MG /3 ML (0.083 %) NEBULIZER SOLUTION] Take 3 mL (2.5 mg total) by nebulization every 6 (six) hours as needed for wheezing or shortness of breath., Disp: 25 vial, Rfl: 2     aspirin 81 MG EC tablet, [ASPIRIN 81 MG EC TABLET] Take 1 tablet (81 mg total) by mouth daily., Disp: , Rfl: 0     budesonide-formoterol (SYMBICORT) 80-4.5 mcg/actuation inhaler, [BUDESONIDE-FORMOTEROL (SYMBICORT) 80-4.5 MCG/ACTUATION INHALER] Inhale 2 puffs 2 (two) times a day., Disp: 1 Inhaler, Rfl: 12     buPROPion (WELLBUTRIN SR) 150 MG 12 hr tablet, [BUPROPION (WELLBUTRIN SR) 150 MG 12 HR TABLET] TAKE 1 TABLET TWICE DAILY, Disp: 180 tablet, Rfl: 2     cholecalciferol, vitamin D3, (VITAMIN D3) 5,000 unit Tab, [CHOLECALCIFEROL, VITAMIN D3, (VITAMIN D3) 5,000 UNIT TAB] Take 1 tablet (5,000 Units total) by mouth daily. (Patient taking differently: Take 1,000 Units by mouth daily ), Disp: 90 tablet, Rfl:  3     cyanocobalamin 1000 MCG tablet, [CYANOCOBALAMIN 1000 MCG TABLET] Take 1 tablet (1,000 mcg total) by mouth daily., Disp: 100 tablet, Rfl: 3     fluticasone propionate (FLONASE) 50 mcg/actuation nasal spray, [FLUTICASONE PROPIONATE (FLONASE) 50 MCG/ACTUATION NASAL SPRAY] 1 spray into each nostril 2 (two) times a day as needed., Disp: 16 g, Rfl: 11     gabapentin (NEURONTIN) 300 MG capsule, [GABAPENTIN (NEURONTIN) 300 MG CAPSULE] Take 3 capsules in the morning, 2 in the afternoon and 3 capsules in the evening (Patient taking differently: Take 900 mg by mouth 2 times daily ), Disp: 720 capsule, Rfl: 3     generic lancets (ACCU-CHEK FASTCLIX), [GENERIC LANCETS (ACCU-CHEK FASTCLIX)] USE TO TEST TWICE DAILY, Disp: 200 each, Rfl: 3     inhalational spacing device Spcr, [INHALATIONAL SPACING DEVICE SPCR] Use two times a day with symbicort, Disp: 1 each, Rfl: 0     ketoconazole (NIZORAL) 2 % cream, [KETOCONAZOLE (NIZORAL) 2 % CREAM] ketoconazole 2 % topical cream   APPLY TO FUNGUS NAILS DAILY FOR 90 DAYS, Disp: , Rfl:      lisinopriL (PRINIVIL,ZESTRIL) 20 MG tablet, [LISINOPRIL (PRINIVIL,ZESTRIL) 20 MG TABLET] TAKE 1 TABLET TWICE DAILY FOR BLOOD PRESSURE, Disp: 180 tablet, Rfl: 1     metFORMIN (GLUCOPHAGE-XR) 500 MG 24 hr tablet, [METFORMIN (GLUCOPHAGE-XR) 500 MG 24 HR TABLET] TAKE 2 TABLETS TWICE DAILY, Disp: 360 tablet, Rfl: 3     NARCAN 4 MG/0.1ML nasal spray, , Disp: , Rfl:      oxybutynin (DITROPAN) 5 MG tablet, , Disp: , Rfl:      oxybutynin (DITROPAN) 5 MG tablet, Take 2.5 mg by mouth 2 times daily , Disp: , Rfl:      oxyCODONE (ROXICODONE) 5 MG tablet, Take 5 mg by mouth every 6 hours as needed for severe pain, Disp: , Rfl:      sildenafil (REVATIO) 20 mg tablet, [SILDENAFIL (REVATIO) 20 MG TABLET] Take by mouth as needed.       , Disp: , Rfl: 3     simvastatin (ZOCOR) 40 MG tablet, [SIMVASTATIN (ZOCOR) 40 MG TABLET] TAKE 1 AND 1/2 TABLETS EVERY DAY (Patient taking differently: At Bedtime ), Disp: 135 tablet,  Rfl: 3     terazosin (HYTRIN) 10 MG capsule, [TERAZOSIN (HYTRIN) 10 MG CAPSULE] TAKE 2 CAPSULES AT BEDTIME, Disp: 180 capsule, Rfl: 2     testosterone (ANDROGEL) 1.62 % (40.5 mg/2.5 gram) GlPk, [TESTOSTERONE (ANDROGEL) 1.62 % (40.5 MG/2.5 GRAM) GLPK] Apply 1 pck daily, Disp: 75 g, Rfl: 5     tiotropium (SPIRIVA WITH HANDIHALER) 18 mcg inhalation capsule, [TIOTROPIUM (SPIRIVA WITH HANDIHALER) 18 MCG INHALATION CAPSULE] INHALE CONTENTS OF 1 CAPSULE DAILY, Disp: 30 capsule, Rfl: 5     traZODone (DESYREL) 50 MG tablet, [TRAZODONE (DESYREL) 50 MG TABLET] Take 1 tablet (50 mg total) by mouth at bedtime as needed for sleep. (Patient not taking: Reported on 8/16/2021), Disp: 90 tablet, Rfl: 1     TRULICITY 0.75 mg/0.5 mL PnIj, [TRULICITY 0.75 MG/0.5 ML PNIJ] INJECT  0.75MG  UNDER  THE  SKIN EVERY 7 DAYS (Patient taking differently: Inject 0.75 mg Subcutaneous every 7 days mondays), Disp: 12 Syringe, Rfl: 3      Physical examination:  /72   Pulse 86   Temp 98.4  F (36.9  C) (Oral)   Resp 18   Wt 81.2 kg (179 lb)   SpO2 96%   BMI 26.82 kg/m      ECOG performance status:  2  Vascular access:  None yet     GENERAL: Well-nourished healthy-appearing man in chair, no acute distress.   HEENT: No icterus, no pallor. Moist mucous membranes.   NECK: Supple, no JVD/LAD.  LUNGS: Clear to ausculation bilaterally, normal work of breathing.   CARDIOVASCULAR: Regular rate and rhythm, no murmurs, gallops or rubs.   ABDOMEN: Soft, nontender and nondistended.  EXTREMITIES: No cyanosis, no clubbing, no edema.   NEUROLOGIC: No focal deficits, alert/ oriented  LYMPH NODE EXAM: No palpable adenopathy.    Lab data:  I have personally reviewed the following lab data: CBC CMP which are notable for  Cr 1, albumin 3    Radiology data:  I have personally reviewed the images of / or the following radiology data: CT A/P which are notable for  5 cm pancreatic head mass with biliary dilation. CT chest with lung mets    Pathology and other  data:  I have personally reviewed and interpreted the following data: pancreatic head mass notable for adenocarcinoma        Assessment and Plan:  Mr. Raf Dalton is a 71 year old male with multi-morbidity (AAA repair, HTN, DM, COPD, nicotine use) who presented with jaundice (bili 25) and has since been found to have 5 cm head of pancreas ductal adenocarcinoma with biliary obstruction s/p stenting, CT chest with multiple new pulm nodules (in addition to chronic nodules).  Bili has since come down to 4.  No actionable targets.  He has ECOG 2, and several co-morbidities.  Already 1 ER visit for dizziness.  Unsteady gait.  We discussed today treatment options. Including chemo.  He knows he is very sick.  He is primary caregiver to his double amputee wife.  He does not want to spend time away from home in intensive therapies that will add toxicity and will likely have only marginal benefit.      Problem list:  Advanced/metastatic pancreatic ductal adenocarcinoma with locally advanced primary  Cancer associated biliary obstruction  Cancer associated pain  Polypharmacy      Plan:  No active anti-cancer therapy to be pursued  Seeing pall care tomorrow, will let them know to assist with hospice set up  No scheduled onc follow up but happy to assist in any way      The patient and family asked numerous excellent questions which I answered to the best of my abilities. At the completion of our consultation, the patient and accompanying caregivers had an excellent understanding of the plan. They have our contact information for any further questions or concerns and of course, as always, we are available in the interim.       60 minutes spent on the date of the encounter doing chart review, review of test results, interpretation of tests, patient visit, documentation, discussion with other provider(s) and discussion with family         Andrea Loya M.D.   of Medicine  Division of Hematology, Oncology and  Transplantation  [facility]      Again, thank you for allowing me to participate in the care of your patient.        Sincerely,        Andrea Loya MD

## 2021-10-18 NOTE — NURSING NOTE
"Oncology Rooming Note    October 18, 2021 9:45 AM   Raf Dalton is a 71 year old male who presents for:    Chief Complaint   Patient presents with     Oncology Clinic Visit     Return; Pancreatic Ca     Initial Vitals: /72   Pulse 86   Temp 98.4  F (36.9  C) (Oral)   Resp 18   Wt 81.2 kg (179 lb)   SpO2 96%   BMI 26.82 kg/m   Estimated body mass index is 26.82 kg/m  as calculated from the following:    Height as of 10/6/21: 1.74 m (5' 8.5\").    Weight as of this encounter: 81.2 kg (179 lb). Body surface area is 1.98 meters squared.  Severe Pain (6) Comment: Data Unavailable   No LMP for male patient.  Allergies reviewed: Yes  Medications reviewed: Yes    Medications: Medication refills not needed today.  Pharmacy name entered into [employer]: [id number] - [address] [facility] [address]    Clinical concerns: Pt would like to discuss treatment options. Dr Loya was notified.      Ese Friedman CMA              "

## 2021-10-19 NOTE — LETTER
10/19/2021       RE: Raf Dalton  [address]  [place] [address]     Dear Colleague,    Thank you for referring your patient, Raf Dalton, to the [facility] at [facility]. Please see a copy of my visit note below.    Palliative Care Clinic Consult Note    Patient Name: Raf Dalton  Primary Provider: Barrett Arambula    Chief Complaint/Patient ID: [id number] M with diagnosis of locally advanced pancreatic adenocarcinoma.     Summer into fall 2021, had some belly pains and developed jaundice in September 2021.  Imaging revealed biliary dilation (s/p stenting) with head of pancreas mass.  Biopsy positive for ductal adenocarcinoma of the pancreas.  Oct CT scan revealed metastatic disease in the lungs.  Planning for FOLFOX, however he is high risk for clinical decompensation.    Additional medical history includes AAA repair 2019, HTN, DM2, obesity, and sleep apnea.     Reviewed: Yes, no concerns.  Recent prescriptions for oxycodone and gabapentin from PCP.    History of Present Illness:  Raf Dalton is a 71 year old male who is seen for a new consult via Video visit today with Palliative Care. His wife Elissa is also present and provides additional history for the visit.     Reviewed oncology notes 10/4, 10/11, and 10/18. Discussion re: treatments. Patient opted to forgo cancer-directed therapies, would rather focus on QOL.     He does have some pain but finds this currently well controlled with oxycodone. Struggling with constipation related to the opioids. Has had good relief now with miralax and Senna. No nausea, eating well. The fact that he has been doing well is also part of the decision to not do cancer-directed therapy, as they know it will make him feel sick.     [facility] discussion: Confirmed today he does not want to spend time away from home in intensive therapies that will add toxicity and will likely have only  "marginal benefit. His wife Elissa is \"a double amputee\", and he has been her primary caregiver. He wasn't to avoid going into the hospital or into a facility, as she would not be able to visit him due to issues with mobility. He wants to be at home, focus on comfort, and spend meaningful time with Elissa and his family. He knows that while chemo could extend his life somewhat, he says \"what good is that if I'm feeling crummy?\".     They both expressed concern about how she will manage and what resources will be available for her after his death. Currently she has some PCA services as well as help from a daughter. They have not connected with a .    Coping:  [facility] seems to be a very strong source of support and coping for patient and his wife.     Social History:  , wife has b/l amputation of legs, has 2 daughters in [place] and a son in [place]. 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 Active smoker. Has 2 cats and a dog.  Social History     Tobacco Use     Smoking status: Current Some Day Smoker     Packs/day: 0.50     Years: 55.00     Pack years: 27.50     Types: Cigarettes     Last attempt to quit: 10/18/2017     Years since quitting: 3.9     Smokeless tobacco: Never Used     Tobacco comment: .5PPD   Substance Use Topics     Alcohol use: Yes     Alcohol/week: 21.0 standard drinks     Comment: Alcoholic Drinks/day: 21 beers per week     Drug use: No       Family History- Reviewed in Epic. Notable for Lung cancer in mother.    No Known Allergies    Advanced Care Planning: Discussed code status today. After discussion, he opted for DNR/DNI. POLST completed virtually and will be mailed to them.    Medications- Reviewed in Epic.    Past Medical History- Reviewed in [employer].    Past Surgical History- Reviewed in Epic.    Review of Systems:   ROS: 10 point ROS neg other than the symptoms noted above in the HPI.    Physical Exam:   Constitutional: Alert, pleasant, no " apparent distress. Sitting up in chair.  Eyes: Sclera non-icteric, no eye discharge.  ENT: No nasal discharge. Ears grossly normal.  Respiratory: Unlabored respirations. Speaking in full sentences.  Musculoskeletal: Extremities appear normal- no gross deformities noted. No edema noted on upper body.   Skin: No suspicious lesions or rashes on visible skin.  Neurologic: Clear speech, no aphasia. No facial droop.  Psychiatric: Mentation appears normal, appropriate attention. Affect normal/bright. Does not appear anxious or depressed.      Key Data Reviewed:  LABS: 10/6/2020- Cr 1.23, GFR 58, Albumin 2.8, LFTs with mildly elevated ALT. WBC 6.7, Hgb 10.3, Plts 223.     IMAGING: CT chest 10/8/2021- IMPRESSION:   1.  Multiple bilateral pulmonary nodules are new and increased when compared to 06/29/2021, suspicious for pulmonary metastases.  2.  There are also 2 nodules in the right upper lobe not appreciably changed from previous.        Impression & Recommendations & Counseling:  Raf Dalton is a 71 year old male with multiple comorbidities with diagnosis of pancreatic cancer. He has decided to forgo cancer-directed therapy and focus on comfort.    Constipation - Related to oxycodone, now managing well with bowel regimen.  - Continue miralax and senna.    [facility]   Pancreatic ca - Most of our visit was focused on goals of care and answering both of their questions about comfort focused care and hospice.  Given his desire to focus on quality of life and not do cancer directed therapies, I did make the recommendation that they enroll with hospice.  They had a good experience with hospice with Raf's mother and are very much open to this.  Referral was placed today.  Another very clear concern for both of them was additional help at home, specifically as burning symptoms worsen.  Elissa is not able to provide a lot of care for him.  I have sent a message asking about connecting them with a  through the oncology  department, however I encouraged them to speak with the  on the hospice team once they enroll.    In addition, discussed Code status with them today.  Given Raf's terminal condition as well as the fact that he does not want to be in the hospital or facility away from his wife, I made the recommendation against attempting CPR if his heart were to stop.  I said given his comorbidities, the chances of him coming home state of after a cardiac arrest would be very small.  He agreed with this.  He also confirmed that he would like to avoid the hospital in general if possible.  He completed a POLST form virtually today and will mail to them.  Discussed placing it on the refrigerator.    Prior to hospice enrollment, provided palliative care numbers for symptomatic questions.  At this time, he did not have many needs to address today, but they know they can call us with questions or concerns if needed.    -Hospice referral  -POLST form completed- DNAR/Comfort focused care.      Follow up: About a month, unless they do enroll with hospice in which case this appointment can be canceled.    Video-Visit Details  Video Start Time:  10:04 AM  Video End Time:  11:14 AM    Originating Location (pt. Location): Home     Distant Location (provider location): [facility]   Platform used for Video Visit: Intercom     Total time spent on day of encounter is 100 mins, including reviewing record, review of above studies, above visit with patient, and documentation. 75 mins spent specifically on goals of care discussion related to treatment decisions, hospice care, and code status discussion.    Jenny Oseguera,   Palliative Medicine   Pager [phone], AMCOM ID [id number]    Some chart documentation performed using Dragon Voice recognition Software. Although reviewed after completion, some words and grammatical errors may remain.        Again, thank you for allowing me to participate in the care of  your patient.      Sincerely,    Jenny Oseguera, DO

## 2021-10-19 NOTE — PROGRESS NOTES
Social Work Telephone Note  [facility]    Patient Name:  Raf Dalton  /Age:  1950 (71 year old)    Referral Source: Palliative Care  Reason for Referral:  Discuss hospice and care options for Patient's wife (Patient is caregiver for wife who is double amputee)     contacted Patient and his wife, Elissa, today. They were just laying down to take a rest and requested that  call back tomorrow, 10/20/2021, at 1030h.  will call back at that time to discuss options and provide support.    Beatris Vogel [facility]  Clinical , Outpatient Specialty Clinics  Direct Phone: [phone]

## 2021-10-20 NOTE — PROGRESS NOTES
Social Work Intervention  [facility] [place]    Data/Intervention:    Patient Name:  Raf Dalton  /Age:  1950 (71 year old)    Visit Type: telephone  Referral Source: Palliative Care, Patient also triggered the Oncology Distress Screen on 10/19/2021  Reason for Referral:  Discuss hospice and care options for Patient's wife (Patient is caregiver for wife who is double amputee)    Collaborated With:    -Patient  -Patient's spouse Elissa (on speaker phone)    Oncology Distress Screening    Row Name  10/19/21   0900           Please tell me how concerned you feel regarding the following common issues people with cancer face. Please answer with a number from zero to ten where 0=not concerned and 10=very concerned.    1. How concerned are you about your ability to eat?   0           2. How concerned are you about unintended weight loss or your current weight?   5           3. How concerned are you about feeling depressed or very sad?   2           4. How concerned are you about feeling anxious or very scared?   3           5. Do you struggle with the loss of meaning and cyndi in your life?   Not at all           6. How concerned are you about work and home life issues that may be affected by your cancer?   10Abnormal            7. How concerned are you about knowing what resources are available to help you?   10Abnormal            8. Do you currently have what you would describe as Faith or spiritual struggles?              Not at all           You can also ask to be contacted by one of our Oncology Supportive Care professionals.    9. If you want to be contacted by one of our professionals, I can send a message to them right now.   Oncology Social  Worker                 Psychosocial Information/Concerns:  Raf Dalton is a 70yo Male with a diagnosis of locally advanced pancreatic adenocarcinoma. Patient has decided to enroll in hospice, which will happen soon, but Patient and his spouse, Elissa  had many questions regarding in-home supports. Patient's spouse is a double amputee and Patient is her primary caregiver (helps her transfer, bathe, dress, etc). Patient is very concerned about who will take care of his wife once he dies.     Intervention/Education/Resources Provided:   provided supportive listening and validation throughout conversation. Patient has a meeting with hospice on Friday (10/22/2021).  was able to answer some of their questions regarding logistical end of life things such as death certificates, bill paying, and Social Security. Patient is, understandably so, concerned about his wife's care after he is no longer able to provide her cares. Patient's wife is on a waiver program and has a /financial worker through [employer]. She currently receives 7hrs/day of PCA services. Patient's wife has a meeting with her /financial worker tomorrow to discuss a plan.  encouraged her to ask to be re-evaluated for more PCA hours/services.       Assessment/Plan:  Patient and his wife, Elissa, seem to be coping with diagnosis and decision for hospice. They had many questions, which were all appropriate, that  was able to answer. Patient and Elissa appreciated 's call and support. They will look forward to their respective appointments this week to further plan.  will remain available as needed.    Provided patient/family with contact information and availability.    Beatris Vogel [facility]  Clinical , Outpatient Specialty Clinics  Direct Phone: [phone]

## 2022-01-01 ENCOUNTER — HOSPITAL ENCOUNTER (EMERGENCY)
Facility: CLINIC | Age: 72
Discharge: HOME | End: 2022-01-23
Admitting: PHYSICIAN ASSISTANT
Payer: OTHER MISCELLANEOUS

## 2022-01-01 VITALS — BODY MASS INDEX: 32.14 KG/M2 | WEIGHT: 217 LBS | HEIGHT: 69 IN

## 2022-01-01 VITALS
BODY MASS INDEX: 32.49 KG/M2 | WEIGHT: 220 LBS | HEART RATE: 77 BPM | SYSTOLIC BLOOD PRESSURE: 136 MMHG | OXYGEN SATURATION: 95 % | DIASTOLIC BLOOD PRESSURE: 78 MMHG

## 2022-01-01 VITALS
HEIGHT: 69 IN | WEIGHT: 217 LBS | OXYGEN SATURATION: 93 % | DIASTOLIC BLOOD PRESSURE: 76 MMHG | HEART RATE: 85 BPM | SYSTOLIC BLOOD PRESSURE: 128 MMHG | RESPIRATION RATE: 16 BRPM | BODY MASS INDEX: 32.14 KG/M2

## 2022-01-01 VITALS
SYSTOLIC BLOOD PRESSURE: 125 MMHG | BODY MASS INDEX: 23.6 KG/M2 | TEMPERATURE: 97 F | HEART RATE: 93 BPM | RESPIRATION RATE: 21 BRPM | WEIGHT: 157.5 LBS | DIASTOLIC BLOOD PRESSURE: 95 MMHG | OXYGEN SATURATION: 97 %

## 2022-01-01 VITALS
RESPIRATION RATE: 16 BRPM | SYSTOLIC BLOOD PRESSURE: 140 MMHG | HEART RATE: 60 BPM | BODY MASS INDEX: 32.44 KG/M2 | TEMPERATURE: 98.3 F | DIASTOLIC BLOOD PRESSURE: 78 MMHG | HEIGHT: 69 IN | WEIGHT: 219 LBS

## 2022-01-01 DIAGNOSIS — Z51.5 HOSPICE CARE PATIENT: ICD-10-CM

## 2022-01-01 PROCEDURE — 99283 EMERGENCY DEPT VISIT LOW MDM: CPT

## 2022-01-01 ASSESSMENT — ENCOUNTER SYMPTOMS
SORE THROAT: 0
COUGH: 0
HEADACHES: 0
NERVOUS/ANXIOUS: 0
DYSURIA: 0
MYALGIAS: 0
CHILLS: 0
HEMATURIA: 0
SHORTNESS OF BREATH: 0
NAUSEA: 0
VOMITING: 0
LIGHT-HEADEDNESS: 0
FEVER: 0
DIARRHEA: 1
WEAKNESS: 1
ABDOMINAL PAIN: 0

## 2022-01-23 NOTE — ED TRIAGE NOTES
"History of stomach cancer. Has been feeling weak for the last 3 days with decreased appetite. Last 3 hours has been worse. Reports anything he eats \"goes right through me\".  Complaining of abd pain. Is Hospice patient but wanted EMS to bring in. Type 2 diabetic.  Alert and oriented x3.  Has been vaccinated for covid but just lost wife to covid 2 weeks ago  "

## 2022-01-24 NOTE — ED NOTES
ED provider requested that one family member come back with patient to discuss Hospice/plan of care going forward. Patient states sister Jana to be called. Spoke with Jana and she will be here in 10 mins

## 2022-01-24 NOTE — ED PROVIDER NOTES
Emergency Department Encounter   NAME: Raf Dalton ; AGE: 71 year old male ; YOB: 1950 ; MRN: [id number] ; EVALUATION DATE & TIME: 1/23/2022  5:55 PM ; PCP: Barrett Arambula   ED PROVIDER: Opal Ocampo PA-C    Chief Complaint   Patient presents with     Generalized Weakness       Medical Decision Making & Final Diagnosis     1. Hospice care patient         ED Course as of 01/23/22 1953   Sun Jan 23, 2022   1951 Raf is a 71 year old male with a relevant PMH of pancreatic cancer on hospice, DMT2, HTN, COPD, AAA status postrepair 2019, JOSE, and HLD who presents to the ED for evaluation of weakness and dehydration.    1951 My exam is notable for tachycardia in a cachectic appearing man. no evidence of respiratory distress.   1952 I discussed with the patient at presentation what he would like me to do for him in the emergency department.  He requested I call his hospice team.  I called his hospice team and spoke to LETIICA Timmons.  We reviewed patient's presentation.  I discussed that I thought he was uncomfortable and scared at home alone.  He states he is dehydrated and weak.  I explained to the patient that those are normal processes at the end of someone's life.  We discussed options of work-up here and discharged from hospice care versus returning home.  I think some of patient's anxiety was that he did not have any family staying with him at home.  I discussed with patient Sister Jana at the bedside and patient options for care here versus discharge to hospice care at home.  I reviewed that hospice would be willing to send a nurse over tonight to help make sure he was comfortable.  Patient is in agreement to discharge home and wants no further care from the ED.        ED Course   6:02 PM I met and introduced myself to the patient. I gathered initial history and performed my physical exam. We discussed plan for initial workup.   I did see the patient while wearing full COVID-compliant  PPE.  6:12 PM Paged hospice  6:37 PM I spoke with Shanelle Steward RN from hospice about patient.  6:59 PM I rechecked patient and discussed options for care versus discharge home.  7:20 PM discussed with both patient and his Sister Jana at his request.  7:42 PM Discussed with Shanelle from hospice.    MEDICATIONS GIVEN IN THE EMERGENCY:   Medications - No data to display   NEW PRESCRIPTIONS STARTED AT TODAY'S ER VISIT:  New Prescriptions    No medications on file     =================================================================   History   Patient information was obtained from: patient   Use of Intrepreter: N/A   Raf Dalton is a 71 year old male with a relevant PMH of pancreatic cancer on hospice, DMT2, HTN, COPD, AAA status postrepair 2019, JOSE, and HLD who presents to the ED for evaluation of weakness and dehydration.   Patient reports he is living independently at home.  Hospice comes in a few times a week.  He reports feeling dehydrated and weaker than normal.  He has been taking his home medications.  He states he has family members in the lobby waiting to see him but no family members are staying with him.  His wife recently  from COVID-19 2 weeks ago.  He did not contact his hospice team prior to coming to the ED via EMS.  He reports mild abdominal pain which is his baseline and decreased appetite.  He denies fever, chills, sore throat, cough, chest pain, difficulty breathing, nausea, vomiting, diarrhea today (had 7 yesterday), urinary symptoms, numbness/tingling/swelling in arms or legs.  ______________________________________________________________________  Past Medical History:   Diagnosis Date     Abdominal aneurysm without mention of rupture      Cervicalgia      Chest pain 2019     Chronic airway obstruction, not elsewhere classified      Diabetes mellitus, type II (H)      High cholesterol      Hypertrophy of prostate with urinary obstruction and other lower urinary tract symptoms (LUTS)       Hypertrophy of prostate without urinary obstruction and other lower urinary tract symptoms (LUTS)      Hypoxemia      Obstructive sleep apnea (adult) (pediatric)      Other abnormal glucose      Other and unspecified hyperlipidemia      Shortness of breath      Sprain of unspecified site of sacroiliac region      Unspecified cataract      Unspecified essential hypertension      Unspecified vitamin D deficiency         Past Surgical History:   Procedure Laterality Date     ABDOMINAL AORTIC ANEURYSM REPAIR  2019     ENDOSCOPIC RETROGRADE CHOLANGIOPANCREATOGRAM N/A 2021    Procedure: ENDOSCOPIC RETROGRADE CHOLANGIOPANCREATOGRAPHY, BILIARY SPHINCTEROTOMY,;  Surgeon: Gabriel Garza MD;  Location: [facility] OR     ESOPHAGOSCOPY, GASTROSCOPY, DUODENOSCOPY (EGD), COMBINED N/A 2021    Procedure: ESOPHAGOGASTRODUODENOSCOPY, WITH ENDOSCOPIC ULTRASOUND, fine needle aspiration OF PANCREATIC HEA D MASS;  Surgeon: Gabriel Garza MD;  Location: [facility] OR     FINGER SURGERY         Family History   Problem Relation Age of Onset     Snoring Father        Social History     Tobacco Use     Smoking status: Current Some Day Smoker     Packs/day: 0.50     Years: 55.00     Pack years: 27.50     Types: Cigarettes     Last attempt to quit: 10/18/2017     Years since quittin.2     Smokeless tobacco: Never Used     Tobacco comment: .5PPD   Substance Use Topics     Alcohol use: Yes     Alcohol/week: 21.0 standard drinks     Comment: Alcoholic Drinks/day: 21 beers per week     Drug use: No       REVIEW OF SYSTEMS:    Review of Systems   Constitutional: Negative for chills and fever.   HENT: Negative for sore throat.    Eyes: Negative for visual disturbance.   Respiratory: Negative for cough and shortness of breath.    Cardiovascular: Negative for chest pain and leg swelling.   Gastrointestinal: Positive for diarrhea (yesterday). Negative for abdominal pain, nausea and vomiting.   Genitourinary:  Negative for dysuria and hematuria.   Musculoskeletal: Negative for myalgias.   Skin: Negative for rash.   Neurological: Positive for weakness. Negative for light-headedness and headaches.   Psychiatric/Behavioral: The patient is not nervous/anxious.    All other systems reviewed and are negative.        Physical Exam   BP (!) 125/95   Pulse 93   Temp 97  F (36.1  C) (Temporal)   Resp 21   Wt 71.4 kg (157 lb 8 oz)   SpO2 97%   BMI 23.60 kg/m      Physical Exam  Constitutional:       Comments: Cachectic appearance.  Mucous membranes dry. Breath smells ketotic.    HENT:      Head: Normocephalic.   Eyes:      Conjunctiva/sclera: Conjunctivae normal.   Cardiovascular:      Rate and Rhythm: Regular rhythm. Tachycardia present.      Heart sounds: Normal heart sounds.   Pulmonary:      Effort: Pulmonary effort is normal. No respiratory distress.   Abdominal:      General: Abdomen is flat.      Comments: Rib cage exposed.   Musculoskeletal:         General: No swelling or deformity. Normal range of motion.      Cervical back: Normal range of motion.      Right lower leg: No edema.      Left lower leg: No edema.   Skin:     General: Skin is warm.   Neurological:      General: No focal deficit present.      Mental Status: He is alert.   Psychiatric:         Mood and Affect: Mood normal.         Lab Work (Reviewed and Interpreted):   Labs Ordered and Resulted from Time of ED Arrival to Time of ED Departure - No data to display    Imaging (Reviewed and Interpreted):   No orders to display       Opal Ocampo PA-C   Emergency Medicine   [facility] EMERGENCY ROOM  [address] [id number]  [phone]  Dept: [phone]        Opal Ocampo PA-C  01/23/22 1953

## 2022-01-24 NOTE — DISCHARGE INSTRUCTIONS
Here in the emergency department from home because you felt dehydrated.  You are already enrolled in a hospice program.  We discussed and you stated you would like to return home.  Your hospice team said they would send over a nurse to help tonight.

## 2024-09-16 NOTE — ANESTHESIA CARE TRANSFER NOTE
Last vitals:   Vitals:    04/09/19 1324   BP: 172/82   Pulse: 72   Resp:    Temp: 36.9  C (98.4  F)   SpO2: 95%     Patient's level of consciousness is drowsy  Spontaneous respirations: yes  Maintains airway independently: yes  Dentition unchanged: yes  Oropharynx: oropharynx clear of all foreign objects    QCDR Measures:  ASA# 20 - Surgical Safety Checklist: WHO surgical safety checklist completed prior to induction    PQRS# 430 - Adult PONV Prevention: NA - Not adult patient, not GA or 3 or more risk factors NOT present  ASA# 8 - Peds PONV Prevention: NA - Not pediatric patient, not GA or 2 or more risk factors NOT present  PQRS# 424 - Claribel-op Temp Management: 4559F - At least one body temp DOCUMENTED => 35.5C or 95.9F within required timeframe  PQRS# 426 - PACU Transfer Protocol: - Transfer of care checklist used  ASA# 14 - Acute Post-op Pain: ASA14B - Patient did NOT experience pain >= 7 out of 10   absent sensation mild impairment

## 2025-01-17 NOTE — PATIENT INSTRUCTIONS
Recommendations:  - I have placed a referral to [facility]. You should hear from them within the next couple days.  - We completed a POLST form today, which is a form stating DNR as well as preference to not go to the hospital if it is avoidable.  - We will ask a  to check in with you about additional in home resources.    Follow up: About a month, however, can cancel if Hospice enrollment occurs.      Reasons to Call    If you are having worsening/uncontrolled symptoms we want you to call!    You or your other physicians make any changes to medications we have prescribed.  -Please call for refills 4-5 days before you will run out of medication.    Important Phone Numbers    [facility] - Marisa Wilcox. Palliative Care RN - [phone]    [facility] - Kourtney Mg. Palliative Care RN - [phone]  *For Refills, scheduling, and general questions - [phone]  *After hours or on weekends. Will connect you with on call MD. [phone]      
no

## (undated) DEVICE — AUTOTOME 44 20MM SHORT WIRE SYSTEM M00545170

## (undated) DEVICE — ENDO NDL ASPIRATION 22GA SLIMLINE EXPECT EUS M00555510

## (undated) DEVICE — SOL WATER IRRIG 1000ML BOTTLE 2F7114

## (undated) DEVICE — TUBING SUCTION MEDI-VAC 1/4"X20' N620A - HE

## (undated) DEVICE — WIRE GUIDE 0.035"X260CM NAVIPRO ANG 5625 M00556251

## (undated) DEVICE — SUCTION MANIFOLD NEPTUNE 2 SYS 1 PORT 702-025-000

## (undated) DEVICE — WIRE GUIDE 0.035"X260CM DREAMWIRE M00556100

## (undated) DEVICE — PLATE GROUNDING ADULT W/CORD 9165L